# Patient Record
Sex: FEMALE | Race: WHITE | Employment: OTHER | URBAN - METROPOLITAN AREA
[De-identification: names, ages, dates, MRNs, and addresses within clinical notes are randomized per-mention and may not be internally consistent; named-entity substitution may affect disease eponyms.]

---

## 2017-02-15 RX ORDER — LEVOTHYROXINE SODIUM 0.05 MG/1
TABLET ORAL
Qty: 90 TABLET | Refills: 3 | Status: SHIPPED | OUTPATIENT
Start: 2017-02-15 | End: 2017-12-29 | Stop reason: SDUPTHER

## 2017-03-09 ENCOUNTER — OFFICE VISIT (OUTPATIENT)
Dept: INTERNAL MEDICINE CLINIC | Age: 72
End: 2017-03-09

## 2017-03-09 VITALS
OXYGEN SATURATION: 98 % | WEIGHT: 179 LBS | HEIGHT: 68 IN | BODY MASS INDEX: 27.13 KG/M2 | DIASTOLIC BLOOD PRESSURE: 74 MMHG | SYSTOLIC BLOOD PRESSURE: 118 MMHG | HEART RATE: 67 BPM

## 2017-03-09 DIAGNOSIS — F41.9 ANXIETY: ICD-10-CM

## 2017-03-09 DIAGNOSIS — E78.5 ELEVATED LIPIDS: Primary | ICD-10-CM

## 2017-03-09 DIAGNOSIS — F32.A DEPRESSION, UNSPECIFIED DEPRESSION TYPE: ICD-10-CM

## 2017-03-09 DIAGNOSIS — E03.9 HYPOTHYROIDISM, UNSPECIFIED TYPE: ICD-10-CM

## 2017-03-09 PROCEDURE — 99213 OFFICE O/P EST LOW 20 MIN: CPT | Performed by: INTERNAL MEDICINE

## 2017-03-24 RX ORDER — SIMVASTATIN 40 MG
TABLET ORAL
Qty: 90 TABLET | Refills: 3 | Status: SHIPPED | OUTPATIENT
Start: 2017-03-24 | End: 2018-02-06 | Stop reason: SDUPTHER

## 2017-09-14 ENCOUNTER — OFFICE VISIT (OUTPATIENT)
Dept: INTERNAL MEDICINE CLINIC | Age: 72
End: 2017-09-14

## 2017-09-14 VITALS
OXYGEN SATURATION: 98 % | HEIGHT: 68 IN | DIASTOLIC BLOOD PRESSURE: 70 MMHG | HEART RATE: 71 BPM | WEIGHT: 175 LBS | BODY MASS INDEX: 26.52 KG/M2 | SYSTOLIC BLOOD PRESSURE: 130 MMHG

## 2017-09-14 DIAGNOSIS — F32.A DEPRESSION, UNSPECIFIED DEPRESSION TYPE: ICD-10-CM

## 2017-09-14 DIAGNOSIS — E03.9 HYPOTHYROIDISM, UNSPECIFIED TYPE: Primary | ICD-10-CM

## 2017-09-14 DIAGNOSIS — F41.9 ANXIETY: ICD-10-CM

## 2017-09-14 DIAGNOSIS — E78.5 ELEVATED LIPIDS: ICD-10-CM

## 2017-09-14 PROCEDURE — 99213 OFFICE O/P EST LOW 20 MIN: CPT | Performed by: INTERNAL MEDICINE

## 2017-09-14 RX ORDER — SERTRALINE HYDROCHLORIDE 100 MG/1
TABLET, FILM COATED ORAL
Qty: 90 TABLET | Refills: 3 | Status: SHIPPED | OUTPATIENT
Start: 2017-09-14 | End: 2018-06-11

## 2017-09-14 ASSESSMENT — PATIENT HEALTH QUESTIONNAIRE - PHQ9
1. LITTLE INTEREST OR PLEASURE IN DOING THINGS: 0
SUM OF ALL RESPONSES TO PHQ QUESTIONS 1-9: 0
SUM OF ALL RESPONSES TO PHQ9 QUESTIONS 1 & 2: 0
2. FEELING DOWN, DEPRESSED OR HOPELESS: 0

## 2017-10-03 ENCOUNTER — TELEPHONE (OUTPATIENT)
Dept: INTERNAL MEDICINE CLINIC | Age: 72
End: 2017-10-03

## 2017-10-03 DIAGNOSIS — Z12.11 SPECIAL SCREENING FOR MALIGNANT NEOPLASMS, COLON: Primary | ICD-10-CM

## 2017-10-03 NOTE — TELEPHONE ENCOUNTER
Patient calling for order and Prior authorization for Colonoscopy. Scheduled for November 7 , 2017 , Dr. Smiley Trinidad.

## 2017-11-07 ENCOUNTER — HOSPITAL ENCOUNTER (OUTPATIENT)
Dept: SURGERY | Age: 72
Discharge: HOME | End: 2017-11-07
Attending: INTERNAL MEDICINE | Admitting: INTERNAL MEDICINE

## 2017-11-07 VITALS
HEIGHT: 68 IN | WEIGHT: 170 LBS | DIASTOLIC BLOOD PRESSURE: 79 MMHG | OXYGEN SATURATION: 97 % | BODY MASS INDEX: 25.76 KG/M2 | TEMPERATURE: 97.9 F | SYSTOLIC BLOOD PRESSURE: 106 MMHG | HEART RATE: 75 BPM | RESPIRATION RATE: 18 BRPM

## 2017-11-07 DIAGNOSIS — Z12.11 ENCOUNTER FOR SCREENING FOR MALIGNANT NEOPLASM OF COLON: ICD-10-CM

## 2017-11-07 RX ORDER — SODIUM CHLORIDE, SODIUM LACTATE, POTASSIUM CHLORIDE, CALCIUM CHLORIDE 600; 310; 30; 20 MG/100ML; MG/100ML; MG/100ML; MG/100ML
INJECTION, SOLUTION INTRAVENOUS ONCE
Status: COMPLETED | OUTPATIENT
Start: 2017-11-07 | End: 2017-11-07

## 2017-11-07 RX ORDER — LIDOCAINE HYDROCHLORIDE 10 MG/ML
0.1 INJECTION, SOLUTION EPIDURAL; INFILTRATION; INTRACAUDAL; PERINEURAL
Status: ACTIVE | OUTPATIENT
Start: 2017-11-07 | End: 2017-11-07

## 2017-11-07 RX ORDER — COVID-19 ANTIGEN TEST
KIT MISCELLANEOUS
COMMUNITY
End: 2018-08-09

## 2017-11-07 RX ADMIN — SODIUM CHLORIDE, SODIUM LACTATE, POTASSIUM CHLORIDE, CALCIUM CHLORIDE: 600; 310; 30; 20 INJECTION, SOLUTION INTRAVENOUS at 08:25

## 2017-11-07 ASSESSMENT — PAIN SCALES - GENERAL
PAINLEVEL_OUTOF10: 0

## 2017-11-07 ASSESSMENT — PAIN - FUNCTIONAL ASSESSMENT: PAIN_FUNCTIONAL_ASSESSMENT: 0-10

## 2017-11-07 NOTE — PROGRESS NOTES
Discharge instructions reviewed with patient/responsible adult and understanding verbalized. Discharge instructions signed and copies given.  Awaiting to talk to Doctor

## 2017-11-07 NOTE — BRIEF OP NOTE
Post-Sedation Assessment  Swetha Guzman   11/7/2017  A pre-sedation re-evaluation was performed immediately prior to beginning the procedure.   Procedure: CLS  Preprocedure Dx: h/o polyps  Postprocedure Dx: polyps  Medications: Procedural sedation with Fentanyl, Versed  Complications: None  Estimated Blood Loss: <10cc  Specimens: were obtained    Jose Elizabeth

## 2017-11-07 NOTE — PROCEDURES
[facility]                              COLONOSCOPY REPORT    PATIENT NAME: Aimee Potter                     :         1945  MED REC NO:   [id number]                          ROOM:  ACCOUNT NO:   [de-identified]                          ADMIT DATE:  2017  PROVIDER:     Luz Orlando MD    DATE OF PROCEDURE:  2017    REFERRING PROVIDER:  Neville Granados MD    PATIENT HISTORY:  The patient is a 77-year-old female who presents for  surveillance colonoscopy. In , three adenomatous polyps were  removed. There is no family history of colon polyps, colon cancer. MONITORING:  Oxygen saturation, blood pressure, and heart rate were  monitored continuously. MEDICATIONS OF PROCEDURE:  Include fentanyl 100 mcg and Versed 6 mg. DESCRIPTION OF PROCEDURE:  Informed consent was obtained. The  patient's past medical history, medication list and allergies were  reviewed. Risks, benefits, alternatives have been discussed. Specific risks discussed include those of bleeding, perforation,  splenic injury, aspiration pneumonia, side effects to sedative  medication, possibility of missed lesion. The patient was then placed in the left lateral decubitus position. IV sedation was started in sequential fashion until the appropriate  level of consciousness was achieved. Digital rectal examination was  unremarkable. Colonoscope was then advanced from the anus to the  cecum. The appendiceal orifice and ileocecal valve were visualized. Cecum was photographed. Colonoscope was then slowly withdrawn. Each  colonic segment was evaluated carefully. Care was taken to inspect  the proximal aspects of the IC valve, haustral folds and flexures. At  the level of hepatic flexure, diminutive polyp was removed with cold  biopsy forceps.   In the distal sigmoid, a diminutive polyp was removed  with the cold biopsy forceps. Retroflex view of the rectum was  unremarkable. Photographs obtained. Colonoscope was then withdrawn  from the patient. The procedure was terminated, well tolerated, no  immediate complications. Prep was adequate. Colonoscope withdrawal  time exceeded 6 minutes. This was demonstrated through photographs. POSTPROCEDURE DIAGNOSIS:  Two polyps. If either polyp is an adenoma, I recommend once again a 3-year  followup. If both are hyperplastic, 5 years should suffice.         Lisa Hinojosa MD    D: 11/07/2017 9:32:05       T: 11/07/2017 9:34:49     [email or web address]  Job#: [id number]     Doc#: [id number]    CC:  Cassia Kwong MD

## 2017-12-29 RX ORDER — LEVOTHYROXINE SODIUM 0.05 MG/1
TABLET ORAL
Qty: 90 TABLET | Refills: 3 | Status: SHIPPED | OUTPATIENT
Start: 2017-12-29 | End: 2018-12-03 | Stop reason: SDUPTHER

## 2017-12-29 NOTE — TELEPHONE ENCOUNTER
Refill request for levothyroxine medication.      Name of [address] [place] [address]      Last visit - 9/14/17     Pending visit - 3/16/18    Last refill -10/29/17      Medication Contract signed -   Last Oarrs ran-         Additional Comments

## 2018-02-06 RX ORDER — SIMVASTATIN 40 MG
TABLET ORAL
Qty: 90 TABLET | Refills: 3 | Status: SHIPPED | OUTPATIENT
Start: 2018-02-06 | End: 2019-03-13 | Stop reason: SDUPTHER

## 2018-03-28 ENCOUNTER — OFFICE VISIT (OUTPATIENT)
Dept: INTERNAL MEDICINE CLINIC | Age: 73
End: 2018-03-28

## 2018-03-28 VITALS
DIASTOLIC BLOOD PRESSURE: 70 MMHG | SYSTOLIC BLOOD PRESSURE: 120 MMHG | WEIGHT: 170 LBS | HEART RATE: 70 BPM | OXYGEN SATURATION: 99 % | BODY MASS INDEX: 26.23 KG/M2

## 2018-03-28 DIAGNOSIS — M79.672 PAIN OF LEFT HEEL: ICD-10-CM

## 2018-03-28 DIAGNOSIS — E03.9 HYPOTHYROIDISM, UNSPECIFIED TYPE: ICD-10-CM

## 2018-03-28 DIAGNOSIS — E78.5 ELEVATED LIPIDS: Primary | ICD-10-CM

## 2018-03-28 DIAGNOSIS — F41.9 ANXIETY: ICD-10-CM

## 2018-03-28 DIAGNOSIS — F32.A DEPRESSION, UNSPECIFIED DEPRESSION TYPE: ICD-10-CM

## 2018-03-28 PROCEDURE — G8400 PT W/DXA NO RESULTS DOC: HCPCS | Performed by: INTERNAL MEDICINE

## 2018-03-28 PROCEDURE — 1123F ACP DISCUSS/DSCN MKR DOCD: CPT | Performed by: INTERNAL MEDICINE

## 2018-03-28 PROCEDURE — 99213 OFFICE O/P EST LOW 20 MIN: CPT | Performed by: INTERNAL MEDICINE

## 2018-03-28 PROCEDURE — G8419 CALC BMI OUT NRM PARAM NOF/U: HCPCS | Performed by: INTERNAL MEDICINE

## 2018-03-28 PROCEDURE — 3017F COLORECTAL CA SCREEN DOC REV: CPT | Performed by: INTERNAL MEDICINE

## 2018-03-28 PROCEDURE — 3014F SCREEN MAMMO DOC REV: CPT | Performed by: INTERNAL MEDICINE

## 2018-03-28 PROCEDURE — G8427 DOCREV CUR MEDS BY ELIG CLIN: HCPCS | Performed by: INTERNAL MEDICINE

## 2018-03-28 PROCEDURE — 1090F PRES/ABSN URINE INCON ASSESS: CPT | Performed by: INTERNAL MEDICINE

## 2018-03-28 PROCEDURE — 1036F TOBACCO NON-USER: CPT | Performed by: INTERNAL MEDICINE

## 2018-03-28 PROCEDURE — 4040F PNEUMOC VAC/ADMIN/RCVD: CPT | Performed by: INTERNAL MEDICINE

## 2018-03-28 PROCEDURE — G8484 FLU IMMUNIZE NO ADMIN: HCPCS | Performed by: INTERNAL MEDICINE

## 2018-03-28 NOTE — PROGRESS NOTES
Subjective:      Patient ID: General Franklin is a 81 y.o. female. Cc: Lipids  HPI: Patient with hyperlipidemia, hypothyroid, anxiety, depression. Patient last seen in the office 9/14/2017 and states that a week later her mother passed away who was in hospice and living at her house. Patient states she stopped Zoloft as she frequently skips it and cannot tell a difference if she really needed it or not. At this time states she is doing okay concerning anxiety/depression. 11/7/2017: Colonoscopy: DX: 2 hyperplastic polyps. Dr. Za Saini. Redo 5 years. Patient complains of left heel pain. Frequently goes barefoot. Denies definite trauma history. Medicines and allergies reviewed  Health maintenance reviewed  Social history reviewed  Family history reviewed  Reviewed laboratory data 3/20/2018: Chemistry panel: Unremarkable. Lipid panel: Total cholesterol: 181. LDL cholesterol: 82.    Review of Systems    Review of Systems   Constitutional: negative   HENT: negative   EYES: negative   Respiratory: negative   Gastrointestinal: negative   Endocrine: negative   Musculoskeletal:  Left heel pain. Skin: negative   Allergic/Immunological: negative   Hematological: negative   Psychiatric/Behavorial: negative   CV: negative   CNS: negative   :Negative   S/E:Negative  Renal: Negative      Objective:   Physical Exam : Lungs: Clear to auscultation. CV: S1-S2 normal.  SANDOVAL. Carotid: No bruit. Carotid upstroke. Head/neck: Neck: No lymphadenopathy. Thyroid: Nonpalpable and nontender to touch. Spine/extremities: Ankle edema bilaterally. Skin: No rash. Blood pressure 120/70, pulse 70, weight 170 lb (77.1 kg), SpO2 99 %. Assessment:       1. Lipids: Stable     2. Left heel pain  3. Hypothyroid  4. Anxiety: Stable off Zoloft  5. Depression: Stable off Zoloft      Plan:      1. Referral to podiatry for left heel pain  2. Continue present medicines  3.   Gave slip to obtain fasting laboratory studies before next office visit  Return follow-up  Dr. Rober London

## 2018-04-30 ENCOUNTER — OFFICE VISIT (OUTPATIENT)
Dept: INTERNAL MEDICINE CLINIC | Age: 73
End: 2018-04-30

## 2018-04-30 VITALS
WEIGHT: 169 LBS | BODY MASS INDEX: 26.08 KG/M2 | DIASTOLIC BLOOD PRESSURE: 78 MMHG | OXYGEN SATURATION: 98 % | HEART RATE: 81 BPM | SYSTOLIC BLOOD PRESSURE: 138 MMHG

## 2018-04-30 DIAGNOSIS — F41.9 ANXIETY: ICD-10-CM

## 2018-04-30 DIAGNOSIS — E78.5 ELEVATED LIPIDS: ICD-10-CM

## 2018-04-30 DIAGNOSIS — F32.A DEPRESSION, UNSPECIFIED DEPRESSION TYPE: ICD-10-CM

## 2018-04-30 DIAGNOSIS — E03.9 HYPOTHYROIDISM, UNSPECIFIED TYPE: ICD-10-CM

## 2018-04-30 DIAGNOSIS — G51.0 BELL'S PALSY: Primary | ICD-10-CM

## 2018-04-30 PROCEDURE — 1036F TOBACCO NON-USER: CPT | Performed by: INTERNAL MEDICINE

## 2018-04-30 PROCEDURE — 99214 OFFICE O/P EST MOD 30 MIN: CPT | Performed by: INTERNAL MEDICINE

## 2018-04-30 PROCEDURE — 1090F PRES/ABSN URINE INCON ASSESS: CPT | Performed by: INTERNAL MEDICINE

## 2018-04-30 PROCEDURE — G8419 CALC BMI OUT NRM PARAM NOF/U: HCPCS | Performed by: INTERNAL MEDICINE

## 2018-04-30 PROCEDURE — G8400 PT W/DXA NO RESULTS DOC: HCPCS | Performed by: INTERNAL MEDICINE

## 2018-04-30 PROCEDURE — 3017F COLORECTAL CA SCREEN DOC REV: CPT | Performed by: INTERNAL MEDICINE

## 2018-04-30 PROCEDURE — 4040F PNEUMOC VAC/ADMIN/RCVD: CPT | Performed by: INTERNAL MEDICINE

## 2018-04-30 PROCEDURE — 1123F ACP DISCUSS/DSCN MKR DOCD: CPT | Performed by: INTERNAL MEDICINE

## 2018-04-30 PROCEDURE — G8427 DOCREV CUR MEDS BY ELIG CLIN: HCPCS | Performed by: INTERNAL MEDICINE

## 2018-04-30 RX ORDER — ACYCLOVIR 400 MG/1
400 TABLET ORAL
Qty: 50 TABLET | Refills: 0 | Status: SHIPPED | OUTPATIENT
Start: 2018-04-30 | End: 2018-05-10

## 2018-04-30 RX ORDER — PREDNISONE 20 MG/1
TABLET ORAL
Qty: 28 TABLET | Refills: 0 | Status: SHIPPED | OUTPATIENT
Start: 2018-04-30 | End: 2018-06-11 | Stop reason: ALTCHOICE

## 2018-05-08 ENCOUNTER — TELEPHONE (OUTPATIENT)
Dept: INTERNAL MEDICINE CLINIC | Age: 73
End: 2018-05-08

## 2018-05-14 ENCOUNTER — TELEPHONE (OUTPATIENT)
Dept: INTERNAL MEDICINE CLINIC | Age: 73
End: 2018-05-14

## 2018-06-11 ENCOUNTER — OFFICE VISIT (OUTPATIENT)
Dept: INTERNAL MEDICINE CLINIC | Age: 73
End: 2018-06-11

## 2018-06-11 VITALS
BODY MASS INDEX: 25.62 KG/M2 | SYSTOLIC BLOOD PRESSURE: 110 MMHG | WEIGHT: 166 LBS | HEART RATE: 69 BPM | OXYGEN SATURATION: 98 % | DIASTOLIC BLOOD PRESSURE: 66 MMHG

## 2018-06-11 DIAGNOSIS — G51.0 BELL'S PALSY: ICD-10-CM

## 2018-06-11 PROCEDURE — G8400 PT W/DXA NO RESULTS DOC: HCPCS | Performed by: INTERNAL MEDICINE

## 2018-06-11 PROCEDURE — 99214 OFFICE O/P EST MOD 30 MIN: CPT | Performed by: INTERNAL MEDICINE

## 2018-06-11 PROCEDURE — 1090F PRES/ABSN URINE INCON ASSESS: CPT | Performed by: INTERNAL MEDICINE

## 2018-06-11 PROCEDURE — 1036F TOBACCO NON-USER: CPT | Performed by: INTERNAL MEDICINE

## 2018-06-11 PROCEDURE — 1123F ACP DISCUSS/DSCN MKR DOCD: CPT | Performed by: INTERNAL MEDICINE

## 2018-06-11 PROCEDURE — 3017F COLORECTAL CA SCREEN DOC REV: CPT | Performed by: INTERNAL MEDICINE

## 2018-06-11 PROCEDURE — G8419 CALC BMI OUT NRM PARAM NOF/U: HCPCS | Performed by: INTERNAL MEDICINE

## 2018-06-11 PROCEDURE — G8427 DOCREV CUR MEDS BY ELIG CLIN: HCPCS | Performed by: INTERNAL MEDICINE

## 2018-06-11 PROCEDURE — 4040F PNEUMOC VAC/ADMIN/RCVD: CPT | Performed by: INTERNAL MEDICINE

## 2018-07-26 ENCOUNTER — OFFICE VISIT (OUTPATIENT)
Dept: INTERNAL MEDICINE CLINIC | Age: 73
End: 2018-07-26

## 2018-07-26 VITALS
HEART RATE: 68 BPM | WEIGHT: 161 LBS | BODY MASS INDEX: 24.84 KG/M2 | SYSTOLIC BLOOD PRESSURE: 124 MMHG | OXYGEN SATURATION: 97 % | DIASTOLIC BLOOD PRESSURE: 68 MMHG

## 2018-07-26 DIAGNOSIS — G51.0 BELL'S PALSY: Primary | ICD-10-CM

## 2018-07-26 DIAGNOSIS — E78.5 ELEVATED LIPIDS: ICD-10-CM

## 2018-07-26 DIAGNOSIS — K58.9 IRRITABLE BOWEL SYNDROME, UNSPECIFIED TYPE: ICD-10-CM

## 2018-07-26 PROCEDURE — G8427 DOCREV CUR MEDS BY ELIG CLIN: HCPCS | Performed by: INTERNAL MEDICINE

## 2018-07-26 PROCEDURE — 99214 OFFICE O/P EST MOD 30 MIN: CPT | Performed by: INTERNAL MEDICINE

## 2018-07-26 PROCEDURE — G8400 PT W/DXA NO RESULTS DOC: HCPCS | Performed by: INTERNAL MEDICINE

## 2018-07-26 PROCEDURE — G8420 CALC BMI NORM PARAMETERS: HCPCS | Performed by: INTERNAL MEDICINE

## 2018-07-26 PROCEDURE — 1036F TOBACCO NON-USER: CPT | Performed by: INTERNAL MEDICINE

## 2018-07-26 PROCEDURE — 4040F PNEUMOC VAC/ADMIN/RCVD: CPT | Performed by: INTERNAL MEDICINE

## 2018-07-26 PROCEDURE — 3017F COLORECTAL CA SCREEN DOC REV: CPT | Performed by: INTERNAL MEDICINE

## 2018-07-26 PROCEDURE — 1123F ACP DISCUSS/DSCN MKR DOCD: CPT | Performed by: INTERNAL MEDICINE

## 2018-07-26 PROCEDURE — 1090F PRES/ABSN URINE INCON ASSESS: CPT | Performed by: INTERNAL MEDICINE

## 2018-07-26 PROCEDURE — 1101F PT FALLS ASSESS-DOCD LE1/YR: CPT | Performed by: INTERNAL MEDICINE

## 2018-07-26 NOTE — PROGRESS NOTES
orientation. Good historian. Blood pressure 124/68, pulse 68, weight 161 lb (73 kg), SpO2 97 %. Assessment:      1. Bell's palsy improvement has plateaued at about 95%. 2.  Right eye irritation and occasional blurriness  3. Hypothyroid  4. Anxiety: Stable  5. Depression: Stable         Plan:      1. Continue to use Lacri-Lube ointment at nighttime but needs to increase eye wetting solution during the day  2. Referral to ophthalmology  3. Referral to neurology  4. Keep upcoming appointment in October  5.   Obtain fasting laboratory studies before next office visit  Return follow-up  Dr. Melissa Maria

## 2018-08-09 ENCOUNTER — INITIAL CONSULT (OUTPATIENT)
Dept: NEUROLOGY | Age: 73
End: 2018-08-09

## 2018-08-09 ENCOUNTER — HOSPITAL ENCOUNTER (OUTPATIENT)
Age: 73
Discharge: HOME | End: 2018-08-09
Payer: MEDICARE

## 2018-08-09 VITALS
SYSTOLIC BLOOD PRESSURE: 113 MMHG | BODY MASS INDEX: 25.58 KG/M2 | HEIGHT: 67 IN | OXYGEN SATURATION: 97 % | DIASTOLIC BLOOD PRESSURE: 63 MMHG | HEART RATE: 66 BPM | WEIGHT: 163 LBS

## 2018-08-09 DIAGNOSIS — E78.5 DYSLIPIDEMIA: ICD-10-CM

## 2018-08-09 DIAGNOSIS — R29.810 FACIAL WEAKNESS: ICD-10-CM

## 2018-08-09 DIAGNOSIS — G51.0 BELL'S PALSY: Primary | ICD-10-CM

## 2018-08-09 DIAGNOSIS — E03.9 ACQUIRED HYPOTHYROIDISM: ICD-10-CM

## 2018-08-09 LAB
FOLATE: 14.97 NG/ML (ref 4.78–24.2)
VITAMIN B-12: 443 PG/ML (ref 211–911)

## 2018-08-09 PROCEDURE — 82607 VITAMIN B-12: CPT

## 2018-08-09 PROCEDURE — 3017F COLORECTAL CA SCREEN DOC REV: CPT | Performed by: PSYCHIATRY & NEUROLOGY

## 2018-08-09 PROCEDURE — 1101F PT FALLS ASSESS-DOCD LE1/YR: CPT | Performed by: PSYCHIATRY & NEUROLOGY

## 2018-08-09 PROCEDURE — 99203 OFFICE O/P NEW LOW 30 MIN: CPT | Performed by: PSYCHIATRY & NEUROLOGY

## 2018-08-09 PROCEDURE — G8427 DOCREV CUR MEDS BY ELIG CLIN: HCPCS | Performed by: PSYCHIATRY & NEUROLOGY

## 2018-08-09 PROCEDURE — G8400 PT W/DXA NO RESULTS DOC: HCPCS | Performed by: PSYCHIATRY & NEUROLOGY

## 2018-08-09 PROCEDURE — 1123F ACP DISCUSS/DSCN MKR DOCD: CPT | Performed by: PSYCHIATRY & NEUROLOGY

## 2018-08-09 PROCEDURE — G8419 CALC BMI OUT NRM PARAM NOF/U: HCPCS | Performed by: PSYCHIATRY & NEUROLOGY

## 2018-08-09 PROCEDURE — 1090F PRES/ABSN URINE INCON ASSESS: CPT | Performed by: PSYCHIATRY & NEUROLOGY

## 2018-08-09 PROCEDURE — 4040F PNEUMOC VAC/ADMIN/RCVD: CPT | Performed by: PSYCHIATRY & NEUROLOGY

## 2018-08-09 PROCEDURE — 82746 ASSAY OF FOLIC ACID SERUM: CPT

## 2018-08-09 PROCEDURE — 36415 COLL VENOUS BLD VENIPUNCTURE: CPT

## 2018-08-09 PROCEDURE — 1036F TOBACCO NON-USER: CPT | Performed by: PSYCHIATRY & NEUROLOGY

## 2018-08-09 NOTE — PROGRESS NOTES
The patient is a 81y.o. years old female who  was referred by Supriya Andrade MD  for consultation regarding bells palsy     HPI:  The patient was diagnosed with right Bell's palsy at the end of April. She woke up that day with acute right facial weakness. She was treated with acyclovir and medrol dose pack. Sx improved gradually. She came today for consultation regarding worsening of her vision. She is describing intermittent blurred vision of her right eye for the last for 5 weeks. Symptoms are waxing and waning but daily. Degree is moderate. No double vision, headache, numbness or tingling or weakness. She has occasional asymmetry in her facial smile with difficulty drinking and drooling. She is using eye patch and artificial tears daily. She thinks she has improved at least 75-80%. The patient was concerned about possible central cause for her Bell's palsy. She denies recent rash, tick bite or travel. She denies any weakness in arms or legs or chest pain. No other triggers or other associated symptoms. History of hypothyroid on Synthroid. She is on Zocor for hyperlipidemia. She denies smoking or history of diabetes. No other new symptoms today. Other review of system was unremarkable. Past Medical History:   Diagnosis Date    Anemia     Anxiety     Asthma '96    PFT's    Back strain     Bell's palsy 04/2018    Rt Side    Chest pain 08/05/2016    GXT: Normal.    Chronic back pain     Cystitis     Depression     Dermatitis     Fibroids     GERD (gastroesophageal reflux disease)     Hematuria '00    Cystoscopy & IVP  (---)    History of recurrent UTIs     Hyperlipidemia     Hypothyroidism 06/05    Insomnia     Irritable bowel syndrome     Palpitations     Pneumonia 11/93     Prior to Visit Medications    Medication Sig Taking?  Authorizing Provider   simvastatin (ZOCOR) 40 MG tablet TAKE ONE TABLET EVERY EVENING FOR HIGH CHOLESTEROL Yes Margaret Hutchinson, CASI - CNP levothyroxine (SYNTHROID) 50 MCG tablet TAKE 1 TABLET EVERY DAY (THYROID MEDICINE) Yes Nolvia Linda MD     Allergies   Allergen Reactions    Macrobid [Nitrofurantoin Monohyd Macro] Rash    Sulfa Antibiotics Nausea Only and Other (See Comments)     Stomach nausea     Social History   Substance Use Topics    Smoking status: Former Smoker     Quit date: 1/1/1972    Smokeless tobacco: Never Used    Alcohol use Yes      Comment: 2 a month     Family History   Problem Relation Age of Onset    Heart Disease Mother     High Blood Pressure Mother     High Cholesterol Mother     Substance Abuse Mother         Tob. use    Cancer Mother         basal cell    Emphysema Mother     Heart Failure Mother     Hypertension Mother     Diabetes Father     Alcohol Abuse Father     Arrhythmia Father     Hypertension Father     Cancer Father         bladder    Asthma Sister     Cancer Maternal Aunt     Heart Failure Maternal Grandmother     Emphysema Maternal Grandfather      Past Surgical History:   Procedure Laterality Date    CHOLECYSTECTOMY      COLONOSCOPY  01/04    Neg.  COLONOSCOPY  10/1/14    Tubular Adenoma    COLONOSCOPY  11/07/2017     Hyperplastic Polyps ×2: Redo 5 years    CYSTOSCOPY      HYSTERECTOMY, TOTAL ABDOMINAL      TONSILLECTOMY AND ADENOIDECTOMY      TUBAL LIGATION         ROS : A 10-12 system review of constitutional, cardiovascular, respiratory, musculoskeletal, endocrine, skin, SHEENT, genitourinary, psychiatric and neurologic systems was obtained and updated today and is unremarkable except as mentioned in my HPI        Exam:   Constitutional:   Vitals:    08/09/18 1049   BP: 113/63   Pulse: 66   SpO2: 97%   Weight: 163 lb (73.9 kg)   Height: 5' 7\" (1.702 m)       General appearance: well-nourished. Eye: No icterus. No blurring of optic disc. Neck: supple  Cardiovascular: No carotid bruit. No lower leg edema with good pulsation.    Mental Status: Oriented to person, place, problem, and time. Fluent speech. Good fund of knowledge. Normal attention span and concentration. Cranial Nerves:   II: Visual fields: Full to confrontation  III: Pupils: equal, round, reactive to light  III,IV,VI: Extra Ocular Movements are intact. No nystagmus  V: Facial sensation is intact to pin prick and light touch  VII: Facial strength and movements: Asymmetric smile with right facial asymmetry, and frequent eye blinking on the right side with compensation her left eye, difficulties with buccinator and eye closure on the right compared to the left. VIII: Hearing: Intact to finger rub bilaterally  IX: Palate elevation is symmetric  XI: Shoulder shrug is intact  XII: Tongue movements are normal  Musculoskeletal: 5/5 in all 4 extremities. Normal tone. Reflexes: Bilateral biceps 2/4, triceps 2/4, brachial radialis 2/4, knee 2/4 and ankle 2/4. Planters: flexor bilaterally. Coordination: no pronator drift, no dysmetria. Finger nose finger testing within normal limits. Sensation: normal to all modalities. Gait/Posture: steady      Medical decision making:  I personally reviewed and updated social history, past medical history, medications, allergy, surgical history, and family history as documented in the patient's electronic health records. Labs and/or neuroimaging and other test results reviewed and discussed with the patient. Reviewed notes from other physicians. Provided patient education regarding risk, benefits and treatment options as well as adherence to medication regimen and side effect from these medications. Diagnosis Orders   1. Bell's palsy  MRI Brain WO Contrast    Vitamin B12 & Folate   2. Facial weakness  MRI Brain WO Contrast   3. Dyslipidemia     4. Acquired hypothyroidism         Assessment:  Right Bell's palsy. Long discussion with the patient regading outcome from Bell's palsy, differential diagnosis and possible treatment options.   So far her exam is consistent with

## 2018-08-09 NOTE — LETTER
Drake Krause MD    [facility] Neurology  [address] [facility], [address] [place] [address]. [address] [place] [address]    [phone] (Phone)  [phone] (Fax)    Dear Dr Cash Kenny MD    I had the pleasure of seeing your patient Ceci Fitzgerald  1945 today. I have attached a detailed summary below:    The patient is a 81y.o. years old female who  was referred by Cash Kenny MD  for consultation regarding bells palsy     HPI:  The patient was diagnosed with right Bell's palsy at the end of April. She woke up that day with acute right facial weakness. She was treated with acyclovir and medrol dose pack. Sx improved gradually. She came today for consultation regarding worsening of her vision. She is describing intermittent blurred vision of her right eye for the last for 5 weeks. Symptoms are waxing and waning but daily. Degree is moderate. No double vision, headache, numbness or tingling or weakness. She has occasional asymmetry in her facial smile with difficulty drinking and drooling. She is using eye patch and artificial tears daily. She thinks she has improved at least 75-80%. The patient was concerned about possible central cause for her Bell's palsy. She denies recent rash, tick bite or travel. She denies any weakness in arms or legs or chest pain. No other triggers or other associated symptoms. History of hypothyroid on Synthroid. She is on Zocor for hyperlipidemia. She denies smoking or history of diabetes. No other new symptoms today. Other review of system was unremarkable.       Past Medical History:   Diagnosis Date    Anemia     Anxiety     Asthma '    PFT's    Back strain     Bell's palsy 2018    Rt Side    Chest pain 2016    GXT: Normal.    Chronic back pain     Cystitis     Depression     Dermatitis     Fibroids     GERD (gastroesophageal reflux disease)     Hematuria '00    Cystoscopy & IVP  (---)  History of recurrent UTIs     Hyperlipidemia     Hypothyroidism 06/05    Insomnia     Irritable bowel syndrome     Palpitations     Pneumonia 11/93     Prior to Visit Medications    Medication Sig Taking? Authorizing Provider   simvastatin (ZOCOR) 40 MG tablet TAKE ONE TABLET EVERY EVENING FOR HIGH CHOLESTEROL Yes CASI Perdue CNP   levothyroxine (SYNTHROID) 50 MCG tablet TAKE 1 TABLET EVERY DAY (THYROID MEDICINE) Yes George Linda MD     Allergies   Allergen Reactions    Macrobid [Nitrofurantoin Monohyd Macro] Rash    Sulfa Antibiotics Nausea Only and Other (See Comments)     Stomach nausea     Social History   Substance Use Topics    Smoking status: Former Smoker     Quit date: 1/1/1972    Smokeless tobacco: Never Used    Alcohol use Yes      Comment: 2 a month     Family History   Problem Relation Age of Onset    Heart Disease Mother     High Blood Pressure Mother     High Cholesterol Mother     Substance Abuse Mother         Tob. use    Cancer Mother         basal cell    Emphysema Mother     Heart Failure Mother     Hypertension Mother     Diabetes Father     Alcohol Abuse Father     Arrhythmia Father     Hypertension Father     Cancer Father         bladder    Asthma Sister     Cancer Maternal Aunt     Heart Failure Maternal Grandmother     Emphysema Maternal Grandfather      Past Surgical History:   Procedure Laterality Date    CHOLECYSTECTOMY      COLONOSCOPY  01/04    Neg.     COLONOSCOPY  10/1/14    Tubular Adenoma    COLONOSCOPY  11/07/2017     Hyperplastic Polyps ×2: Redo 5 years    CYSTOSCOPY      HYSTERECTOMY, TOTAL ABDOMINAL      TONSILLECTOMY AND ADENOIDECTOMY      TUBAL LIGATION         ROS : A 10-12 system review of constitutional, cardiovascular, respiratory, musculoskeletal, endocrine, skin, SHEENT, genitourinary, psychiatric and neurologic systems was obtained and updated today and is unremarkable except as mentioned in my HPI        Exam:

## 2018-08-13 ENCOUNTER — HOSPITAL ENCOUNTER (OUTPATIENT)
Age: 73
Discharge: HOME | End: 2018-08-13
Payer: MEDICARE

## 2018-08-13 ENCOUNTER — OFFICE VISIT (OUTPATIENT)
Dept: INTERNAL MEDICINE CLINIC | Age: 73
End: 2018-08-13

## 2018-08-13 ENCOUNTER — HOSPITAL ENCOUNTER (OUTPATIENT)
Dept: GENERAL RADIOLOGY | Age: 73
Discharge: HOME | End: 2018-08-13
Payer: MEDICARE

## 2018-08-13 VITALS
HEART RATE: 67 BPM | DIASTOLIC BLOOD PRESSURE: 68 MMHG | WEIGHT: 161 LBS | SYSTOLIC BLOOD PRESSURE: 124 MMHG | OXYGEN SATURATION: 98 % | BODY MASS INDEX: 25.22 KG/M2

## 2018-08-13 DIAGNOSIS — Z87.01 HISTORY OF PNEUMONIA: ICD-10-CM

## 2018-08-13 DIAGNOSIS — R06.2 WHEEZING: ICD-10-CM

## 2018-08-13 DIAGNOSIS — R05.9 COUGH: ICD-10-CM

## 2018-08-13 DIAGNOSIS — J20.9 ACUTE BRONCHITIS, UNSPECIFIED ORGANISM: Primary | ICD-10-CM

## 2018-08-13 PROCEDURE — 94640 AIRWAY INHALATION TREATMENT: CPT | Performed by: NURSE PRACTITIONER

## 2018-08-13 PROCEDURE — 1123F ACP DISCUSS/DSCN MKR DOCD: CPT | Performed by: NURSE PRACTITIONER

## 2018-08-13 PROCEDURE — 1101F PT FALLS ASSESS-DOCD LE1/YR: CPT | Performed by: NURSE PRACTITIONER

## 2018-08-13 PROCEDURE — 3017F COLORECTAL CA SCREEN DOC REV: CPT | Performed by: NURSE PRACTITIONER

## 2018-08-13 PROCEDURE — G8427 DOCREV CUR MEDS BY ELIG CLIN: HCPCS | Performed by: NURSE PRACTITIONER

## 2018-08-13 PROCEDURE — 4040F PNEUMOC VAC/ADMIN/RCVD: CPT | Performed by: NURSE PRACTITIONER

## 2018-08-13 PROCEDURE — 1036F TOBACCO NON-USER: CPT | Performed by: NURSE PRACTITIONER

## 2018-08-13 PROCEDURE — 71046 X-RAY EXAM CHEST 2 VIEWS: CPT

## 2018-08-13 PROCEDURE — 99214 OFFICE O/P EST MOD 30 MIN: CPT | Performed by: NURSE PRACTITIONER

## 2018-08-13 PROCEDURE — G8400 PT W/DXA NO RESULTS DOC: HCPCS | Performed by: NURSE PRACTITIONER

## 2018-08-13 PROCEDURE — 1090F PRES/ABSN URINE INCON ASSESS: CPT | Performed by: NURSE PRACTITIONER

## 2018-08-13 PROCEDURE — G8419 CALC BMI OUT NRM PARAM NOF/U: HCPCS | Performed by: NURSE PRACTITIONER

## 2018-08-13 RX ORDER — GUAIFENESIN AND CODEINE PHOSPHATE 100; 10 MG/5ML; MG/5ML
5 SOLUTION ORAL EVERY 4 HOURS PRN
Qty: 118 ML | Refills: 0 | Status: SHIPPED | OUTPATIENT
Start: 2018-08-13 | End: 2018-08-20

## 2018-08-13 RX ORDER — PREDNISONE 20 MG/1
40 TABLET ORAL DAILY
Qty: 10 TABLET | Refills: 0 | Status: SHIPPED | OUTPATIENT
Start: 2018-08-13 | End: 2018-08-18

## 2018-08-13 RX ORDER — ALBUTEROL SULFATE 2.5 MG/3ML
2.5 SOLUTION RESPIRATORY (INHALATION) ONCE
Status: COMPLETED | OUTPATIENT
Start: 2018-08-13 | End: 2018-08-13

## 2018-08-13 RX ORDER — ALBUTEROL SULFATE 90 UG/1
2 AEROSOL, METERED RESPIRATORY (INHALATION) EVERY 6 HOURS PRN
Qty: 1 INHALER | Refills: 1 | Status: SHIPPED | OUTPATIENT
Start: 2018-08-13 | End: 2019-04-12 | Stop reason: ALTCHOICE

## 2018-08-13 RX ORDER — AZITHROMYCIN 250 MG/1
250 TABLET, FILM COATED ORAL DAILY
Qty: 1 PACKET | Refills: 0 | Status: SHIPPED | OUTPATIENT
Start: 2018-08-13 | End: 2018-08-18

## 2018-08-13 RX ADMIN — ALBUTEROL SULFATE 2.5 MG: 2.5 SOLUTION RESPIRATORY (INHALATION) at 10:44

## 2018-08-13 ASSESSMENT — ENCOUNTER SYMPTOMS
WHEEZING: 0
CHEST TIGHTNESS: 0
DIARRHEA: 0
COUGH: 1
EYES NEGATIVE: 1
VOMITING: 0
NAUSEA: 0
SINUS PAIN: 0
SHORTNESS OF BREATH: 0

## 2018-08-13 NOTE — PATIENT INSTRUCTIONS
Notify office if you do not improve or have worsening of condition. Take medication as prescribed. Take antibiotics medication until all gone. Drink plenty of fluids and rest.  Practice good hand hygiene. May sleep with humidifier as needed. Gargle with warm salt water 3-4 times a day to help with sore throat. You can use ice, warm chicken noodle soup, soft foods, popsicles and cough drops to help soothe your throat. May take Tylenol/Ibuprofen (alternate) as needed for fever/pain. Do not eat or drink after anyone. Do  not share utensils. After day 3 change out toothbrush to a new one. Cover your mouth and nose when you cough or sneeze. Patient Education        Bronchitis: Care Instructions  Your Care Instructions    Bronchitis is inflammation of the bronchial tubes, which carry air to the lungs. The tubes swell and produce mucus, or phlegm. The mucus and inflamed bronchial tubes make you cough. You may have trouble breathing. Most cases of bronchitis are caused by viruses like those that cause colds. Antibiotics usually do not help and they may be harmful. Bronchitis usually develops rapidly and lasts about 2 to 3 weeks in otherwise healthy people. Follow-up care is a key part of your treatment and safety. Be sure to make and go to all appointments, and call your doctor if you are having problems. It's also a good idea to know your test results and keep a list of the medicines you take. How can you care for yourself at home? · Take all medicines exactly as prescribed. Call your doctor if you think you are having a problem with your medicine. · Get some extra rest.  · Take an over-the-counter pain medicine, such as acetaminophen (Tylenol), ibuprofen (Advil, Motrin), or naproxen (Aleve) to reduce fever and relieve body aches. Read and follow all instructions on the label. · Do not take two or more pain medicines at the same time unless the doctor told you to.  Many pain medicines have acetaminophen, which is Tylenol. Too much acetaminophen (Tylenol) can be harmful. · Take an over-the-counter cough medicine that contains dextromethorphan to help quiet a dry, hacking cough so that you can sleep. Avoid cough medicines that have more than one active ingredient. Read and follow all instructions on the label. · Breathe moist air from a humidifier, hot shower, or sink filled with hot water. The heat and moisture will thin mucus so you can cough it out. · Do not smoke. Smoking can make bronchitis worse. If you need help quitting, talk to your doctor about stop-smoking programs and medicines. These can increase your chances of quitting for good. When should you call for help? Call 911 anytime you think you may need emergency care. For example, call if:    · You have severe trouble breathing.    Call your doctor now or seek immediate medical care if:    · You have new or worse trouble breathing.     · You cough up dark brown or bloody mucus (sputum).     · You have a new or higher fever.     · You have a new rash.    Watch closely for changes in your health, and be sure to contact your doctor if:    · You cough more deeply or more often, especially if you notice more mucus or a change in the color of your mucus.     · You are not getting better as expected. Where can you learn more? Go to [email or web address]. org and sign in to your [employer] account. Enter H333 in the [facility] box to learn more about \"Bronchitis: Care Instructions. \"     If you do not have an account, please click on the \"Sign Up Now\" link. Current as of: December 6, 2017  Content Version: 11.7  © 4288-9530 [employer]. Care instructions adapted under license by [facility] ([facility]). If you have questions about a medical condition or this instruction, always ask your healthcare professional. [address] any warranty or liability for your use of this information.

## 2018-08-13 NOTE — PROGRESS NOTES
equal, round, and reactive to light. Conjunctivae are normal. No scleral icterus. Neck: Normal range of motion. Neck supple. Cardiovascular: Normal rate, regular rhythm and normal heart sounds. Pulmonary/Chest: Effort normal. No accessory muscle usage. No respiratory distress. She has wheezes (throughout). She has rhonchi (bilateral bases). She has no rales. Positive for productive cough greenish in color. Abdominal: Soft. Normal appearance and bowel sounds are normal. There is no hepatosplenomegaly. There is no CVA tenderness. Musculoskeletal: Normal range of motion. Neurological: She is alert and oriented to person, place, and time. Skin: Skin is warm, dry and intact. Psychiatric: She has a normal mood and affect. Her speech is normal and behavior is normal. Judgment and thought content normal.   Vitals reviewed. ASSESSMENT/PLAN:    1. Acute bronchitis, unspecified organism  Notify office if you do not improve or have worsening of condition. Take medication as prescribed. Take antibiotics medication until all gone. Drink plenty of fluids and rest.  Practice good hand hygiene. May sleep with humidifier as needed. Gargle with warm salt water 3-4 times a day to help with sore throat. You can use ice, warm chicken noodle soup, soft foods, popsicles and cough drops to help soothe your throat. May take Tylenol/Ibuprofen (alternate) as needed for fever/pain. Do not eat or drink after anyone. Do  not share utensils. After day 3 change out toothbrush to a new one. Cover your mouth and nose when you cough or sneeze. - azithromycin (ZITHROMAX) 250 MG tablet; Take 1 tablet by mouth daily for 5 days Take 2 tablets on day one and one tablet daily x 4 days. Dispense: 1 packet; Refill: 0  - predniSONE (DELTASONE) 20 MG tablet; Take 2 tablets by mouth daily for 5 days  Dispense: 10 tablet; Refill: 0    2.  History of pneumonia  Notify office if you have no improvement or worsening of condition. If you become short of breath, have chest pain, increased fever, etc.. Report to emergency room. See above plan. - XR CHEST STANDARD (2 VW); Future-STAT    3. Wheezing  See above plan. Take inhaler as ordered. In office nebulizer administered, patient tolerated well. O2 sat 98%. Lungs demonstrated decreased wheezing throughout. - albuterol (PROVENTIL) nebulizer solution 2.5 mg; Take 3 mLs by nebulization once - in office-    - XR CHEST STANDARD (2 VW); Future  - albuterol sulfate HFA (VENTOLIN HFA) 108 (90 Base) MCG/ACT inhaler; Inhale 2 puffs into the lungs every 6 hours as needed for Wheezing  Dispense: 1 Inhaler; Refill: 1  - predniSONE (DELTASONE) 20 MG tablet; Take 2 tablets by mouth daily for 5 days  Dispense: 10 tablet; Refill: 0    4. Cough  Take medication as prescribed. May use cough drops. - guaiFENesin-codeine (CHERATUSSIN AC) 100-10 MG/5ML syrup; Take 5 mLs by mouth every 4 hours as needed for Cough for up to 7 days. .  Dispense: 118 mL; Refill: 0      Return if symptoms worsen or fail to improve.

## 2018-08-15 ENCOUNTER — HOSPITAL ENCOUNTER (OUTPATIENT)
Dept: MRI IMAGING | Age: 73
Discharge: HOME | End: 2018-08-15
Payer: MEDICARE

## 2018-08-15 DIAGNOSIS — G51.0 BELL'S PALSY: ICD-10-CM

## 2018-08-15 DIAGNOSIS — R29.810 FACIAL WEAKNESS: ICD-10-CM

## 2018-08-15 PROCEDURE — 70551 MRI BRAIN STEM W/O DYE: CPT

## 2018-10-04 ENCOUNTER — OFFICE VISIT (OUTPATIENT)
Dept: INTERNAL MEDICINE CLINIC | Age: 73
End: 2018-10-04
Payer: MEDICARE

## 2018-10-04 VITALS
HEART RATE: 73 BPM | OXYGEN SATURATION: 98 % | SYSTOLIC BLOOD PRESSURE: 124 MMHG | DIASTOLIC BLOOD PRESSURE: 70 MMHG | BODY MASS INDEX: 25.53 KG/M2 | WEIGHT: 163 LBS

## 2018-10-04 DIAGNOSIS — E78.5 ELEVATED LIPIDS: Primary | ICD-10-CM

## 2018-10-04 DIAGNOSIS — E03.9 ACQUIRED HYPOTHYROIDISM: ICD-10-CM

## 2018-10-04 DIAGNOSIS — J45.20 MILD INTERMITTENT ASTHMA WITHOUT COMPLICATION: ICD-10-CM

## 2018-10-04 DIAGNOSIS — G51.0 BELL'S PALSY: ICD-10-CM

## 2018-10-04 PROCEDURE — 1123F ACP DISCUSS/DSCN MKR DOCD: CPT | Performed by: INTERNAL MEDICINE

## 2018-10-04 PROCEDURE — 4040F PNEUMOC VAC/ADMIN/RCVD: CPT | Performed by: INTERNAL MEDICINE

## 2018-10-04 PROCEDURE — 1090F PRES/ABSN URINE INCON ASSESS: CPT | Performed by: INTERNAL MEDICINE

## 2018-10-04 PROCEDURE — 3017F COLORECTAL CA SCREEN DOC REV: CPT | Performed by: INTERNAL MEDICINE

## 2018-10-04 PROCEDURE — 1036F TOBACCO NON-USER: CPT | Performed by: INTERNAL MEDICINE

## 2018-10-04 PROCEDURE — G8419 CALC BMI OUT NRM PARAM NOF/U: HCPCS | Performed by: INTERNAL MEDICINE

## 2018-10-04 PROCEDURE — 99214 OFFICE O/P EST MOD 30 MIN: CPT | Performed by: INTERNAL MEDICINE

## 2018-10-04 PROCEDURE — G8427 DOCREV CUR MEDS BY ELIG CLIN: HCPCS | Performed by: INTERNAL MEDICINE

## 2018-10-04 PROCEDURE — G8484 FLU IMMUNIZE NO ADMIN: HCPCS | Performed by: INTERNAL MEDICINE

## 2018-10-04 PROCEDURE — G8400 PT W/DXA NO RESULTS DOC: HCPCS | Performed by: INTERNAL MEDICINE

## 2018-10-04 PROCEDURE — 1101F PT FALLS ASSESS-DOCD LE1/YR: CPT | Performed by: INTERNAL MEDICINE

## 2018-10-04 NOTE — PROGRESS NOTES
right facial weakness. I was unable to open the patient's eye against resistance. Patient's alert, cooperative, moves all 4 limbs, ambulates without difficulty, no slurred speech, no facial droop, good orientation. Good historian. Blood pressure 124/70, pulse 73, weight 163 lb (73.9 kg), SpO2 98 %, not currently breastfeeding. Assessment:      1. Bell's palsy improvement as noted above she feels now 85% improved. 2.  Hypothyroid: Stable  3. Lipids: Stable  4. Asthma: Stable despite difficulty when her  uses \"air freshener sprays\". 5.  Dry eyes        Plan:      #1. Continue present medicines  2. Follow-up with ophthalmology   3.   Gave slip to obtain fasting laboratory studies before next office visit  Return follow-up  Dr. Stephanie Isaacs MD

## 2018-12-04 RX ORDER — LEVOTHYROXINE SODIUM 0.05 MG/1
TABLET ORAL
Qty: 90 TABLET | Refills: 3 | Status: SHIPPED | OUTPATIENT
Start: 2018-12-04 | End: 2019-12-12 | Stop reason: SDUPTHER

## 2019-03-13 RX ORDER — SIMVASTATIN 40 MG
TABLET ORAL
Qty: 90 TABLET | Refills: 3 | Status: SHIPPED | OUTPATIENT
Start: 2019-03-13 | End: 2020-02-14

## 2019-04-12 ENCOUNTER — OFFICE VISIT (OUTPATIENT)
Dept: INTERNAL MEDICINE CLINIC | Age: 74
End: 2019-04-12
Payer: MEDICARE

## 2019-04-12 VITALS
WEIGHT: 163 LBS | SYSTOLIC BLOOD PRESSURE: 122 MMHG | BODY MASS INDEX: 25.53 KG/M2 | DIASTOLIC BLOOD PRESSURE: 68 MMHG | OXYGEN SATURATION: 98 % | HEART RATE: 71 BPM

## 2019-04-12 DIAGNOSIS — E78.5 ELEVATED LIPIDS: Primary | ICD-10-CM

## 2019-04-12 DIAGNOSIS — M26.623 BILATERAL TEMPOROMANDIBULAR JOINT PAIN: ICD-10-CM

## 2019-04-12 DIAGNOSIS — E03.9 HYPOTHYROIDISM, UNSPECIFIED TYPE: ICD-10-CM

## 2019-04-12 DIAGNOSIS — R09.81 SINUS CONGESTION: ICD-10-CM

## 2019-04-12 DIAGNOSIS — G51.0 BELL'S PALSY: ICD-10-CM

## 2019-04-12 DIAGNOSIS — F32.4 MAJOR DEPRESSIVE DISORDER IN PARTIAL REMISSION, UNSPECIFIED WHETHER RECURRENT (HCC): ICD-10-CM

## 2019-04-12 DIAGNOSIS — F32.5 MAJOR DEPRESSIVE DISORDER IN FULL REMISSION, UNSPECIFIED WHETHER RECURRENT (HCC): ICD-10-CM

## 2019-04-12 PROCEDURE — 3017F COLORECTAL CA SCREEN DOC REV: CPT | Performed by: INTERNAL MEDICINE

## 2019-04-12 PROCEDURE — G8419 CALC BMI OUT NRM PARAM NOF/U: HCPCS | Performed by: INTERNAL MEDICINE

## 2019-04-12 PROCEDURE — 1090F PRES/ABSN URINE INCON ASSESS: CPT | Performed by: INTERNAL MEDICINE

## 2019-04-12 PROCEDURE — G8400 PT W/DXA NO RESULTS DOC: HCPCS | Performed by: INTERNAL MEDICINE

## 2019-04-12 PROCEDURE — 4040F PNEUMOC VAC/ADMIN/RCVD: CPT | Performed by: INTERNAL MEDICINE

## 2019-04-12 PROCEDURE — 99214 OFFICE O/P EST MOD 30 MIN: CPT | Performed by: INTERNAL MEDICINE

## 2019-04-12 PROCEDURE — 1036F TOBACCO NON-USER: CPT | Performed by: INTERNAL MEDICINE

## 2019-04-12 PROCEDURE — 1123F ACP DISCUSS/DSCN MKR DOCD: CPT | Performed by: INTERNAL MEDICINE

## 2019-04-12 PROCEDURE — G8427 DOCREV CUR MEDS BY ELIG CLIN: HCPCS | Performed by: INTERNAL MEDICINE

## 2019-04-12 RX ORDER — SERTRALINE HYDROCHLORIDE 100 MG/1
50 TABLET, FILM COATED ORAL DAILY
Qty: 45 TABLET | Refills: 3 | Status: SHIPPED | OUTPATIENT
Start: 2019-04-12 | End: 2020-05-26 | Stop reason: SDUPTHER

## 2019-04-12 RX ORDER — SERTRALINE HYDROCHLORIDE 100 MG/1
100 TABLET, FILM COATED ORAL DAILY
COMMUNITY
End: 2019-04-12 | Stop reason: SDUPTHER

## 2019-04-12 NOTE — PROGRESS NOTES
Subjective:      Patient ID: Laura Urbina is a 72 y.o. female. CC: Hyperlipidemia  HPI: Patient with hypothyroid, hyperlipidemia, asthma, dry eyes, Bell's palsy. Patient relates Bell's palsy at least 80% improved. Review: 11/19/2018: [facility] mammogram: Unremarkable. Patient states over the last 2-3 months notes return of depression and restart Zoloft taking 100 mg 1/2 pill a day. This has been helpful. The last couple of weeks notes   sinus congestion with nasal congestion and drainage clear mostly snoring louder. History of allergies. Denies cough, fever chills or shakes, some mild ear discomfort but points to the TMJ area. Occasional nosebleed. Medicines and allergies reviewed. Health maintenance reviewed  Family history reviewed  Social history reviewed  Reviewed laboratory data 4/2/2019: Chemistry panel: Unremarkable. Lipid panel: Total cholesterol: 193. LDL cholesterol: 87.    Review of Systems    Review of Systems   Constitutional: negative   HENT:  Sinus congestion. Question TMJ  EYES: negative   Respiratory: negative   Gastrointestinal: negative   Endocrine: negative   Musculoskeletal: negative   Skin: negative   Allergic/Immunological: negative   Hematological: negative   Psychiatric/Behavorial:  Recurrence of depression on Zoloft. CV: negative   CNS: negative   :Negative   S/E:Negative  Renal: Negative      Objective:   Physical Exam Head/neck: Ears: Normal TM. No obstruction. Throat: No exudates, no erythema, no tonsillar enlargement. Neck: No lymphadenopathy. Eyes: EOMI, PERRLA with no nystagmus. Thyroid: Not palpable and nontender to touch.  mild tenderness about the TMJ area right greater than left. Lungs: Clear to auscultation. No wheezing. CV: S1-S2 normal.  SANDOVAL. Carotid: No bruit. Abdominal Examination: Bowel sounds present. Soft nontender. No mass no guarding or   rebound. Spine/extremities: No edema. No tenderness to palpation.      Skin: No rash  CNS: Patient is alert, cooperative, moves all 4 limbs, ambulates without difficulty, light touch normal.  Deep tendon reflexes normal.  Good orientation. Blood pressure 122/68, pulse 71, weight 163 lb (73.9 kg), SpO2 98 %, not currently breastfeeding. Assessment:       1. Elevated lipids      Stable. - Lipid Panel; Future  - Comprehensive Metabolic Panel; Future    2. Hypothyroidism, unspecified type        - T4, Free; Future    3. Bell's palsy       Has improved. 4.  Bilateral pain about temporomandibular joint        5. Sinus congestion        6. Major depressive disorder in partial remission, unspecified whether recurrent ([facility]      Has improved back on Zoloft. - sertraline (ZOLOFT) 100 MG tablet; Take 0.5 tablets by mouth daily 1/2 tab po qd  Dispense: 45 tablet; Refill: 3    Quality & Risk Score Accuracy    Visit Dx:  F32.5 - Major depressive disorder in full remission, unspecified whether recurrent ([facility]  Assessment and plan:  Stable based upon symptoms and exam. Continue current treatment plan and follow up at least yearly. Last edited 04/12/19 19:05 EDT by Dennie Popper, MD               Plan:       1. Discussed use of Advil or Aleve for TMJ.  2.  Flonase over-the-counter  3. Claritin over-the-counter  4. Refilled Zoloft  5.   Gave slip to obtain laboratory study before next office visit  Return in follow-up  Dr. Juan Carlos Miramontes MD

## 2019-08-06 ENCOUNTER — APPOINTMENT (OUTPATIENT)
Dept: GENERAL RADIOLOGY | Age: 74
End: 2019-08-06
Payer: MEDICARE

## 2019-08-06 ENCOUNTER — HOSPITAL ENCOUNTER (EMERGENCY)
Age: 74
Discharge: HOME | End: 2019-08-06
Payer: MEDICARE

## 2019-08-06 VITALS
RESPIRATION RATE: 16 BRPM | HEIGHT: 68 IN | BODY MASS INDEX: 24.25 KG/M2 | OXYGEN SATURATION: 98 % | TEMPERATURE: 97.7 F | HEART RATE: 60 BPM | DIASTOLIC BLOOD PRESSURE: 73 MMHG | SYSTOLIC BLOOD PRESSURE: 143 MMHG | WEIGHT: 160 LBS

## 2019-08-06 DIAGNOSIS — S92.354A CLOSED NONDISPLACED FRACTURE OF FIFTH METATARSAL BONE OF RIGHT FOOT, INITIAL ENCOUNTER: Primary | ICD-10-CM

## 2019-08-06 PROCEDURE — 73630 X-RAY EXAM OF FOOT: CPT

## 2019-08-06 PROCEDURE — 99283 EMERGENCY DEPT VISIT LOW MDM: CPT

## 2019-08-06 PROCEDURE — 73610 X-RAY EXAM OF ANKLE: CPT

## 2019-08-06 ASSESSMENT — PAIN DESCRIPTION - ORIENTATION: ORIENTATION: RIGHT

## 2019-08-06 ASSESSMENT — PAIN DESCRIPTION - PAIN TYPE: TYPE: ACUTE PAIN

## 2019-08-06 ASSESSMENT — PAIN DESCRIPTION - LOCATION: LOCATION: FOOT;ANKLE

## 2019-08-06 ASSESSMENT — PAIN SCALES - GENERAL: PAINLEVEL_OUTOF10: 2

## 2019-08-06 NOTE — ED PROVIDER NOTES
patient family. Patient will follow up orthopedics for further evaluation/treatment. The patient was given strict return precautions as we discussed symptoms that would necessitate return to the ED. Patient will return to ED for new/worsening symptoms. The patient verbalized their understanding and agreement with the above plan. Please refer to AVS for further details regarding discharge instructions. No results found for this visit on 08/06/19. I estimate there is LOW risk for COMPARTMENT SYNDROME, DEEP VENOUS THROMBOSIS, SEPTIC ARTHRITIS, TENDON OR NEUROVASCULAR INJURY, thus I consider the discharge disposition reasonable. Liliana Mcdonald and I have discussed the diagnosis and risks, and we agree with discharging home to follow-up with their primary doctor or the referral orthopedist. We also discussed returning to the Emergency Department immediately if new or worsening symptoms occur. We have discussed the symptoms which are most concerning (e.g., changing or worsening pain, numbness, weakness) that necessitate immediate return. Final Impression    1. Closed nondisplaced fracture of fifth metatarsal bone of right foot, initial encounter        Blood pressure (!) 143/73, pulse 60, temperature 97.7 °F (36.5 °C), temperature source Oral, resp. rate 16, height 5' 7.5\" (1.715 m), weight 160 lb (72.6 kg), SpO2 98 %, not currently breastfeeding.mdm    Patient was sent home with a prescription for below medication/s. I did [place] patient on appropriate use of these medication. Discharge Medication List as of 8/6/2019  1:19 PM          FOLLOW UP  Cristina Loza DO  [address] [place] [address] [address] [facility]  [phone]    Call   follow up    [facility]  ED  [address] [facility] [address] [facility]  Go to   As needed, If symptoms worsen      DISPOSITION  Patient was discharged to home in good condition.     Comment: Please note this report has been produced using

## 2019-08-13 ENCOUNTER — OFFICE VISIT (OUTPATIENT)
Dept: ORTHOPEDIC SURGERY | Age: 74
End: 2019-08-13
Payer: MEDICARE

## 2019-08-13 VITALS — BODY MASS INDEX: 25.12 KG/M2 | WEIGHT: 160.05 LBS | HEIGHT: 67 IN

## 2019-08-13 DIAGNOSIS — S92.351A CLOSED DISPLACED FRACTURE OF FIFTH METATARSAL BONE OF RIGHT FOOT, INITIAL ENCOUNTER: ICD-10-CM

## 2019-08-13 DIAGNOSIS — R52 PAIN: Primary | ICD-10-CM

## 2019-08-13 PROCEDURE — E0114 CRUTCH UNDERARM PAIR NO WOOD: HCPCS | Performed by: ORTHOPAEDIC SURGERY

## 2019-08-13 PROCEDURE — 4040F PNEUMOC VAC/ADMIN/RCVD: CPT | Performed by: ORTHOPAEDIC SURGERY

## 2019-08-13 PROCEDURE — G8420 CALC BMI NORM PARAMETERS: HCPCS | Performed by: ORTHOPAEDIC SURGERY

## 2019-08-13 PROCEDURE — G8400 PT W/DXA NO RESULTS DOC: HCPCS | Performed by: ORTHOPAEDIC SURGERY

## 2019-08-13 PROCEDURE — 99203 OFFICE O/P NEW LOW 30 MIN: CPT | Performed by: ORTHOPAEDIC SURGERY

## 2019-08-13 PROCEDURE — 1123F ACP DISCUSS/DSCN MKR DOCD: CPT | Performed by: ORTHOPAEDIC SURGERY

## 2019-08-13 PROCEDURE — 28470 CLTX METATARSAL FX WO MNP EA: CPT | Performed by: ORTHOPAEDIC SURGERY

## 2019-08-13 PROCEDURE — G8427 DOCREV CUR MEDS BY ELIG CLIN: HCPCS | Performed by: ORTHOPAEDIC SURGERY

## 2019-08-13 PROCEDURE — 3017F COLORECTAL CA SCREEN DOC REV: CPT | Performed by: ORTHOPAEDIC SURGERY

## 2019-08-13 PROCEDURE — 1090F PRES/ABSN URINE INCON ASSESS: CPT | Performed by: ORTHOPAEDIC SURGERY

## 2019-08-13 PROCEDURE — 1036F TOBACCO NON-USER: CPT | Performed by: ORTHOPAEDIC SURGERY

## 2019-08-13 NOTE — PROGRESS NOTES
Chief Complaint    Follow-up (Righ foot injured steppin down the step in the garage )      History of Present Illness:  Kaye Parikh is a 72 y.o. femalehere for evaluation chief complaint of right foot pain. She states that on 8/6/2019 she missed a step sustaining a plantarflexion inversion injury to the right foot. She also bruised her left knee but that is getting better denies any other injuries. Currently rates her pain at 1 out of 10. Being on the foot makes it worse getting off of it makes it better. She is retired and has been walking and just the low tide boot. She wonders why it still swollen. Medical History:  Patient's medications, allergies, past medical, surgical, social and family histories were reviewed and updated as appropriate. Review of Systems:  Pertinent items are noted in HPI  Review of systems reviewed from Patient History Form dated on 8/13/2019 and available in the patient's chart under the Media tab. Vital Signs:  Ht 5' 7.32\" (1.71 m)   Wt 160 lb 0.9 oz (72.6 kg)   BMI 24.83 kg/m²     General Exam:   Constitutional: Patient is adequately groomed with no evidence of malnutrition  DTRs: Deep tendon reflexes are intact  Mental Status: The patient is oriented to time, place and person. The patient's mood and affect are appropriate. Lymphatic: The lymphatic examination bilaterally reveals all areas to be without enlargement or induration. Foot Examination:    Inspection: Moderate swelling and ecchymosis over the lateral aspect of the right foot and through the right shin    Palpation: Tenderness over the fifth metatarsal as well as ATFL and CFL    Range of Motion: 10 degrees of dorsiflexion 40 degrees of plantarflexion    Strength: 4/5 in the dorsiflexion eversion with no increased pain    Special Tests: Anterior drawer and talar tilt show mild laxity    Skin: There are no rashes, ulcerations or lesions.     Gait: Antalgic    Reflex 2+ and symmetric    Additional

## 2019-08-27 ENCOUNTER — OFFICE VISIT (OUTPATIENT)
Dept: ORTHOPEDIC SURGERY | Age: 74
End: 2019-08-27

## 2019-08-27 VITALS — BODY MASS INDEX: 25.12 KG/M2 | WEIGHT: 160.05 LBS | HEIGHT: 67 IN

## 2019-08-27 DIAGNOSIS — S92.351A CLOSED DISPLACED FRACTURE OF FIFTH METATARSAL BONE OF RIGHT FOOT, INITIAL ENCOUNTER: Primary | ICD-10-CM

## 2019-08-27 PROCEDURE — 99024 POSTOP FOLLOW-UP VISIT: CPT | Performed by: ORTHOPAEDIC SURGERY

## 2019-08-27 RX ORDER — MELOXICAM 15 MG/1
15 TABLET ORAL DAILY
Qty: 30 TABLET | Refills: 2 | Status: CANCELLED | OUTPATIENT
Start: 2019-08-27

## 2019-08-27 NOTE — PROGRESS NOTES
Subjective: Patient is here for follow-up of her right foot fifth metatarsal [place] fracture treated nonoperatively. She states she has no pain never really has had pain or swelling is gone down quite a bit she still using crutches and low tide boot but is getting antsy date of injury was 8/10/2019  Objective: Physical exam shows mild swelling through the lateral aspect of the right foot she still has some tenderness over the fifth metatarsal base peroneal tendons are intact nontender to anterior drawer and talar tilt  Imaging: 3 views of the right foot show no obvious change in position of the metatarsal base appears to be healing in  Assessment and plan: She needs to be careful about the amount of time on her feet. She was at a wedding over the weekend was on her feet a lot.   I will see her back in 2-1/2 weeks repeat x-rays hopefully she can begin full weightbearing and progress from there as tolerated she still may require surgical intervention at some point

## 2019-09-10 ENCOUNTER — OFFICE VISIT (OUTPATIENT)
Dept: ORTHOPEDIC SURGERY | Age: 74
End: 2019-09-10
Payer: MEDICARE

## 2019-09-10 VITALS — BODY MASS INDEX: 25.12 KG/M2 | HEIGHT: 67 IN | WEIGHT: 160.05 LBS

## 2019-09-10 DIAGNOSIS — S92.351A CLOSED DISPLACED FRACTURE OF FIFTH METATARSAL BONE OF RIGHT FOOT, INITIAL ENCOUNTER: Primary | ICD-10-CM

## 2019-09-10 PROCEDURE — L1902 AFO ANKLE GAUNTLET PRE OTS: HCPCS | Performed by: ORTHOPAEDIC SURGERY

## 2019-09-10 PROCEDURE — 99024 POSTOP FOLLOW-UP VISIT: CPT | Performed by: ORTHOPAEDIC SURGERY

## 2019-10-08 ENCOUNTER — OFFICE VISIT (OUTPATIENT)
Dept: ORTHOPEDIC SURGERY | Age: 74
End: 2019-10-08

## 2019-10-08 VITALS — HEIGHT: 67 IN | BODY MASS INDEX: 25.12 KG/M2 | WEIGHT: 160.05 LBS

## 2019-10-08 DIAGNOSIS — S92.351A CLOSED DISPLACED FRACTURE OF FIFTH METATARSAL BONE OF RIGHT FOOT, INITIAL ENCOUNTER: Primary | ICD-10-CM

## 2019-10-08 PROCEDURE — 99024 POSTOP FOLLOW-UP VISIT: CPT | Performed by: ORTHOPAEDIC SURGERY

## 2019-10-15 ENCOUNTER — OFFICE VISIT (OUTPATIENT)
Dept: INTERNAL MEDICINE CLINIC | Age: 74
End: 2019-10-15
Payer: MEDICARE

## 2019-10-15 VITALS
WEIGHT: 167 LBS | HEART RATE: 67 BPM | SYSTOLIC BLOOD PRESSURE: 124 MMHG | OXYGEN SATURATION: 99 % | BODY MASS INDEX: 25.91 KG/M2 | DIASTOLIC BLOOD PRESSURE: 78 MMHG

## 2019-10-15 DIAGNOSIS — Z78.0 MENOPAUSE: ICD-10-CM

## 2019-10-15 DIAGNOSIS — Z11.59 SPECIAL SCREENING EXAMINATION FOR VIRAL DISEASE: ICD-10-CM

## 2019-10-15 DIAGNOSIS — F32.4 MAJOR DEPRESSIVE DISORDER IN PARTIAL REMISSION, UNSPECIFIED WHETHER RECURRENT (HCC): ICD-10-CM

## 2019-10-15 DIAGNOSIS — G51.0 BELL'S PALSY: ICD-10-CM

## 2019-10-15 DIAGNOSIS — E03.9 HYPOTHYROIDISM, UNSPECIFIED TYPE: ICD-10-CM

## 2019-10-15 DIAGNOSIS — Z13.820 SPECIAL SCREENING FOR OSTEOPOROSIS: ICD-10-CM

## 2019-10-15 DIAGNOSIS — E78.5 ELEVATED LIPIDS: Primary | ICD-10-CM

## 2019-10-15 DIAGNOSIS — H92.03 OTALGIA OF BOTH EARS: ICD-10-CM

## 2019-10-15 DIAGNOSIS — L72.9 BENIGN SKIN CYST: ICD-10-CM

## 2019-10-15 DIAGNOSIS — S82.891A CLOSED FRACTURE OF RIGHT ANKLE, INITIAL ENCOUNTER: ICD-10-CM

## 2019-10-15 PROCEDURE — 1123F ACP DISCUSS/DSCN MKR DOCD: CPT | Performed by: INTERNAL MEDICINE

## 2019-10-15 PROCEDURE — 90653 IIV ADJUVANT VACCINE IM: CPT | Performed by: INTERNAL MEDICINE

## 2019-10-15 PROCEDURE — G0008 ADMIN INFLUENZA VIRUS VAC: HCPCS | Performed by: INTERNAL MEDICINE

## 2019-10-15 PROCEDURE — G8482 FLU IMMUNIZE ORDER/ADMIN: HCPCS | Performed by: INTERNAL MEDICINE

## 2019-10-15 PROCEDURE — G8400 PT W/DXA NO RESULTS DOC: HCPCS | Performed by: INTERNAL MEDICINE

## 2019-10-15 PROCEDURE — 4040F PNEUMOC VAC/ADMIN/RCVD: CPT | Performed by: INTERNAL MEDICINE

## 2019-10-15 PROCEDURE — 1036F TOBACCO NON-USER: CPT | Performed by: INTERNAL MEDICINE

## 2019-10-15 PROCEDURE — G8427 DOCREV CUR MEDS BY ELIG CLIN: HCPCS | Performed by: INTERNAL MEDICINE

## 2019-10-15 PROCEDURE — 1090F PRES/ABSN URINE INCON ASSESS: CPT | Performed by: INTERNAL MEDICINE

## 2019-10-15 PROCEDURE — 3017F COLORECTAL CA SCREEN DOC REV: CPT | Performed by: INTERNAL MEDICINE

## 2019-10-15 PROCEDURE — G8419 CALC BMI OUT NRM PARAM NOF/U: HCPCS | Performed by: INTERNAL MEDICINE

## 2019-10-15 PROCEDURE — 99214 OFFICE O/P EST MOD 30 MIN: CPT | Performed by: INTERNAL MEDICINE

## 2019-10-15 ASSESSMENT — PATIENT HEALTH QUESTIONNAIRE - PHQ9
SUM OF ALL RESPONSES TO PHQ9 QUESTIONS 1 & 2: 0
SUM OF ALL RESPONSES TO PHQ QUESTIONS 1-9: 0
SUM OF ALL RESPONSES TO PHQ QUESTIONS 1-9: 0
1. LITTLE INTEREST OR PLEASURE IN DOING THINGS: 0
2. FEELING DOWN, DEPRESSED OR HOPELESS: 0

## 2019-10-18 ENCOUNTER — HOSPITAL ENCOUNTER (OUTPATIENT)
Dept: WOMENS IMAGING | Age: 74
Discharge: HOME | End: 2019-10-18
Payer: MEDICARE

## 2019-10-18 DIAGNOSIS — Z78.0 MENOPAUSE: ICD-10-CM

## 2019-10-18 PROCEDURE — 77080 DXA BONE DENSITY AXIAL: CPT

## 2019-10-21 ENCOUNTER — OFFICE VISIT (OUTPATIENT)
Dept: ENT CLINIC | Age: 74
End: 2019-10-21
Payer: MEDICARE

## 2019-10-21 VITALS
TEMPERATURE: 98 F | DIASTOLIC BLOOD PRESSURE: 68 MMHG | HEIGHT: 67 IN | WEIGHT: 168.4 LBS | SYSTOLIC BLOOD PRESSURE: 113 MMHG | BODY MASS INDEX: 26.43 KG/M2 | HEART RATE: 77 BPM

## 2019-10-21 DIAGNOSIS — H69.83 DYSFUNCTION OF BOTH EUSTACHIAN TUBES: ICD-10-CM

## 2019-10-21 DIAGNOSIS — H92.03 OTALGIA, BILATERAL: Primary | ICD-10-CM

## 2019-10-21 PROCEDURE — 99203 OFFICE O/P NEW LOW 30 MIN: CPT | Performed by: OTOLARYNGOLOGY

## 2019-10-21 ASSESSMENT — ENCOUNTER SYMPTOMS
COUGH: 0
FACIAL SWELLING: 0
VOICE CHANGE: 0
TROUBLE SWALLOWING: 0
SHORTNESS OF BREATH: 0
EYE ITCHING: 0
APNEA: 0
SORE THROAT: 0
SINUS PRESSURE: 0

## 2019-10-22 ENCOUNTER — TELEPHONE (OUTPATIENT)
Dept: INTERNAL MEDICINE CLINIC | Age: 74
End: 2019-10-22

## 2019-12-13 RX ORDER — LEVOTHYROXINE SODIUM 0.05 MG/1
TABLET ORAL
Qty: 90 TABLET | Refills: 0 | Status: SHIPPED | OUTPATIENT
Start: 2019-12-13 | End: 2020-02-14

## 2020-05-26 ENCOUNTER — VIRTUAL VISIT (OUTPATIENT)
Dept: INTERNAL MEDICINE CLINIC | Age: 75
End: 2020-05-26
Payer: MEDICARE

## 2020-05-26 ENCOUNTER — TELEPHONE (OUTPATIENT)
Dept: INTERNAL MEDICINE CLINIC | Age: 75
End: 2020-05-26

## 2020-05-26 VITALS — OXYGEN SATURATION: 98 % | HEART RATE: 80 BPM

## 2020-05-26 PROCEDURE — G8399 PT W/DXA RESULTS DOCUMENT: HCPCS | Performed by: INTERNAL MEDICINE

## 2020-05-26 PROCEDURE — 1090F PRES/ABSN URINE INCON ASSESS: CPT | Performed by: INTERNAL MEDICINE

## 2020-05-26 PROCEDURE — 1123F ACP DISCUSS/DSCN MKR DOCD: CPT | Performed by: INTERNAL MEDICINE

## 2020-05-26 PROCEDURE — G8427 DOCREV CUR MEDS BY ELIG CLIN: HCPCS | Performed by: INTERNAL MEDICINE

## 2020-05-26 PROCEDURE — 3017F COLORECTAL CA SCREEN DOC REV: CPT | Performed by: INTERNAL MEDICINE

## 2020-05-26 PROCEDURE — 99214 OFFICE O/P EST MOD 30 MIN: CPT | Performed by: INTERNAL MEDICINE

## 2020-05-26 PROCEDURE — 4040F PNEUMOC VAC/ADMIN/RCVD: CPT | Performed by: INTERNAL MEDICINE

## 2020-05-26 RX ORDER — TRAZODONE HYDROCHLORIDE 50 MG/1
TABLET ORAL
Qty: 30 TABLET | Refills: 2 | Status: SHIPPED | OUTPATIENT
Start: 2020-05-26 | End: 2020-06-16 | Stop reason: SDUPTHER

## 2020-05-26 RX ORDER — SERTRALINE HYDROCHLORIDE 100 MG/1
100 TABLET, FILM COATED ORAL DAILY
Qty: 45 TABLET | Refills: 3 | Status: SHIPPED | OUTPATIENT
Start: 2020-05-26 | End: 2020-06-16 | Stop reason: SDUPTHER

## 2020-05-26 RX ORDER — SERTRALINE HYDROCHLORIDE 100 MG/1
100 TABLET, FILM COATED ORAL DAILY
Qty: 45 TABLET | Refills: 3 | Status: CANCELLED | OUTPATIENT
Start: 2020-05-26

## 2020-05-26 NOTE — TELEPHONE ENCOUNTER
Needs for them to [facility] only the     Refill request for Sertraline 100 MG medication.      Name of Anna Diaz    Last visit - 10/15/2019       Pending visit -   Future Appointments   Date Time Provider Juliano Begum   11/30/2020  8:00 AM MD LOLY Vieira AND VERONICA SALCIDO         Last refill -5/26/20    Medication Contract signed -  Dio diaz-     Additional Comments

## 2020-05-26 NOTE — PROGRESS NOTES
2020    TELEHEALTH EVALUATION -- Audio/Visual (During OPJIW-75 public health emergency)  CC: Depression  HPI:    Segun Forrest (:  1945) has requested an audio/video evaluation for the following concern(s):    Patient with hypothyroid, depression, anxiety, insomnia, asthma, GERD, IBS, hyperlipidemia, history of Bell's palsy. Reviewed last office visit with me 10/15/2019. Discussed the recent death of her . She notes difficulty with insomnia as well as depression despite Zoloft 100 mg taking 1/2 pill a day. Reviewed laboratory data 2020: Chemistry panel: Glucose: 104. Lipid panel: Total cholesterol: 190. LDL cholesterol: 78.  Hepatitis C is negative. DEXA scan 10/18/2019: DX: Osteopenia. She has been on calcium and vitamin D3. Health maintenance: A [place].  TSH. Shingles. Td.  Office visit: 10/21/2019: ENT: Dr. Omega Amanda. DX: Bilateral eustachian tube dysfunction. Treated with Flonase. Review of Systems   Review of Systems   Constitutional: negative   HENT: negative   EYES: negative   Respiratory: negative   Gastrointestinal: negative   Endocrine: negative   Musculoskeletal: negative   Skin: negative   Allergic/Immunological: negative   Hematological: negative   Psychiatric/Behavorial: Depression and anxiety as noted above. Insomnia. CV: negative   CNS: negative   :Negative   S/E:Negative  Renal: Negative        Prior to Visit Medications    Medication Sig Taking? Authorizing Provider   sertraline (ZOLOFT) 100 MG tablet Take 1 tablet by mouth daily Depression medicine. Yes Jayla Linda MD   traZODone (DESYREL) 50 MG tablet 1/2 to 1 pill 1 hour before bedtime for sleep.  Yes Jayla Linda MD   simvastatin (ZOCOR) 40 MG tablet TAKE 1 TABLET EVERY EVENING FOR HIGH CHOLESTEROL Yes Jayla Linda MD   levothyroxine (SYNTHROID) 50 MCG tablet TAKE 1 TABLET EVERY DAY (THYROID MEDICINE) Yes Jayla Linda MD       Social History     Tobacco Use    Smoking status: Former Smoker Packs/day: 0.50     Years: 6.00     Pack years: 3.00     Types: Cigarettes     Start date: 1966     Last attempt to quit: 1972     Years since quittin.4    Smokeless tobacco: Never Used   Substance Use Topics    Alcohol use: Yes     Comment: 2 a month    Drug use: No        Allergies   Allergen Reactions    Macrobid [Nitrofurantoin Monohyd Macro] Rash    Sulfa Antibiotics Nausea Only and Other (See Comments)     Stomach nausea   ,   Past Medical History:   Diagnosis Date    Anemia     Anxiety     Asthma     PFT's    Back strain     Bell's palsy 2018    Rt Side    Chest pain 2016    GXT: Normal.    Chronic back pain     Cystitis     Depression     Dermatitis     Fibroids     GERD (gastroesophageal reflux disease)     Hematuria '    Cystoscopy & IVP  (---)    History of recurrent UTIs     Hyperlipidemia     Hypothyroidism     Insomnia     Irritable bowel syndrome     Palpitations     Pneumonia    ,   Past Surgical History:   Procedure Laterality Date    CHOLECYSTECTOMY      COLONOSCOPY      Neg.     COLONOSCOPY  10/1/14    Tubular Adenoma    COLONOSCOPY  2017     Hyperplastic Polyps ×2: Redo 5 years    CYSTOSCOPY      HYSTERECTOMY, TOTAL ABDOMINAL      TONSILLECTOMY AND ADENOIDECTOMY      TUBAL LIGATION     ,   Social History     Tobacco Use    Smoking status: Former Smoker     Packs/day: 0.50     Years: 6.00     Pack years: 3.00     Types: Cigarettes     Start date: 1966     Last attempt to quit: 1972     Years since quittin.4    Smokeless tobacco: Never Used   Substance Use Topics    Alcohol use: Yes     Comment: 2 a month    Drug use: No   ,   Family History   Problem Relation Age of Onset    Heart Disease Mother     High Blood Pressure Mother     High Cholesterol Mother     Substance Abuse Mother         Tob. use    Cancer Mother         basal cell    Emphysema Mother     Heart Failure Mother     Hypertension

## 2020-06-16 ENCOUNTER — TELEPHONE (OUTPATIENT)
Dept: INTERNAL MEDICINE CLINIC | Age: 75
End: 2020-06-16

## 2020-06-16 RX ORDER — TRAZODONE HYDROCHLORIDE 50 MG/1
TABLET ORAL
Qty: 135 TABLET | Refills: 1 | Status: SHIPPED | OUTPATIENT
Start: 2020-06-16 | End: 2020-11-25

## 2020-06-16 RX ORDER — SERTRALINE HYDROCHLORIDE 100 MG/1
150 TABLET, FILM COATED ORAL DAILY
Qty: 135 TABLET | Refills: 1 | Status: SHIPPED | OUTPATIENT
Start: 2020-06-16 | End: 2021-07-01

## 2020-06-16 NOTE — TELEPHONE ENCOUNTER
Pt is doing ok, sort of. She has had to take a whole tablet of trazadone and still it does not do much. On the increased sertraline, it does not seem to help much but she wants a refill sent through mail order for 1 tablet a day. She wants to leave it the same though.

## 2020-07-24 ENCOUNTER — OFFICE VISIT (OUTPATIENT)
Dept: PRIMARY CARE CLINIC | Age: 75
End: 2020-07-24
Payer: MEDICARE

## 2020-07-24 PROCEDURE — G8428 CUR MEDS NOT DOCUMENT: HCPCS | Performed by: NURSE PRACTITIONER

## 2020-07-24 PROCEDURE — G8417 CALC BMI ABV UP PARAM F/U: HCPCS | Performed by: NURSE PRACTITIONER

## 2020-07-24 PROCEDURE — 99211 OFF/OP EST MAY X REQ PHY/QHP: CPT | Performed by: NURSE PRACTITIONER

## 2020-07-24 NOTE — PROGRESS NOTES
Samantha Jalloh received a viral test for COVID-19. They were educated on isolation and quarantine as appropriate. For any symptoms, they were directed to seek care from their PCP, given contact information to establish with a doctor, directed to an urgent care or the emergency room.

## 2020-07-26 LAB
SARS-COV-2: NOT DETECTED
SOURCE: NORMAL

## 2020-09-09 ENCOUNTER — TELEPHONE (OUTPATIENT)
Dept: INTERNAL MEDICINE CLINIC | Age: 75
End: 2020-09-09

## 2020-09-10 ENCOUNTER — HOSPITAL ENCOUNTER (OUTPATIENT)
Age: 75
Discharge: HOME | End: 2020-09-10
Payer: MEDICARE

## 2020-09-10 LAB
BACTERIA: ABNORMAL /HPF
BILIRUBIN URINE: NEGATIVE
BLOOD, URINE: ABNORMAL
CLARITY: ABNORMAL
COLOR: YELLOW
EPITHELIAL CELLS, UA: ABNORMAL /HPF (ref 0–5)
GLUCOSE URINE: NEGATIVE MG/DL
KETONES, URINE: NEGATIVE MG/DL
LEUKOCYTE ESTERASE, URINE: ABNORMAL
MICROSCOPIC EXAMINATION: YES
NITRITE, URINE: NEGATIVE
PH UA: 6 (ref 5–8)
PROTEIN UA: ABNORMAL MG/DL
RBC UA: ABNORMAL /HPF (ref 0–4)
SPECIFIC GRAVITY UA: 1.01 (ref 1–1.03)
URINE REFLEX TO CULTURE: YES
URINE TYPE: ABNORMAL
UROBILINOGEN, URINE: 0.2 E.U./DL
WBC UA: ABNORMAL /HPF (ref 0–5)

## 2020-09-10 PROCEDURE — 87086 URINE CULTURE/COLONY COUNT: CPT

## 2020-09-10 PROCEDURE — 87186 SC STD MICRODIL/AGAR DIL: CPT

## 2020-09-10 PROCEDURE — 87077 CULTURE AEROBIC IDENTIFY: CPT

## 2020-09-10 PROCEDURE — 81001 URINALYSIS AUTO W/SCOPE: CPT

## 2020-09-10 RX ORDER — AMOXICILLIN AND CLAVULANATE POTASSIUM 875; 125 MG/1; MG/1
1 TABLET, FILM COATED ORAL 2 TIMES DAILY
Qty: 14 TABLET | Refills: 0 | Status: SHIPPED | OUTPATIENT
Start: 2020-09-10 | End: 2020-09-17

## 2020-09-12 LAB
ORGANISM: ABNORMAL
URINE CULTURE, ROUTINE: ABNORMAL

## 2020-09-21 ENCOUNTER — TELEPHONE (OUTPATIENT)
Dept: INTERNAL MEDICINE CLINIC | Age: 75
End: 2020-09-21

## 2020-09-21 RX ORDER — CIPROFLOXACIN 250 MG/1
250 TABLET, FILM COATED ORAL 2 TIMES DAILY
Qty: 14 TABLET | Refills: 0 | Status: SHIPPED | OUTPATIENT
Start: 2020-09-21 | End: 2020-09-28

## 2020-09-21 NOTE — TELEPHONE ENCOUNTER
----- Message from [facility] sent at 9/21/2020  9:01 AM EDT -----  Subject: Message to Provider    QUESTIONS  Information for Provider? Patient has completed her medication on Thursday   for UTI and believes she reoccurring symptoms are starting again. patient   ask for notification of any test that need to be scheduled and/or done. (Last test scheduled wasn't in MyChart per patient). ---------------------------------------------------------------------------  --------------  Stella CAVANAUGH  What is the best way for the office to contact you? OK to leave message on   voicemail  Preferred Call Back Phone Number? [phone]  ---------------------------------------------------------------------------  --------------  SCRIPT ANSWERS  Relationship to Patient?  Self

## 2020-10-18 ENCOUNTER — HOSPITAL ENCOUNTER (EMERGENCY)
Age: 75
Discharge: HOME | End: 2020-10-18
Payer: MEDICARE

## 2020-10-18 ENCOUNTER — APPOINTMENT (OUTPATIENT)
Dept: GENERAL RADIOLOGY | Age: 75
End: 2020-10-18
Payer: MEDICARE

## 2020-10-18 VITALS
BODY MASS INDEX: 25.01 KG/M2 | HEIGHT: 68 IN | HEART RATE: 91 BPM | SYSTOLIC BLOOD PRESSURE: 127 MMHG | OXYGEN SATURATION: 100 % | RESPIRATION RATE: 16 BRPM | WEIGHT: 165 LBS | TEMPERATURE: 97.7 F | DIASTOLIC BLOOD PRESSURE: 65 MMHG

## 2020-10-18 PROCEDURE — 73130 X-RAY EXAM OF HAND: CPT

## 2020-10-18 PROCEDURE — 90715 TDAP VACCINE 7 YRS/> IM: CPT | Performed by: NURSE PRACTITIONER

## 2020-10-18 PROCEDURE — 99283 EMERGENCY DEPT VISIT LOW MDM: CPT

## 2020-10-18 PROCEDURE — 6360000002 HC RX W HCPCS: Performed by: NURSE PRACTITIONER

## 2020-10-18 PROCEDURE — 6370000000 HC RX 637 (ALT 250 FOR IP): Performed by: NURSE PRACTITIONER

## 2020-10-18 PROCEDURE — 90471 IMMUNIZATION ADMIN: CPT | Performed by: NURSE PRACTITIONER

## 2020-10-18 RX ORDER — ACETAMINOPHEN 500 MG
1000 TABLET ORAL ONCE
Status: COMPLETED | OUTPATIENT
Start: 2020-10-18 | End: 2020-10-18

## 2020-10-18 RX ORDER — AMOXICILLIN AND CLAVULANATE POTASSIUM 875; 125 MG/1; MG/1
1 TABLET, FILM COATED ORAL ONCE
Status: COMPLETED | OUTPATIENT
Start: 2020-10-18 | End: 2020-10-18

## 2020-10-18 RX ORDER — IBUPROFEN 800 MG/1
800 TABLET ORAL ONCE
Status: COMPLETED | OUTPATIENT
Start: 2020-10-18 | End: 2020-10-18

## 2020-10-18 RX ORDER — ACETAMINOPHEN 500 MG
1000 TABLET ORAL 4 TIMES DAILY PRN
Qty: 60 TABLET | Refills: 0 | Status: SHIPPED | OUTPATIENT
Start: 2020-10-18 | End: 2020-11-25

## 2020-10-18 RX ORDER — AMOXICILLIN AND CLAVULANATE POTASSIUM 875; 125 MG/1; MG/1
1 TABLET, FILM COATED ORAL 2 TIMES DAILY
Qty: 20 TABLET | Refills: 0 | Status: SHIPPED | OUTPATIENT
Start: 2020-10-18 | End: 2020-10-28

## 2020-10-18 RX ADMIN — ACETAMINOPHEN 1000 MG: 500 TABLET ORAL at 19:16

## 2020-10-18 RX ADMIN — IBUPROFEN 800 MG: 800 TABLET, FILM COATED ORAL at 19:16

## 2020-10-18 RX ADMIN — TETANUS TOXOID, REDUCED DIPHTHERIA TOXOID AND ACELLULAR PERTUSSIS VACCINE, ADSORBED 0.5 ML: 5; 2.5; 8; 8; 2.5 SUSPENSION INTRAMUSCULAR at 19:16

## 2020-10-18 RX ADMIN — AMOXICILLIN AND CLAVULANATE POTASSIUM 1 TABLET: 875; 125 TABLET, FILM COATED ORAL at 19:16

## 2020-10-18 ASSESSMENT — PAIN SCALES - GENERAL: PAINLEVEL_OUTOF10: 3

## 2020-10-18 NOTE — ED PROVIDER NOTES
Dermatitis     Fibroids     GERD (gastroesophageal reflux disease)     Hematuria '00    Cystoscopy & IVP  (---)    History of recurrent UTIs     Hyperlipidemia     Hypothyroidism 06/05    Insomnia     Irritable bowel syndrome     Palpitations     Pneumonia 11/93     Past Surgical History:   Procedure Laterality Date    CHOLECYSTECTOMY      COLONOSCOPY  01/04    Neg.  COLONOSCOPY  10/1/14    Tubular Adenoma    COLONOSCOPY  11/07/2017     Hyperplastic Polyps ×2: Redo 5 years    CYSTOSCOPY      HYSTERECTOMY, TOTAL ABDOMINAL      TONSILLECTOMY AND ADENOIDECTOMY      TUBAL LIGATION          CURRENT MEDICATIONS (may include discharge medications prescribed in the ED)  Current Outpatient Rx   Medication Sig Dispense Refill    amoxicillin-clavulanate (AUGMENTIN) 875-125 MG per tablet Take 1 tablet by mouth 2 times daily for 10 days 20 tablet 0    acetaminophen (TYLENOL) 500 MG tablet Take 2 tablets by mouth 4 times daily as needed for Pain 60 tablet 0    mupirocin (BACTROBAN) 2 % ointment Apply 3 times daily. 1 Tube 0    sertraline (ZOLOFT) 100 MG tablet Take 1.5 tablets by mouth daily Depression medicine. 135 tablet 1    simvastatin (ZOCOR) 40 MG tablet TAKE 1 TABLET EVERY EVENING FOR HIGH CHOLESTEROL 90 tablet 2    levothyroxine (SYNTHROID) 50 MCG tablet TAKE 1 TABLET EVERY DAY (THYROID MEDICINE) 90 tablet 2    traZODone (DESYREL) 50 MG tablet 1 to 1 1/2  pill 1 hour before bedtime for sleep. 135 tablet 1       ALLERGIES   Allergies   Allergen Reactions    Macrobid [Nitrofurantoin Monohyd Macro] Rash    Sulfa Antibiotics Nausea Only and Other (See Comments)     Stomach nausea        SOCIAL & FAMILY HISTORY   Social History     Socioeconomic History    Marital status:       Spouse name: None    Number of children: None    Years of education: None    Highest education level: None   Occupational History    None   Social Needs    Financial resource strain: None    Food insecurity Worry: None     Inability: None    Transportation needs     Medical: None     Non-medical: None   Tobacco Use    Smoking status: Former Smoker     Packs/day: 0.50     Years: 6.00     Pack years: 3.00     Types: Cigarettes     Start date: 1966     Last attempt to quit: 1972     Years since quittin.9    Smokeless tobacco: Never Used   Substance and Sexual Activity    Alcohol use: Yes     Comment: 2 a month    Drug use: No    Sexual activity: None   Lifestyle    Physical activity     Days per week: None     Minutes per session: None    Stress: None   Relationships    Social connections     Talks on phone: None     Gets together: None     Attends Christianity service: None     Active member of club or organization: None     Attends meetings of clubs or organizations: None     Relationship status: None    Intimate partner violence     Fear of current or ex partner: None     Emotionally abused: None     Physically abused: None     Forced sexual activity: None   Other Topics Concern    None   Social History Narrative    None     Family History   Problem Relation Age of Onset    Heart Disease Mother     High Blood Pressure Mother     High Cholesterol Mother     Substance Abuse Mother         Tob. use    Cancer Mother         basal cell    Emphysema Mother     Heart Failure Mother     Hypertension Mother     Diabetes Father     Alcohol Abuse Father     Arrhythmia Father     Hypertension Father     Cancer Father         bladder    Asthma Sister     Cancer Maternal Aunt     Heart Failure Maternal Grandmother     Emphysema Maternal Grandfather         PHYSICAL EXAM   VITAL SIGNS: /65   Pulse 91   Temp 97.7 °F (36.5 °C) (Oral)   Resp 16   Ht 5' 7.5\" (1.715 m)   Wt 165 lb (74.8 kg)   SpO2 100%   BMI 25.46 kg/m²   Constitutional: Well developed, well nourished, no acute distress   HENT: Atraumatic, no trismus  NECK:  Supple, No neck swelling  Respiratory: No respiratory distress  Cardiovascular: No JVD   GI: Nondistended  Musculoskeletal: No acute deformities and no obvious joint effusions  Vascular: left radial pulse 2+, capillary refill less than 2 seconds  Integument:  The area of the bite is a laceration along the second metacarpal measuring approximately 2 cm in length. There is no crepitus and there is no palpable foreign body. There are no petechiae, pustules, purpura or bullae. Neurologic:  Awake alert, no slurred speech, sensory and motor intact    RADIOLOGY  XR HAND LEFT (MIN 3 VIEWS)   Final Result   No fracture or radiopaque foreign body visualized. ED COURSE & MEDICAL DECISION MAKING   See chart for details of medications ordered. We discussed the issue of likelihood of rabies in the offending animal and risk/benefits of the rabies vaccination. I explained to the patient that there have been no incidences of rabies from dog bites in [place], and that vaccination is currently not recommended by the Health Department. Differential diagnosis: Cellulitis, abscess, foreign body retention (tooth), tetanus, rabies, deep space infection, osteomyelitis, septic joint, other     Patient is afebrile and nontoxic in appearance. Plain films as above, no retained foreign body. I'll prescribe Bactroban ointmint and Augmentin for prophylaxis and recommend close outpatient follow-up. Verbalized understanding. She is to follow-up with Dr. Ascencion Cortes for wound recheck in the next 48 to 72 hours. She is to return immediately for any new or worsening symptoms. She verbalized understanding with no further questions or concerns. I estimate there is LOW risk for COMPARTMENT SYNDROME, NECROTIZING FASCIITIS, TENDON OR NEUROVASCULAR INJURY, or FOREIGN BODY, thus I consider the discharge disposition reasonable. Also, there is no evidence or peritonitis, sepsis, or toxicity.  Chente Dumont and I have discussed the diagnosis and risks, and we agree with discharging home to follow-up with their primary doctor. We also discussed returning to the Emergency Department immediately if new or worsening symptoms occur. We have discussed the symptoms which are most concerning (e.g., changing or worsening pain, fever, numbness, weakness, cool or painful digits) that necessitate immediate return. Discharge Vital Signs:  Blood pressure 127/65, pulse 91, temperature 97.7 °F (36.5 °C), temperature source Oral, resp. rate 16, height 5' 7.5\" (1.715 m), weight 165 lb (74.8 kg), SpO2 100 %, not currently breastfeeding. I instructed the patient to follow up as an outpatient in 2 days with primary care for wound recheck. I instructed the patient to return to the ED immediately for any new or worsening symptoms. The patient verbalizes understanding. FINAL IMPRESSION   1.  Dog bite of left hand with infection, initial encounter        PLAN  Discharge with outpatient follow-up, discharge instructions, antibiotics (see EMR)    (Please note that this note was completed with a voice recognition program.  Every attempt was made to edit the dictations, but inevitably there remain words that are mis-transcribed.)          Carson Doyle, CASI - CNP  10/18/20 1935

## 2020-10-18 NOTE — ED NOTES
Pt scripts x2 instructed to follow up with PCP for wound recheck. Assessed per Josafat Anna NP.      Ella Holman, TAMIKO  67/97/62 [phone]

## 2020-10-18 NOTE — ED NOTES
Pt left hand dog bite cleansed with Hibiclens/Normal Saline/applied with gauze/kerlex. Ice pack provided for comfort.      Krystyna Fischer LPN  81/70/09 [phone]

## 2020-10-19 ENCOUNTER — CARE COORDINATION (OUTPATIENT)
Dept: CARE COORDINATION | Age: 75
End: 2020-10-19

## 2020-10-19 NOTE — CARE COORDINATION
ER follow up call completed. Patient seen in ER for dog bit on 10/18/20. She has started on her antibiotics and has been cleaning he wound as needed and has a follow up on Thursday with NP. Reviewed s/s of covid and care at home. No additional flu like symptoms. She is agreeable to get well loop and additional resources for follow up sent via My chart. Additionally assess for care coordination needs. Patient declined any need. She is independent in her care at this time and requires no additional services. Patient contacted regarding recent discharge and COVID-19 risk. Discussed COVID-19 related testing which was not done at this time. Test results were not done. Patient informed of results, if available? No     Care Transition Nurse/ Ambulatory Care Manager contacted the patient by telephone to perform post discharge assessment. Verified name and  with patient as identifiers. Patient has following risk factors of: asthma. CTN/ACM reviewed discharge instructions, medical action plan and red flags related to discharge diagnosis. Reviewed and educated them on any new and changed medications related to discharge diagnosis. Advised obtaining a 90-day supply of all daily and as-needed medications. Education provided regarding infection prevention, and signs and symptoms of COVID-19 and when to seek medical attention with patient who verbalized understanding. Discussed exposure protocols and quarantine from [address] you at higher risk for severe illness  and given an opportunity for questions and concerns. The patient agrees to contact the COVID-19 hotline 978-584-1649 or PCP office for questions related to their healthcare. CTN/ACM provided contact information for future reference. From CDC: Are you at higher risk for severe illness?  Wash your hands often.  Avoid close contact (6 feet, which is about two arm lengths) with people who are sick.    Put distance between yourself and other people if COVID-19 is spreading in your community.  Clean and disinfect frequently touched surfaces.  Avoid all cruise travel and non-essential air travel.  Call your healthcare professional if you have concerns about COVID-19 and your underlying condition or if you are sick. For more information on steps you can take to protect yourself, see CDC's How to Protect Yourself    Pt will be further monitored by COVID Loop Team based on severity of symptoms and risk factors.

## 2020-10-22 ENCOUNTER — OFFICE VISIT (OUTPATIENT)
Dept: INTERNAL MEDICINE CLINIC | Age: 75
End: 2020-10-22
Payer: MEDICARE

## 2020-10-22 VITALS
SYSTOLIC BLOOD PRESSURE: 130 MMHG | WEIGHT: 171 LBS | BODY MASS INDEX: 26.39 KG/M2 | TEMPERATURE: 97.4 F | HEART RATE: 70 BPM | OXYGEN SATURATION: 98 % | DIASTOLIC BLOOD PRESSURE: 80 MMHG

## 2020-10-22 PROCEDURE — 99213 OFFICE O/P EST LOW 20 MIN: CPT | Performed by: NURSE PRACTITIONER

## 2020-10-22 PROCEDURE — 3017F COLORECTAL CA SCREEN DOC REV: CPT | Performed by: NURSE PRACTITIONER

## 2020-10-22 PROCEDURE — 1036F TOBACCO NON-USER: CPT | Performed by: NURSE PRACTITIONER

## 2020-10-22 PROCEDURE — G8399 PT W/DXA RESULTS DOCUMENT: HCPCS | Performed by: NURSE PRACTITIONER

## 2020-10-22 PROCEDURE — G8427 DOCREV CUR MEDS BY ELIG CLIN: HCPCS | Performed by: NURSE PRACTITIONER

## 2020-10-22 PROCEDURE — 4040F PNEUMOC VAC/ADMIN/RCVD: CPT | Performed by: NURSE PRACTITIONER

## 2020-10-22 PROCEDURE — G8484 FLU IMMUNIZE NO ADMIN: HCPCS | Performed by: NURSE PRACTITIONER

## 2020-10-22 PROCEDURE — 1123F ACP DISCUSS/DSCN MKR DOCD: CPT | Performed by: NURSE PRACTITIONER

## 2020-10-22 PROCEDURE — 1090F PRES/ABSN URINE INCON ASSESS: CPT | Performed by: NURSE PRACTITIONER

## 2020-10-22 PROCEDURE — G8417 CALC BMI ABV UP PARAM F/U: HCPCS | Performed by: NURSE PRACTITIONER

## 2020-10-22 ASSESSMENT — ENCOUNTER SYMPTOMS
CONSTIPATION: 0
SHORTNESS OF BREATH: 0
NAUSEA: 0
COUGH: 0
ABDOMINAL PAIN: 0
WHEEZING: 0
BLOOD IN STOOL: 0
DIARRHEA: 0
EYE DISCHARGE: 0
VOMITING: 0

## 2020-10-22 NOTE — PROGRESS NOTES
10/22/20    Kina Greco  69 y.o.  female    Chief Complaint   Patient presents with    Animal Bite         HPI:   Pt presents for FU of ER treatment for a dog bite. She was taking care of neighbors dog when she was bitten on Sat. On Sun she went to the ER. Tetanus shot is UTD, She is currently on augmentin twice a day and bactroban ointment. /80   Pulse 70   Temp 97.4 °F (36.3 °C)   Wt 171 lb (77.6 kg)   SpO2 98%   BMI 26.39 kg/m²        Current Outpatient Medications   Medication Sig Dispense Refill    amoxicillin-clavulanate (AUGMENTIN) 875-125 MG per tablet Take 1 tablet by mouth 2 times daily for 10 days 20 tablet 0    mupirocin (BACTROBAN) 2 % ointment Apply 3 times daily. 1 Tube 0    sertraline (ZOLOFT) 100 MG tablet Take 1.5 tablets by mouth daily Depression medicine. 135 tablet 1    simvastatin (ZOCOR) 40 MG tablet TAKE 1 TABLET EVERY EVENING FOR HIGH CHOLESTEROL 90 tablet 2    levothyroxine (SYNTHROID) 50 MCG tablet TAKE 1 TABLET EVERY DAY (THYROID MEDICINE) 90 tablet 2    acetaminophen (TYLENOL) 500 MG tablet Take 2 tablets by mouth 4 times daily as needed for Pain 60 tablet 0    traZODone (DESYREL) 50 MG tablet 1 to 1 1/2  pill 1 hour before bedtime for sleep. 135 tablet 1     No current facility-administered medications for this visit. Social History     Socioeconomic History    Marital status:       Spouse name: Not on file    Number of children: Not on file    Years of education: Not on file    Highest education level: Not on file   Occupational History    Not on file   Social Needs    Financial resource strain: Not on file    Food insecurity     Worry: Not on file     Inability: Not on file    Transportation needs     Medical: Not on file     Non-medical: Not on file   Tobacco Use    Smoking status: Former Smoker     Packs/day: 0.50     Years: 6.00     Pack years: 3.00     Types: Cigarettes     Start date: 1/1/1966     Last attempt to quit: 1/1/1972 Years since quittin.9    Smokeless tobacco: Never Used   Substance and Sexual Activity    Alcohol use: Yes     Comment: 2 a month    Drug use: No    Sexual activity: Not on file   Lifestyle    Physical activity     Days per week: Not on file     Minutes per session: Not on file    Stress: Not on file   Relationships    Social connections     Talks on phone: Not on file     Gets together: Not on file     Attends Sabianism service: Not on file     Active member of club or organization: Not on file     Attends meetings of clubs or organizations: Not on file     Relationship status: Not on file    Intimate partner violence     Fear of current or ex partner: Not on file     Emotionally abused: Not on file     Physically abused: Not on file     Forced sexual activity: Not on file   Other Topics Concern    Not on file   Social History Narrative    Not on file       Family History   Problem Relation Age of Onset    Heart Disease Mother     High Blood Pressure Mother     High Cholesterol Mother     Substance Abuse Mother         Tob. use    Cancer Mother         basal cell    Emphysema Mother     Heart Failure Mother     Hypertension Mother     Diabetes Father     Alcohol Abuse Father     Arrhythmia Father     Hypertension Father     Cancer Father         bladder    Asthma Sister     Cancer Maternal Aunt     Heart Failure Maternal Grandmother     Emphysema Maternal Grandfather        Past Medical History:   Diagnosis Date    Anemia     Anxiety     Asthma     PFT's    Back strain     Bell's palsy 2018    Rt Side    Chest pain 2016    GXT: Normal.    Chronic back pain     Cystitis     Depression     Dermatitis     Fibroids     GERD (gastroesophageal reflux disease)     Hematuria '00    Cystoscopy & IVP  (---)    History of recurrent UTIs     Hyperlipidemia     Hypothyroidism     Insomnia     Irritable bowel syndrome     Palpitations     Pneumonia  Review of Systems   Constitutional: Negative for fever. HENT: Negative for congestion. Eyes: Negative for discharge. Respiratory: Negative for cough, shortness of breath and wheezing. Cardiovascular: Negative for chest pain, palpitations and leg swelling. Gastrointestinal: Negative for abdominal pain, blood in stool, constipation, diarrhea, nausea and vomiting. Genitourinary: Negative for dysuria and hematuria. Musculoskeletal: Negative for myalgias. Skin: Negative for rash. Neurological: Negative for dizziness. Psychiatric/Behavioral: The patient is not nervous/anxious. Physical Exam  Constitutional:       General: She is not in acute distress. Appearance: She is not diaphoretic. HENT:      Right Ear: External ear normal.      Left Ear: External ear normal.      Mouth/Throat:      Pharynx: No oropharyngeal exudate. Eyes:      General: No scleral icterus. Right eye: No discharge. Left eye: No discharge. Neck:      Musculoskeletal: Normal range of motion. Thyroid: No thyromegaly. Cardiovascular:      Rate and Rhythm: Normal rate and regular rhythm. Heart sounds: Normal heart sounds. No murmur. No friction rub. No gallop. Pulmonary:      Effort: Pulmonary effort is normal.      Breath sounds: Normal breath sounds. No wheezing. Abdominal:      General: Bowel sounds are normal. There is no distension. Palpations: Abdomen is soft. Tenderness: There is no abdominal tenderness. Musculoskeletal: Normal range of motion. General: No tenderness. Lymphadenopathy:      Cervical: No cervical adenopathy. Skin:     General: Skin is warm and dry. Findings: No erythema or rash. Neurological:      Mental Status: She is alert and oriented to person, place, and time. Psychiatric:         Judgment: Judgment normal.       1. Hospital discharge follow-up  Reviewed treatment course at hospital    2.  Dog bite, subsequent

## 2020-10-22 NOTE — PATIENT INSTRUCTIONS
Call office if there is red streaking that develops or any purulent drainage that occurs  Continue augmentin twice a day as prescribed  Continue to wash wound with soap and water and apply bactroban

## 2020-11-20 ENCOUNTER — HOSPITAL ENCOUNTER (OUTPATIENT)
Age: 75
Discharge: HOME | End: 2020-11-20
Payer: MEDICARE

## 2020-11-20 LAB
A/G RATIO: 1.4 (ref 1.1–2.2)
ALBUMIN SERPL-MCNC: 4.2 G/DL (ref 3.4–5)
ALP BLD-CCNC: 395 U/L (ref 40–129)
ALT SERPL-CCNC: 141 U/L (ref 10–40)
ANION GAP SERPL CALCULATED.3IONS-SCNC: 10 MMOL/L (ref 3–16)
AST SERPL-CCNC: 107 U/L (ref 15–37)
BILIRUB SERPL-MCNC: 0.6 MG/DL (ref 0–1)
BUN BLDV-MCNC: 14 MG/DL (ref 7–20)
CALCIUM SERPL-MCNC: 9.6 MG/DL (ref 8.3–10.6)
CHLORIDE BLD-SCNC: 100 MMOL/L (ref 99–110)
CHOLESTEROL, TOTAL: 234 MG/DL (ref 0–199)
CO2: 27 MMOL/L (ref 21–32)
CREAT SERPL-MCNC: 0.7 MG/DL (ref 0.6–1.2)
GFR AFRICAN AMERICAN: >60
GFR NON-AFRICAN AMERICAN: >60
GLOBULIN: 3.1 G/DL
GLUCOSE BLD-MCNC: 82 MG/DL (ref 70–99)
HDLC SERPL-MCNC: 140 MG/DL (ref 40–60)
LDL CHOLESTEROL CALCULATED: 86 MG/DL
POTASSIUM SERPL-SCNC: 4.9 MMOL/L (ref 3.5–5.1)
SODIUM BLD-SCNC: 137 MMOL/L (ref 136–145)
T4 FREE: 1.1 NG/DL (ref 0.9–1.8)
T4 TOTAL: 6.1 UG/DL (ref 4.5–10.9)
TOTAL PROTEIN: 7.3 G/DL (ref 6.4–8.2)
TRIGL SERPL-MCNC: 39 MG/DL (ref 0–150)
TSH REFLEX: 4.8 UIU/ML (ref 0.27–4.2)
VLDLC SERPL CALC-MCNC: 8 MG/DL

## 2020-11-20 PROCEDURE — 80053 COMPREHEN METABOLIC PANEL: CPT

## 2020-11-20 PROCEDURE — 36415 COLL VENOUS BLD VENIPUNCTURE: CPT

## 2020-11-20 PROCEDURE — 84443 ASSAY THYROID STIM HORMONE: CPT

## 2020-11-20 PROCEDURE — 84439 ASSAY OF FREE THYROXINE: CPT

## 2020-11-20 PROCEDURE — 80061 LIPID PANEL: CPT

## 2020-11-30 ENCOUNTER — OFFICE VISIT (OUTPATIENT)
Dept: INTERNAL MEDICINE CLINIC | Age: 75
End: 2020-11-30
Payer: MEDICARE

## 2020-11-30 VITALS
SYSTOLIC BLOOD PRESSURE: 118 MMHG | DIASTOLIC BLOOD PRESSURE: 70 MMHG | HEIGHT: 68 IN | WEIGHT: 172.8 LBS | BODY MASS INDEX: 26.19 KG/M2 | TEMPERATURE: 97.3 F | OXYGEN SATURATION: 98 % | HEART RATE: 75 BPM

## 2020-11-30 PROCEDURE — G8484 FLU IMMUNIZE NO ADMIN: HCPCS | Performed by: INTERNAL MEDICINE

## 2020-11-30 PROCEDURE — 1090F PRES/ABSN URINE INCON ASSESS: CPT | Performed by: INTERNAL MEDICINE

## 2020-11-30 PROCEDURE — 1036F TOBACCO NON-USER: CPT | Performed by: INTERNAL MEDICINE

## 2020-11-30 PROCEDURE — 99214 OFFICE O/P EST MOD 30 MIN: CPT | Performed by: INTERNAL MEDICINE

## 2020-11-30 PROCEDURE — 1123F ACP DISCUSS/DSCN MKR DOCD: CPT | Performed by: INTERNAL MEDICINE

## 2020-11-30 PROCEDURE — 3017F COLORECTAL CA SCREEN DOC REV: CPT | Performed by: INTERNAL MEDICINE

## 2020-11-30 PROCEDURE — G8427 DOCREV CUR MEDS BY ELIG CLIN: HCPCS | Performed by: INTERNAL MEDICINE

## 2020-11-30 PROCEDURE — 4040F PNEUMOC VAC/ADMIN/RCVD: CPT | Performed by: INTERNAL MEDICINE

## 2020-11-30 PROCEDURE — G8399 PT W/DXA RESULTS DOCUMENT: HCPCS | Performed by: INTERNAL MEDICINE

## 2020-11-30 PROCEDURE — G8417 CALC BMI ABV UP PARAM F/U: HCPCS | Performed by: INTERNAL MEDICINE

## 2020-11-30 RX ORDER — CALCIUM CARBONATE 500(1250)
500 TABLET ORAL DAILY
COMMUNITY

## 2020-11-30 ASSESSMENT — PATIENT HEALTH QUESTIONNAIRE - PHQ9
SUM OF ALL RESPONSES TO PHQ QUESTIONS 1-9: 0
1. LITTLE INTEREST OR PLEASURE IN DOING THINGS: 0
2. FEELING DOWN, DEPRESSED OR HOPELESS: 0
SUM OF ALL RESPONSES TO PHQ9 QUESTIONS 1 & 2: 0

## 2020-12-02 NOTE — PROGRESS NOTES
insomnia still a problem. Intolerance to trazodone. Does not feel Zoloft is working. CV: negative   CNS: negative   :Negative   S/E:Negative  Renal: Negative      Prior to Visit Medications    Medication Sig Taking? Authorizing Provider   calcium carbonate (OSCAL) 500 MG TABS tablet Take 500 mg by mouth daily Yes Historical Provider, MD   levothyroxine (SYNTHROID) 50 MCG tablet TAKE 1 TABLET EVERY DAY (THYROID MEDICINE) Yes Tramaine Linda MD   sertraline (ZOLOFT) 100 MG tablet Take 1.5 tablets by mouth daily Depression medicine. Yes Tramaine Linda MD        Social History     Tobacco Use    Smoking status: Former Smoker     Packs/day: 0.50     Years: 6.00     Pack years: 3.00     Types: Cigarettes     Start date: 1966     Last attempt to quit: 1972     Years since quittin.9    Smokeless tobacco: Never Used   Substance Use Topics    Alcohol use: Yes     Comment: 2 a month        Vitals:    20 0754   BP: 118/70   Site: Right Upper Arm   Position: Sitting   Pulse: 75   Temp: 97.3 °F (36.3 °C)   TempSrc: Infrared   SpO2: 98%  Comment: RA at rest   Weight: 172 lb 12.8 oz (78.4 kg)   Height: 5' 7.5\" (1.715 m)     Estimated body mass index is 26.66 kg/m² as calculated from the following:    Height as of this encounter: 5' 7.5\" (1.715 m). Weight as of this encounter: 172 lb 12.8 oz (78.4 kg). Physical Exam  Head/neck: Ears: Normal TM. No obstruction. Throat: Mask. Not examined    Neck: No lymphadenopathy. Thyroid not palpable eyes: EOMI, PERRLA with no nystagmus  Lungs: Clear to auscultation. CV: S1-S2 normal.  SANDOVAL murmur. Carotid: No bruit. Abdominal Examination: Bowel sounds present. Soft nontender. No mass no guarding or   rebound. Spine/extremities: No edema. No tenderness to palpation. Skin: No rash  CNS: Patient is alert, cooperative, moves all 4 limbs, ambulates without difficulty, light touch normal.  Deep tendon reflexes normal.  Good orientation.   Blood pressure 118/70, pulse 75, temperature 97.3 °F (36.3 °C), temperature source Infrared, height 5' 7.5\" (1.715 m), weight 172 lb 12.8 oz (78.4 kg), SpO2 98 %, not currently breastfeeding. ASSESSMENT/PLAN:  1. Major depressive disorder in partial remission, unspecified whether recurrent (HCC)  We will taper off and discontinue Zoloft at patient's request.    2. Hypothyroidism, unspecified type  Continue to monitor    3. Elevated lipids  Because of elevated LFTs will stop Zocor and repeat chemistry panel call me for results    4. Mild intermittent asthma without complication  Stable at this time. 5. Irritable bowel syndrome, unspecified type  Likely related to constipation problems. Stop use of artificial sweeteners and dairy products    6. Insomnia, unspecified type  Has been off trazodone. I suggested nortriptyline but she declined. Can try over-the-counter preparations    7. Elevated LFTs  Stop Zocor and repeat labs before office visit  - Comprehensive Metabolic Panel; Future    8. Constipation, unspecified constipation type  Discussed high-fiber diet, high-fiber supplement and use of MiraLAX Monday Wednesday Friday if needed  Stop use of artificial sweeteners and dairy products    9. Abdominal bloating  Stop use of artificial sweeteners and dairy products. Follow-up with GYN physician. [facility]    Return follow-up  Dr. Bogdan Escoto    Return 40 min, for LIPIDS  LFT IBS. An electronic signature was used to authenticate this note.     --Milbert Bernheim, MD on 12/2/2020 at 2:33 PM

## 2021-01-29 ENCOUNTER — IMMUNIZATION (OUTPATIENT)
Dept: PRIMARY CARE CLINIC | Age: 76
End: 2021-01-29
Payer: MEDICARE

## 2021-01-29 PROCEDURE — 0011A COVID-19, MODERNA VACCINE 100MCG/0.5ML DOSE: CPT | Performed by: FAMILY MEDICINE

## 2021-01-29 PROCEDURE — 91301 COVID-19, MODERNA VACCINE 100MCG/0.5ML DOSE: CPT | Performed by: FAMILY MEDICINE

## 2021-02-08 ENCOUNTER — NURSE TRIAGE (OUTPATIENT)
Dept: OTHER | Facility: CLINIC | Age: 76
End: 2021-02-08

## 2021-02-08 ENCOUNTER — OFFICE VISIT (OUTPATIENT)
Dept: INTERNAL MEDICINE CLINIC | Age: 76
End: 2021-02-08
Payer: MEDICARE

## 2021-02-08 VITALS
HEIGHT: 68 IN | DIASTOLIC BLOOD PRESSURE: 82 MMHG | TEMPERATURE: 98.1 F | HEART RATE: 72 BPM | OXYGEN SATURATION: 98 % | BODY MASS INDEX: 26.83 KG/M2 | SYSTOLIC BLOOD PRESSURE: 140 MMHG | WEIGHT: 177 LBS

## 2021-02-08 DIAGNOSIS — T50.Z95A ADVERSE EFFECT OF VACCINE, INITIAL ENCOUNTER: Primary | ICD-10-CM

## 2021-02-08 PROCEDURE — 1123F ACP DISCUSS/DSCN MKR DOCD: CPT | Performed by: NURSE PRACTITIONER

## 2021-02-08 PROCEDURE — 1036F TOBACCO NON-USER: CPT | Performed by: NURSE PRACTITIONER

## 2021-02-08 PROCEDURE — G8417 CALC BMI ABV UP PARAM F/U: HCPCS | Performed by: NURSE PRACTITIONER

## 2021-02-08 PROCEDURE — G8427 DOCREV CUR MEDS BY ELIG CLIN: HCPCS | Performed by: NURSE PRACTITIONER

## 2021-02-08 PROCEDURE — 99213 OFFICE O/P EST LOW 20 MIN: CPT | Performed by: NURSE PRACTITIONER

## 2021-02-08 PROCEDURE — G8399 PT W/DXA RESULTS DOCUMENT: HCPCS | Performed by: NURSE PRACTITIONER

## 2021-02-08 PROCEDURE — G8484 FLU IMMUNIZE NO ADMIN: HCPCS | Performed by: NURSE PRACTITIONER

## 2021-02-08 PROCEDURE — 1090F PRES/ABSN URINE INCON ASSESS: CPT | Performed by: NURSE PRACTITIONER

## 2021-02-08 PROCEDURE — 4040F PNEUMOC VAC/ADMIN/RCVD: CPT | Performed by: NURSE PRACTITIONER

## 2021-02-08 PROCEDURE — 3017F COLORECTAL CA SCREEN DOC REV: CPT | Performed by: NURSE PRACTITIONER

## 2021-02-08 ASSESSMENT — ENCOUNTER SYMPTOMS
CHEST TIGHTNESS: 0
SHORTNESS OF BREATH: 0
VOMITING: 0
COLOR CHANGE: 1
ABDOMINAL DISTENTION: 0
DIARRHEA: 0
NAUSEA: 0
CONSTIPATION: 0

## 2021-02-08 NOTE — PROGRESS NOTES
[facility]  [address], [address] [place] [address], [place] [address]  Tel:[phone]    Suha Regalado is a 70 y.o. female who presents today for her medical conditions/complaints as noted below. Suha Regalado is c/o of Allergic Reaction (Covid vaccine on right arm. Red and itchy.  )      Chief Complaint   Patient presents with    Allergic Reaction     Covid vaccine on right arm. Red and itchy. HPI:     Rash: Suha Regalado is a 70 y.o. female who presents with a 2 day history of a localized rash which started roughly a week after receiving her COVID vaccine in the same arm. Rash first presented on the right arm and has not spread. Rash is red and is pruritic, raised and red. Associated symptoms include slight soreness. Patient has tried systemic antihistamine - zyrtec. New exposures: none. Patient has not had contacts with similar symptoms. Prior history of similar symptoms: no.        Past Medical History:   Diagnosis Date    Anemia     Anxiety     Asthma '96    PFT's    Back strain     Bell's palsy 04/2018    Rt Side    Chest pain 08/05/2016    GXT: Normal.    Chronic back pain     Cystitis     Depression     Dermatitis     Fibroids     GERD (gastroesophageal reflux disease)     Hematuria '00    Cystoscopy & IVP  (---)    History of recurrent UTIs     Hyperlipidemia     Hypothyroidism 06/05    Insomnia     Irritable bowel syndrome     Palpitations     Pneumonia 11/93        Past Surgical History:   Procedure Laterality Date    CHOLECYSTECTOMY      COLONOSCOPY  01/04    Neg.     COLONOSCOPY  10/1/14    Tubular Adenoma    COLONOSCOPY  11/07/2017     Hyperplastic Polyps ×2: Redo 5 years    CYSTOSCOPY      HYSTERECTOMY, TOTAL ABDOMINAL      TONSILLECTOMY AND ADENOIDECTOMY      TUBAL LIGATION         Family History   Problem Relation Age of Onset    Heart Disease Mother     High Blood Pressure Mother     High Cholesterol Mother     Substance Abuse Mother         Tob. use    Cancer Mother         basal cell    Emphysema Mother     Heart Failure Mother     Hypertension Mother     Diabetes Father     Alcohol Abuse Father     Arrhythmia Father     Hypertension Father     Cancer Father         bladder    Asthma Sister     Cancer Maternal Aunt     Heart Failure Maternal Grandmother     Emphysema Maternal Grandfather        Social History     Tobacco Use    Smoking status: Former Smoker     Packs/day: 0.50     Years: 6.00     Pack years: 3.00     Types: Cigarettes     Start date: 1966     Quit date: 1972     Years since quittin.1    Smokeless tobacco: Never Used   Substance Use Topics    Alcohol use: Yes     Comment: 2 a month        Current Outpatient Medications   Medication Sig Dispense Refill    calcium carbonate (OSCAL) 500 MG TABS tablet Take 500 mg by mouth daily      levothyroxine (SYNTHROID) 50 MCG tablet TAKE 1 TABLET EVERY DAY (THYROID MEDICINE) 90 tablet 1    sertraline (ZOLOFT) 100 MG tablet Take 1.5 tablets by mouth daily Depression medicine. (Patient not taking: Reported on 2021) 135 tablet 1     No current facility-administered medications for this visit. Allergies   Allergen Reactions    Macrobid [Nitrofurantoin Monohyd Macro] Rash    Sulfa Antibiotics Nausea Only and Other (See Comments)     Stomach nausea       Subjective:      Review of Systems   Constitutional: Negative for activity change, fatigue and unexpected weight change. Respiratory: Negative for chest tightness and shortness of breath. Cardiovascular: Negative for chest pain, palpitations and leg swelling. Gastrointestinal: Negative for abdominal distention, constipation, diarrhea, nausea and vomiting. Genitourinary: Negative for difficulty urinating and urgency. Skin: Positive for color change and rash. Reaction to COVID vaccination: round, slightly raised, itchy rash over injection site. Neurological: Negative for dizziness, weakness and light-headedness. Objective:     Vitals:    02/08/21 1342   BP: (!) 140/82   Site: Left Upper Arm   Position: Sitting   Cuff Size: Medium Adult   Pulse: 72   Temp: 98.1 °F (36.7 °C)   TempSrc: Infrared   SpO2: 98%   Weight: 177 lb (80.3 kg)   Height: 5' 7.5\" (1.715 m)       Physical Exam  Musculoskeletal:        Arms:          Assessment & Plan: The following diagnoses and conditions are stable with no further orders unless indicated:    1. Adverse effect of vaccine, initial encounter        Janina Gregory was seen today for allergic reaction. Diagnoses and all orders for this visit:    Adverse effect of vaccine, initial encounter    Rash consistent to reaction with COVID vaccine. Recommend benadryl BID PRN for itch/swelling. Can use ice packs on/off to relieve swelling as well. VAERS report completed. Return if symptoms worsen or fail to improve. Total time spent reviewing patient chart, vitals, assessment, documentation: 20 min    Patientshould call the office immediately with new or ongoing signs or symptoms or worsening, or proceed to the emergency room. If you are on medications which could impair your senses, you are at risk of weakness, falls,dizziness, or drowsiness. You should be careful during activities which could place you at risk of harm, such as climbing, using stairs, operating machinery, or driving vehicles. If you feel you cannot safely do theseactivities, you should request others to help you, or avoid the activities altogether. If you are drowsy for any other reason, you should use the same precautions as listed above. Call if pattern of symptoms change or persists for an extended time.       Audelia Ambrocio

## 2021-02-08 NOTE — TELEPHONE ENCOUNTER
Reason for Disposition   [1] Redness or red streak around the injection site AND [2] started > 48 hours after getting vaccine AND [3] no fever  (Exception: red area < 1 inch or 2.5 cm wide)    Answer Assessment - Initial Assessment Questions  1. MAIN CONCERN OR SYMPTOM:  \"What is your main concern right now? \" \"What question do you have? \" \"What's the main symptom you're worried about? \" (e.g., fever, pain, redness, swelling)      Itching and redness at vaccination site. 2. VACCINE: \"What vaccination did you receive? \" \"Is this your first or second shot? \" (e.g., none; Scherry Sers, other)      [address] first shot 1/29th     3. SYMPTOM ONSET: \"When did the itching begin? \" (e.g., not relevant; hours, days)       Saturday A week and a day after vaccine. Noticed at that time a silver dollar area that is red around the vaccination site. Warm to touch. 4. SYMPTOM SEVERITY: \"How bad is it? \"       Itching a little, redness is almost gone    5. FEVER: \"Is there a fever? \" If so, ask: \"What is it, how was it measured, and when did it start? \"       No fever    6. PAST REACTIONS: \"Have you reacted to immunizations before? \" If so, ask: \"What happened? \"      Might have had a little itching after flu shot one year, but not certain    7. OTHER SYMPTOMS: \"Do you have any other symptoms? \"      No    Protocols used: CORONAVIRUS (COVID-19) VACCINE QUESTIONS AND REACTIONS-ADULT-AH    Call received on [address]. Brief description of triage: Received Moderna vaccine on 1/29. Started having itching and redness at site 1 1/2 weeks later. Triage indicates for patient to f/u with PCP w/i 24 hours. Care advice provided. Recommended using local department of health website for testing locations and up to date information. Caller verbalizes understanding, denies any other questions or concerns; instructed to call back for any new or worsening symptoms.

## 2021-02-26 ENCOUNTER — IMMUNIZATION (OUTPATIENT)
Dept: PRIMARY CARE CLINIC | Age: 76
End: 2021-02-26
Payer: MEDICARE

## 2021-02-26 PROCEDURE — 91301 COVID-19, MODERNA VACCINE 100MCG/0.5ML DOSE: CPT | Performed by: FAMILY MEDICINE

## 2021-02-26 PROCEDURE — 0012A PR IMM ADMN SARSCOV2 100 MCG/0.5 ML 2ND DOSE: CPT | Performed by: FAMILY MEDICINE

## 2021-03-04 ENCOUNTER — OFFICE VISIT (OUTPATIENT)
Dept: INTERNAL MEDICINE CLINIC | Age: 76
End: 2021-03-04
Payer: MEDICARE

## 2021-03-04 VITALS
OXYGEN SATURATION: 97 % | TEMPERATURE: 97.1 F | HEART RATE: 73 BPM | SYSTOLIC BLOOD PRESSURE: 124 MMHG | WEIGHT: 177 LBS | HEIGHT: 67 IN | DIASTOLIC BLOOD PRESSURE: 76 MMHG | BODY MASS INDEX: 27.78 KG/M2

## 2021-03-04 DIAGNOSIS — R79.89 ELEVATED LFTS: ICD-10-CM

## 2021-03-04 DIAGNOSIS — K59.00 CONSTIPATION, UNSPECIFIED CONSTIPATION TYPE: ICD-10-CM

## 2021-03-04 DIAGNOSIS — E78.5 ELEVATED LIPIDS: ICD-10-CM

## 2021-03-04 DIAGNOSIS — K58.9 IRRITABLE BOWEL SYNDROME, UNSPECIFIED TYPE: ICD-10-CM

## 2021-03-04 DIAGNOSIS — E03.9 HYPOTHYROIDISM, UNSPECIFIED TYPE: ICD-10-CM

## 2021-03-04 DIAGNOSIS — F32.4 MAJOR DEPRESSIVE DISORDER IN PARTIAL REMISSION, UNSPECIFIED WHETHER RECURRENT (HCC): Primary | ICD-10-CM

## 2021-03-04 PROCEDURE — 4040F PNEUMOC VAC/ADMIN/RCVD: CPT | Performed by: INTERNAL MEDICINE

## 2021-03-04 PROCEDURE — 99214 OFFICE O/P EST MOD 30 MIN: CPT | Performed by: INTERNAL MEDICINE

## 2021-03-04 PROCEDURE — 1123F ACP DISCUSS/DSCN MKR DOCD: CPT | Performed by: INTERNAL MEDICINE

## 2021-03-04 PROCEDURE — G8417 CALC BMI ABV UP PARAM F/U: HCPCS | Performed by: INTERNAL MEDICINE

## 2021-03-04 PROCEDURE — G8484 FLU IMMUNIZE NO ADMIN: HCPCS | Performed by: INTERNAL MEDICINE

## 2021-03-04 PROCEDURE — 1036F TOBACCO NON-USER: CPT | Performed by: INTERNAL MEDICINE

## 2021-03-04 PROCEDURE — G8399 PT W/DXA RESULTS DOCUMENT: HCPCS | Performed by: INTERNAL MEDICINE

## 2021-03-04 PROCEDURE — 1090F PRES/ABSN URINE INCON ASSESS: CPT | Performed by: INTERNAL MEDICINE

## 2021-03-04 PROCEDURE — G8427 DOCREV CUR MEDS BY ELIG CLIN: HCPCS | Performed by: INTERNAL MEDICINE

## 2021-03-04 PROCEDURE — 3017F COLORECTAL CA SCREEN DOC REV: CPT | Performed by: INTERNAL MEDICINE

## 2021-03-04 RX ORDER — ATORVASTATIN CALCIUM 10 MG/1
10 TABLET, FILM COATED ORAL DAILY
Qty: 90 TABLET | Refills: 1 | Status: SHIPPED | OUTPATIENT
Start: 2021-03-04 | End: 2021-07-19

## 2021-03-04 SDOH — ECONOMIC STABILITY: INCOME INSECURITY: HOW HARD IS IT FOR YOU TO PAY FOR THE VERY BASICS LIKE FOOD, HOUSING, MEDICAL CARE, AND HEATING?: NOT HARD AT ALL

## 2021-03-04 SDOH — ECONOMIC STABILITY: FOOD INSECURITY: WITHIN THE PAST 12 MONTHS, THE FOOD YOU BOUGHT JUST DIDN'T LAST AND YOU DIDN'T HAVE MONEY TO GET MORE.: NEVER TRUE

## 2021-03-04 SDOH — ECONOMIC STABILITY: TRANSPORTATION INSECURITY
IN THE PAST 12 MONTHS, HAS THE LACK OF TRANSPORTATION KEPT YOU FROM MEDICAL APPOINTMENTS OR FROM GETTING MEDICATIONS?: NOT ASKED

## 2021-03-05 NOTE — PROGRESS NOTES
Daren Raza (:  1945) is a 70 y.o. female,Established patient, here for evaluation of the following chief complaint(s): Other ([facility] 75. Gaps), Hypothyroidism, and Depression      ASSESSMENT/PLAN:  1. Major depressive disorder in partial remission, unspecified whether recurrent ([facility] 75.)             Patient stable off Zoloft. Does not wish any further treatment. 2. Hypothyroidism, unspecified type  -   Need to obtain thyroid function test.  TSH with Reflex; Future    3. Constipation, unspecified constipation type                Discussed continuing with high-fiber supplement but also adding MiraLAX . Colonoscopy was reviewed    4. Irritable bowel syndrome, unspecified type              Treating constipation as above. 5. Elevated LFTs              Vast improvement off Zocor. 6. Elevated lipids  -   Improvement in LFT elevation off Zocor. Will now try Lipitor  Atorvastatin (LIPITOR) 10 MG tablet; Take 1 tablet by mouth daily For high cholesterol, Disp-90 tablet, R-1Normal  -     Lipid Panel; Future  -     Comprehensive Metabolic Panel; Future      Return in about 6 months (around 2021) for LIPIDS   IBS. SUBJECTIVE/OBJECTIVE:  HPI    Patient with depression, hypothyroid, anxiety, insomnia, asthma, GERD, IBS, Bell's palsy, hyperlipidemia. Last office visit with me: 2020: Reviewed 2019 death of her . Difficulty with constipation. Discussed avoiding artificial sweeteners, yogurt,. Did review colonoscopy 2017.  2 polyps. Redo in 5 years. Elevated LFTs. Stop Zocor. Repeat laboratory studies: 2021: Chemistry panel: Much improved with only alkaline phosphatase slightly elevated 118 was 395 . ALT now 17 normal was 141. AST: 22 now normal was 107. Have not restarted statin. Mammogram: 2021: Normal.  Concerning anxiety and depression she is off Zoloft. Declines any new medicines. Feels like she is doing okay without it.   Concerning constipation uses 3 fiber caps a day but still has difficulty with constipation. Review of Systems    Review of Systems   Constitutional: negative   HENT: negative   EYES: negative   Respiratory: negative   Gastrointestinal: Constipation as above. .  Liver function test all vastly improved off Zocor  Endocrine: negative   Musculoskeletal: negative   Skin: negative   Allergic/Immunological: negative   Hematological: negative   Psychiatric/Behavorial: Doing fairly well concerning anxiety and depression off Zoloft. CV: negative   CNS: negative   :Negative   S/E:Negative  Renal: Negative      Physical Exam    Head/neck: Ears: Normal TM. No obstruction. Throat: Mask. Not examined. Neck: No lymphadenopathy. Eyes: EOMI, PERRLA with no nystagmus  Lungs: Clear to auscultation. CV: S1-S2 normal.  SANDOVAL murmur. Carotid: No bruit. Abdominal Examination: Bowel sounds present. Soft nontender. No mass no guarding or   rebound. Spine/extremities: Mild anterior tibial and ankle bilateral edema. No tenderness to palpation. Skin: No rash  CNS: Patient is alert, cooperative, moves all 4 limbs, ambulates without difficulty, light touch normal.    Good orientation. Blood pressure 124/76, pulse 73, temperature 97.1 °F (36.2 °C), temperature source Infrared, height 5' 7\" (1.702 m), weight 177 lb (80.3 kg), SpO2 97 %, not currently breastfeeding. An electronic signature was used to authenticate this note.     --Darin Flynn MD

## 2021-06-30 ENCOUNTER — TELEPHONE (OUTPATIENT)
Dept: INTERNAL MEDICINE CLINIC | Age: 76
End: 2021-06-30

## 2021-06-30 ENCOUNTER — HOSPITAL ENCOUNTER (OUTPATIENT)
Age: 76
Discharge: HOME | End: 2021-06-30
Payer: MEDICARE

## 2021-06-30 DIAGNOSIS — R73.01 IFG (IMPAIRED FASTING GLUCOSE): ICD-10-CM

## 2021-06-30 DIAGNOSIS — Z13.1 DIABETES MELLITUS SCREENING: ICD-10-CM

## 2021-06-30 DIAGNOSIS — E78.5 DYSLIPIDEMIA: Primary | ICD-10-CM

## 2021-06-30 LAB
A/G RATIO: 1.5 (ref 1.1–2.2)
ALBUMIN SERPL-MCNC: 4.5 G/DL (ref 3.4–5)
ALP BLD-CCNC: 101 U/L (ref 40–129)
ALT SERPL-CCNC: 17 U/L (ref 10–40)
ANION GAP SERPL CALCULATED.3IONS-SCNC: 12 MMOL/L (ref 3–16)
AST SERPL-CCNC: 26 U/L (ref 15–37)
BILIRUB SERPL-MCNC: <0.2 MG/DL (ref 0–1)
BUN BLDV-MCNC: 14 MG/DL (ref 7–20)
CALCIUM SERPL-MCNC: 9.7 MG/DL (ref 8.3–10.6)
CHLORIDE BLD-SCNC: 100 MMOL/L (ref 99–110)
CO2: 25 MMOL/L (ref 21–32)
CREAT SERPL-MCNC: 0.8 MG/DL (ref 0.6–1.2)
GFR AFRICAN AMERICAN: >60
GFR NON-AFRICAN AMERICAN: >60
GLOBULIN: 3.1 G/DL
GLUCOSE BLD-MCNC: 94 MG/DL (ref 70–99)
POTASSIUM SERPL-SCNC: 4.6 MMOL/L (ref 3.5–5.1)
SODIUM BLD-SCNC: 137 MMOL/L (ref 136–145)
TOTAL PROTEIN: 7.6 G/DL (ref 6.4–8.2)

## 2021-06-30 PROCEDURE — 36415 COLL VENOUS BLD VENIPUNCTURE: CPT

## 2021-06-30 PROCEDURE — 80053 COMPREHEN METABOLIC PANEL: CPT

## 2021-06-30 PROCEDURE — 83036 HEMOGLOBIN GLYCOSYLATED A1C: CPT

## 2021-06-30 NOTE — TELEPHONE ENCOUNTER
----- Message from Bob Gonzalez sent at 6/30/2021  2:45 PM EDT -----  Subject: Referral Request    QUESTIONS   Reason for referral request? patient called in stating she is waking up   every hour and dying of thirst. She keeps water by the bed and its gone by   morning. She would like to be tested for Diabetes. Has the physician seen you for this condition before? No   Preferred Specialist (if applicable)? Do you already have an appointment scheduled? No  Additional Information for Provider?   ---------------------------------------------------------------------------  --------------  CALL BACK INFO  What is the best way for the office to contact you? OK to leave message on   voicemail  Preferred Call Back Phone Number?  [phone]

## 2021-07-01 ENCOUNTER — OFFICE VISIT (OUTPATIENT)
Dept: INTERNAL MEDICINE CLINIC | Age: 76
End: 2021-07-01
Payer: MEDICARE

## 2021-07-01 VITALS
SYSTOLIC BLOOD PRESSURE: 120 MMHG | WEIGHT: 170 LBS | BODY MASS INDEX: 26.63 KG/M2 | HEART RATE: 63 BPM | TEMPERATURE: 97.2 F | OXYGEN SATURATION: 98 % | DIASTOLIC BLOOD PRESSURE: 70 MMHG

## 2021-07-01 DIAGNOSIS — R10.13 EPIGASTRIC PAIN: ICD-10-CM

## 2021-07-01 DIAGNOSIS — K59.00 CONSTIPATION, UNSPECIFIED CONSTIPATION TYPE: ICD-10-CM

## 2021-07-01 DIAGNOSIS — R06.09 DYSPNEA ON EXERTION: Primary | ICD-10-CM

## 2021-07-01 DIAGNOSIS — R06.83 SNORING: ICD-10-CM

## 2021-07-01 DIAGNOSIS — J45.20 MILD INTERMITTENT ASTHMA WITHOUT COMPLICATION: ICD-10-CM

## 2021-07-01 DIAGNOSIS — E03.9 ACQUIRED HYPOTHYROIDISM: ICD-10-CM

## 2021-07-01 DIAGNOSIS — R68.2 DRY MOUTH: ICD-10-CM

## 2021-07-01 DIAGNOSIS — G47.00 INSOMNIA, UNSPECIFIED TYPE: ICD-10-CM

## 2021-07-01 LAB
ESTIMATED AVERAGE GLUCOSE: 125.5 MG/DL
HBA1C MFR BLD: 6 %

## 2021-07-01 PROCEDURE — 1123F ACP DISCUSS/DSCN MKR DOCD: CPT | Performed by: INTERNAL MEDICINE

## 2021-07-01 PROCEDURE — G8417 CALC BMI ABV UP PARAM F/U: HCPCS | Performed by: INTERNAL MEDICINE

## 2021-07-01 PROCEDURE — G8399 PT W/DXA RESULTS DOCUMENT: HCPCS | Performed by: INTERNAL MEDICINE

## 2021-07-01 PROCEDURE — 99215 OFFICE O/P EST HI 40 MIN: CPT | Performed by: INTERNAL MEDICINE

## 2021-07-01 PROCEDURE — 4040F PNEUMOC VAC/ADMIN/RCVD: CPT | Performed by: INTERNAL MEDICINE

## 2021-07-01 PROCEDURE — 1090F PRES/ABSN URINE INCON ASSESS: CPT | Performed by: INTERNAL MEDICINE

## 2021-07-01 PROCEDURE — G8427 DOCREV CUR MEDS BY ELIG CLIN: HCPCS | Performed by: INTERNAL MEDICINE

## 2021-07-01 PROCEDURE — 3017F COLORECTAL CA SCREEN DOC REV: CPT | Performed by: INTERNAL MEDICINE

## 2021-07-01 PROCEDURE — 1036F TOBACCO NON-USER: CPT | Performed by: INTERNAL MEDICINE

## 2021-07-02 NOTE — PROGRESS NOTES
Mirna Khan 70 y.o. female presents today for an Established patient for   Chief Complaint   Patient presents with    Hyperlipidemia            ASSESSMENT/PLAN    1. Dyspnea on exertion          Complaint of dyspnea with steps carrying laundry. No chest pain. 8/2/2016 previous GXT normal but hypertensive response. Will evaluate further with:  - Stress test, myoview; Future. Call me next day for results    2. Epigastric pain            Status post cholecystectomy. May well GERD/gastritis in nature commode large caffeine consumption. Suggest trial of Prilosec in the morning before breakfast.  Taper or discontinue caffeine consumption. 3. Acquired hypothyroidism           Continue to monitor. 4. Dry mouth              Complaint of dry mouth may be secondary to chronic sinus congestion and snoring. We discussed use of over-the-counter saline nasal spray followed by steroid nasal spray. 5. Constipation, unspecified constipation type           Chronic problem. Certainly could be adding to her abdominal discomfort. Suggested Metamucil on a daily basis supplemented by [facility] Monday Wednesday Friday. Reviewed colonoscopy 11/2017 hyperplastic polyps redo in 5 years. 6. Snoring             Discussed use of saline nasal spray followed by steroid nasal spray and reevaluation. 7. Mild intermittent asthma without complication              Stable at this time. Continue present treatment    8. Insomnia, unspecified type              Should improve with treating dry mouth and snoring. Spent greater than 45 minutes with this patient face-to-face evaluating her serious medical problems including dyspnea, abdominal pain, constipation. Requiring cardiac testing, reviewing previous colonoscopy and laboratory data. Dr. Luis Mishra    Return in about 10 weeks (around 9/9/2021), or 40  min, for SOB   ABD Pain.      HPI: Patient with hypothyroid, depression, anxiety, insomnia, asthma, chronic rhinitis, GERD, IBS, snoring, hyperlipidemia. 1.  Patient states for the last few weeks a weeks every hour while sleeping because she is \"so thirsty\" and \"mouth so dry\" but she drinks water. She denies this to be a daytime problem. She wants to get checked out for diabetes. Upon review has been noted to be a snorer by her  who is passed away. Patient complains of chronic sinus congestion takes Zyrtec on a nightly basis. 2.  Patient with constipation has a bowel movement occurring approximately once every 4 to 7 days. Chronic problem. Reviewed colonoscopy 11/7/2017 hyperplastic polyps redo 5 years. 3.  Reviewed laboratory data 6/30/2021. Chemistry panel: Glucose 94 otherwise unremarkable. Hemoglobin A1c 6.0  4. She notes epigastric discomfort feels better after she belches. May last minutes. On and off problem over the last 3 to 4 weeks. Does not appear to be related to food intake. No treatment yet to date. Constipation and intestinal gas with occasional nausea noted. No vomiting. Upon review social history: Coffee to 12 cups every morning. Soda Coke zero half a can a day. Alcohol none on a regular basis. Tobacco none. Patient points to an area about the mid to upper back on the right which is a discomfort that seems to be positional in nature. Upon review she is status post cholecystectomy. Ibuprofen seem to help this discomfort. 5.  Dyspnea: Noted with steps and clearing her wondering. Denies chest pain. Upon review 8/2/2016 GXT was normal but hypertensive pressure response.       Results for orders placed or performed during the hospital encounter of 06/30/21   Comprehensive Metabolic Panel   Result Value Ref Range    Sodium 137 136 - 145 mmol/L    Potassium 4.6 3.5 - 5.1 mmol/L    Chloride 100 99 - 110 mmol/L    CO2 25 21 - 32 mmol/L    Anion Gap 12 3 - 16    Glucose 94 70 - 99 mg/dL    BUN 14 7 - 20 mg/dL    CREATININE 0.8 0.6 - 1.2 mg/dL    GFR Non-African American >60 >60    GFR  >60 >60    Calcium 9.7 8.3 - 10.6 mg/dL    Total Protein 7.6 6.4 - 8.2 g/dL    Albumin 4.5 3.4 - 5.0 g/dL    Albumin/Globulin Ratio 1.5 1.1 - 2.2    Total Bilirubin <0.2 0.0 - 1.0 mg/dL    Alkaline Phosphatase 101 40 - 129 U/L    ALT 17 10 - 40 U/L    AST 26 15 - 37 U/L    Globulin 3.1 g/dL   Hemoglobin A1C   Result Value Ref Range    Hemoglobin A1C 6.0 See comment %    eAG 125.5 mg/dL              ROS:  Review of Systems   Constitutional: negative   HENT: At night complains of dry mouth awakening to drink water. Snores. EYES: negative   Respiratory: negative   Gastrointestinal: Epigastric discomfort. Sometimes radiates through to the right mid back. Constipation, nausea, significant coffee/caffeine consumption  Endocrine: negative   Musculoskeletal: negative   Skin: negative   Allergic/Immunological: negative   Hematological: negative   Psychiatric/Behavorial: negative   CV: Dyspnea with exertion. Previous GXT 8/2016 normal but hypertensive response  CNS: negative   :Negative   S/E:Negative  Renal: Negative     Physical Exam:  Head/neck: Ears: Normal TM. No obstruction. Throat: Mask. Not examined. Neck: No lymphadenopathy. Eyes: EOMI, PERRLA with no nystagmus  Lungs: Clear to auscultation. CV: S1-S2 normal.  SANDOVAL murmur. Carotid: No bruit. Abdominal Examination: Mid upper epigastric area tender to palpation although no definite mass. Bowel sounds present. Soft  . no guarding or   rebound. Spine/extremities: Mid right paraspinal muscles tender to palpation seems to reproduce her discomfort. No definite mass no edema. No tenderness to palpation. Skin: No rash  CNS: Patient is alert, cooperative, moves all 4 limbs, ambulates without difficulty, light touch normal.   Good historian good orientation. Blood pressure 120/70, pulse 63, temperature 97.2 °F (36.2 °C), weight 170 lb (77.1 kg), SpO2 98 %, not currently breastfeeding.        Rommel Arizmendi MD

## 2021-07-06 ENCOUNTER — HOSPITAL ENCOUNTER (OUTPATIENT)
Dept: CARDIOLOGY | Age: 76
Discharge: HOME | End: 2021-07-06
Payer: MEDICARE

## 2021-07-06 DIAGNOSIS — R06.09 DYSPNEA ON EXERTION: ICD-10-CM

## 2021-07-06 LAB
LV EF: 60 %
LVEF MODALITY: NORMAL

## 2021-07-06 PROCEDURE — 93017 CV STRESS TEST TRACING ONLY: CPT

## 2021-07-06 PROCEDURE — 78452 HT MUSCLE IMAGE SPECT MULT: CPT

## 2021-07-06 PROCEDURE — 3430000000 HC RX DIAGNOSTIC RADIOPHARMACEUTICAL: Performed by: INTERNAL MEDICINE

## 2021-07-06 PROCEDURE — A9502 TC99M TETROFOSMIN: HCPCS | Performed by: INTERNAL MEDICINE

## 2021-07-06 RX ADMIN — TETROFOSMIN 33.4 MILLICURIE: 1.38 INJECTION, POWDER, LYOPHILIZED, FOR SOLUTION INTRAVENOUS at 09:10

## 2021-07-06 RX ADMIN — TETROFOSMIN 10.2 MILLICURIE: 1.38 INJECTION, POWDER, LYOPHILIZED, FOR SOLUTION INTRAVENOUS at 07:57

## 2021-07-09 ENCOUNTER — TELEPHONE (OUTPATIENT)
Dept: INTERNAL MEDICINE CLINIC | Age: 76
End: 2021-07-09

## 2021-07-09 NOTE — TELEPHONE ENCOUNTER
Please call patient.   From 7/6/2021: Nuclear medicine myocardial perfusion test normal.  Good news  Dr. Burgos Major

## 2021-07-18 DIAGNOSIS — E78.5 ELEVATED LIPIDS: ICD-10-CM

## 2021-07-19 RX ORDER — ATORVASTATIN CALCIUM 10 MG/1
TABLET, FILM COATED ORAL
Qty: 90 TABLET | Refills: 1 | Status: SHIPPED | OUTPATIENT
Start: 2021-07-19 | End: 2022-01-10

## 2021-07-19 RX ORDER — LEVOTHYROXINE SODIUM 0.05 MG/1
TABLET ORAL
Qty: 90 TABLET | Refills: 1 | Status: SHIPPED | OUTPATIENT
Start: 2021-07-19 | End: 2021-09-09 | Stop reason: SDUPTHER

## 2021-07-19 NOTE — TELEPHONE ENCOUNTER
Refill request for lipitor medication.      Name of Pharmacy- [facility]      Last visit - 7/1/21     Pending visit - 9/9/21    Last refill -3/4/21

## 2021-09-06 NOTE — PROGRESS NOTES
Pedro Helper 70 y.o. female presents today for an Established patient for   Chief Complaint   Patient presents with    Hyperlipidemia    Urinary Tract Infection     urinary frequency            ASSESSMENT/PLAN    1. Dyspnea on exertion             7/6/2021: Cardiac perfusion imaging normal.  She notes no further difficulty with PAREDES. 2. Epigastric pain            Of note GERD/gastritis with significant coffee consumption. Improved with Prilosec    3. Elevated lipids              Continue to monitor  - Lipid Panel; Future  - Comprehensive Metabolic Panel; Future    4. Constipation, unspecified constipation type               Increase MiraLAX every day. Continue with Metamucil every day. Avoid dairy products and artificial sweeteners    5. Mild intermittent asthma without complication                   Stable at this time. 6. Acquired hypothyroidism                                    Stable. Continue to monitor  - levothyroxine (SYNTHROID) 75 MCG tablet; TAKE 1 TABLET EVERY DAY (THYROID MEDICINE)  Dispense: 90 tablet; Refill: 1  - TSH with Reflex; Future    7. Depression, unspecified depression type                      Stable. Continue to monitor    8. Gastroesophageal reflux disease without esophagitis                   Decrease coffee consumption. Use of Prilosec    9. Dyslipidemia                 Continue to monitor    10. Dysuria                     Symptoms of frequency and urgency. Urinary dipstick positive for small amount of red blood cells. Empirically treated UTI with Cipro  - POCT Urinalysis no Micro  - ciprofloxacin (CIPRO) 500 MG tablet; Take one tablet now, then half a pill twice a day until finished. Dispense: 7 tablet; Refill: 0       Return in about 6 months (around 3/9/2022) for LIPIDS   Hypothy. HPI:  Patient here for follow-up of PAREDES and epigastric pain. Review last office visit with me: 7/1/2021.   Dyspnea on exertion work-up: 7/6/2021 cardiac perfusion imaging: Normal no myocardial ischemia or scar. Concerning epigastric pain of note status post cholecystectomy. Question of GERD/gastritis of note large coffee consumption. Taper coffee consumption also trial of Prilosec over-the-counter. Also of note difficulty with constipation may well be contributing to epigastric discomfort. We discussed Metamucil on a daily basis and adding MiraLAX Monday Wednesday and Friday if needed. Also reviewed colonoscopy: 11/2017 + for hyperplastic polyps redo in 5 years. History of asthma. Insomnia. Review laboratory data 6/30/2021: Chemistry panel was unremarkable. A1c 6.0. Health maintenance: Shingles, AWV  Patient complains of \"gas\" with flatus. History of constipation suggested using MiraLAX every day as well as Metamucil. Complaint of urinary urgency and frequency. History of nocturia. Results for orders placed or performed in visit on 09/09/21   POCT Urinalysis no Micro   Result Value Ref Range    Color, UA YELLOW     Clarity, UA CLEAR     Glucose, UA POC NEGATIVE     Bilirubin, UA NEGATIVE     Ketones, UA NEGATIVE     Spec Grav, UA 1.005     Blood, UA POC SMALL     pH, UA 7     Protein, UA POC NEGATIVE     Urobilinogen, UA NORMAL     Leukocytes, UA NEGATIVE     Nitrite, UA NEGATIVE               ROS:  Review of Systems   Constitutional: negative   HENT: negative   EYES: negative   Respiratory: negative   Gastrointestinal: Constipation. Flatus. GERD significant coughing consumption  Endocrine: negative   Musculoskeletal: negative   Skin: negative   Allergic/Immunological: negative   Hematological: negative   Psychiatric/Behavorial: negative   CV: Cardiology work-up negative. CNS: negative   : Urinary frequency and urgency  S/E:Negative  Renal: Negative     Physical Exam:  Head/neck: Ears: Normal TM. No obstruction. Throat: Mask. Not examined. Thyroid is nonpalpable. Neck: No lymphadenopathy. Eyes: EOMI, PERRLA with no nystagmus  Lungs: Clear to auscultation.   CV: S1-S2 normal.  SANDOVAL murmur. Carotid: No bruit. Abdominal Examination: Bowel sounds present. Soft nontender. No mass no guarding or   rebound. Spine/extremities: No edema. No tenderness to palpation. Skin: No rash  CNS: Patient is alert, cooperative, moves all 4 limbs, ambulates without difficulty, light touch normal.  Good historian. Good orientation. Testing: Urinalysis: Small amount of blood. Blood pressure 118/76, pulse 75, temperature 97 °F (36.1 °C), temperature source Temporal, weight 169 lb (76.7 kg), SpO2 98 %, not currently breastfeeding.          Aishwarya Washington MD

## 2021-09-09 ENCOUNTER — OFFICE VISIT (OUTPATIENT)
Dept: INTERNAL MEDICINE CLINIC | Age: 76
End: 2021-09-09
Payer: MEDICARE

## 2021-09-09 VITALS
WEIGHT: 169 LBS | HEART RATE: 75 BPM | SYSTOLIC BLOOD PRESSURE: 118 MMHG | BODY MASS INDEX: 26.47 KG/M2 | DIASTOLIC BLOOD PRESSURE: 76 MMHG | TEMPERATURE: 97 F | OXYGEN SATURATION: 98 %

## 2021-09-09 DIAGNOSIS — E03.9 ACQUIRED HYPOTHYROIDISM: ICD-10-CM

## 2021-09-09 DIAGNOSIS — F32.A DEPRESSION, UNSPECIFIED DEPRESSION TYPE: ICD-10-CM

## 2021-09-09 DIAGNOSIS — K59.00 CONSTIPATION, UNSPECIFIED CONSTIPATION TYPE: ICD-10-CM

## 2021-09-09 DIAGNOSIS — E78.5 DYSLIPIDEMIA: ICD-10-CM

## 2021-09-09 DIAGNOSIS — E78.5 ELEVATED LIPIDS: ICD-10-CM

## 2021-09-09 DIAGNOSIS — R30.0 DYSURIA: ICD-10-CM

## 2021-09-09 DIAGNOSIS — R06.09 DYSPNEA ON EXERTION: Primary | ICD-10-CM

## 2021-09-09 DIAGNOSIS — K21.9 GASTROESOPHAGEAL REFLUX DISEASE WITHOUT ESOPHAGITIS: ICD-10-CM

## 2021-09-09 DIAGNOSIS — R10.13 EPIGASTRIC PAIN: ICD-10-CM

## 2021-09-09 DIAGNOSIS — J45.20 MILD INTERMITTENT ASTHMA WITHOUT COMPLICATION: ICD-10-CM

## 2021-09-09 LAB
BILIRUBIN, POC: NEGATIVE
BLOOD URINE, POC: ABNORMAL
CLARITY, POC: CLEAR
COLOR, POC: YELLOW
GLUCOSE URINE, POC: NEGATIVE
KETONES, POC: NEGATIVE
LEUKOCYTE EST, POC: NEGATIVE
NITRITE, POC: NEGATIVE
PH, POC: 7
PROTEIN, POC: NEGATIVE
SPECIFIC GRAVITY, POC: 1
UROBILINOGEN, POC: NORMAL

## 2021-09-09 PROCEDURE — 1123F ACP DISCUSS/DSCN MKR DOCD: CPT | Performed by: INTERNAL MEDICINE

## 2021-09-09 PROCEDURE — G8417 CALC BMI ABV UP PARAM F/U: HCPCS | Performed by: INTERNAL MEDICINE

## 2021-09-09 PROCEDURE — 81002 URINALYSIS NONAUTO W/O SCOPE: CPT | Performed by: INTERNAL MEDICINE

## 2021-09-09 PROCEDURE — 3017F COLORECTAL CA SCREEN DOC REV: CPT | Performed by: INTERNAL MEDICINE

## 2021-09-09 PROCEDURE — 1036F TOBACCO NON-USER: CPT | Performed by: INTERNAL MEDICINE

## 2021-09-09 PROCEDURE — 1090F PRES/ABSN URINE INCON ASSESS: CPT | Performed by: INTERNAL MEDICINE

## 2021-09-09 PROCEDURE — 4040F PNEUMOC VAC/ADMIN/RCVD: CPT | Performed by: INTERNAL MEDICINE

## 2021-09-09 PROCEDURE — G8399 PT W/DXA RESULTS DOCUMENT: HCPCS | Performed by: INTERNAL MEDICINE

## 2021-09-09 PROCEDURE — 99214 OFFICE O/P EST MOD 30 MIN: CPT | Performed by: INTERNAL MEDICINE

## 2021-09-09 PROCEDURE — G8427 DOCREV CUR MEDS BY ELIG CLIN: HCPCS | Performed by: INTERNAL MEDICINE

## 2021-09-09 RX ORDER — LEVOTHYROXINE SODIUM 0.07 MG/1
TABLET ORAL
Qty: 90 TABLET | Refills: 1 | Status: SHIPPED | OUTPATIENT
Start: 2021-09-09 | End: 2022-01-10

## 2021-09-09 RX ORDER — CIPROFLOXACIN 500 MG/1
TABLET, FILM COATED ORAL
Qty: 7 TABLET | Refills: 0 | Status: ON HOLD | OUTPATIENT
Start: 2021-09-09 | End: 2022-02-18 | Stop reason: ALTCHOICE

## 2021-09-24 ENCOUNTER — NURSE ONLY (OUTPATIENT)
Dept: INTERNAL MEDICINE CLINIC | Age: 76
End: 2021-09-24
Payer: MEDICARE

## 2021-09-24 DIAGNOSIS — N39.0 URINARY TRACT INFECTION WITH HEMATURIA, SITE UNSPECIFIED: Primary | ICD-10-CM

## 2021-09-24 DIAGNOSIS — R31.9 URINARY TRACT INFECTION WITH HEMATURIA, SITE UNSPECIFIED: Primary | ICD-10-CM

## 2021-09-24 LAB
BILIRUBIN, POC: NEGATIVE
BLOOD URINE, POC: ABNORMAL
CLARITY, POC: CLEAR
COLOR, POC: YELLOW
GLUCOSE URINE, POC: NEGATIVE
KETONES, POC: ABNORMAL
LEUKOCYTE EST, POC: NEGATIVE
NITRITE, POC: NEGATIVE
PH, POC: 6
PROTEIN, POC: NEGATIVE
SPECIFIC GRAVITY, POC: 1.01
UROBILINOGEN, POC: NORMAL

## 2021-09-24 PROCEDURE — 99999 PR OFFICE/OUTPT VISIT,PROCEDURE ONLY: CPT | Performed by: INTERNAL MEDICINE

## 2021-09-24 PROCEDURE — 81002 URINALYSIS NONAUTO W/O SCOPE: CPT | Performed by: INTERNAL MEDICINE

## 2021-09-26 LAB — URINE CULTURE, ROUTINE: NORMAL

## 2021-10-12 ENCOUNTER — APPOINTMENT (OUTPATIENT)
Dept: GENERAL RADIOLOGY | Age: 76
End: 2021-10-12
Payer: MEDICARE

## 2021-10-12 ENCOUNTER — HOSPITAL ENCOUNTER (EMERGENCY)
Age: 76
Discharge: HOME | End: 2021-10-12
Payer: MEDICARE

## 2021-10-12 VITALS
OXYGEN SATURATION: 96 % | WEIGHT: 165 LBS | DIASTOLIC BLOOD PRESSURE: 76 MMHG | RESPIRATION RATE: 18 BRPM | HEART RATE: 96 BPM | BODY MASS INDEX: 25.9 KG/M2 | TEMPERATURE: 98.5 F | HEIGHT: 67 IN | SYSTOLIC BLOOD PRESSURE: 147 MMHG

## 2021-10-12 DIAGNOSIS — J18.9 PNEUMONIA OF RIGHT LOWER LOBE DUE TO INFECTIOUS ORGANISM: Primary | ICD-10-CM

## 2021-10-12 LAB — SARS-COV-2, NAAT: NOT DETECTED

## 2021-10-12 PROCEDURE — 99283 EMERGENCY DEPT VISIT LOW MDM: CPT

## 2021-10-12 PROCEDURE — 87635 SARS-COV-2 COVID-19 AMP PRB: CPT

## 2021-10-12 PROCEDURE — 71045 X-RAY EXAM CHEST 1 VIEW: CPT

## 2021-10-12 NOTE — ED NOTES
Oxygen documentation: Ambulated with pt about 200 feet on room air. Pt tolerated well with steady gait. HR started at 100, max . SpO2 maintained 95-96% on room air.       1. O2 saturation at REST on ROOM AIR = 96%      If saturation is 89% or above please proceed with step 2       2.  O2 saturation with AMBULATION of 200 feet on ROOM AIR = 95-96%     Electronically signed by Nagi Grider RN on 10/12/2021 at 5:06 PM         Nagi Grider RN  10/12/21 5473

## 2021-10-12 NOTE — ED NOTES
All discharge paperwork and follow-up instructions reviewed with pt. Pt verbalized understanding. Pt ambulatory upon discharge in stable condition to private vehicle with Daughter.        Oscar Arana RN  10/12/21 9528

## 2021-10-17 NOTE — ED PROVIDER NOTES
[facility]  ED  EMERGENCY DEPARTMENT ENCOUNTER      This patient was not seen and evaluated by the attending physician. Pt Name: Ana Hidalgo  MRN: [id number]  Ariadnagfnida 1945  Date of evaluation: 10/12/2021  Provider: CASI Geiger - CNP-C  PCP: Dougie Pennington MD      History provided by the patient. CHIEFCOMPLAINT:     Chief Complaint   Patient presents with    Cough     patient started with a cough last Thursday, COVID test negative on Friday. Lack of appetite and lack of sleep during the last couple of days. Cough continues.  Shortness of Breath       HISTORY OF PRESENT ILLNESS:      Ana Hidalgo is a 70 y.o. female who presents to [address] [place] [address]  ED with complaints of a cough. Patient states that her 02 levels had been low at home so her PCP sent her to the ER. She had a virtual visit and her pcp started her on some steroids and antibiotics. She is here for further evaluation. No chest pain or difficulty breathing. LOCATION:-  QUALITY:-  SEVERITY:-  DURATION:-  MODIFYING FACTORS:-    Nursing Notes were reviewed     REVIEW OF SYSTEMS:     Review of Systems  All systems, a total of 10, are reviewed and negative except for those that were just noted in history present illness. PAST MEDICAL HISTORY:     Past Medical History:   Diagnosis Date    Anemia     Anxiety     Asthma '96    PFT's    Back strain     Bell's palsy 04/2018    Rt Side    Chest pain 08/05/2016    GXT: Normal.    Chronic back pain     Cystitis     Depression     Dermatitis     Fibroids     GERD (gastroesophageal reflux disease)     Hematuria '00    Cystoscopy & IVP  (---)    History of recurrent UTIs     Hyperlipidemia     Hypothyroidism 06/05    Insomnia     Irritable bowel syndrome     Palpitations     Pneumonia 11/93         SURGICAL HISTORY:      Past Surgical History:   Procedure Laterality Date    CHOLECYSTECTOMY      COLONOSCOPY  01/04    Neg.          Spouse name: Not on file    Number of children: Not on file    Years of education: Not on file    Highest education level: Not on file   Occupational History    Not on file   Tobacco Use    Smoking status: Former Smoker     Packs/day: 0.50     Years: 6.00     Pack years: 3.00     Types: Cigarettes     Start date: 1966     Quit date: 1972     Years since quittin.8    Smokeless tobacco: Never Used   Vaping Use    Vaping Use: Never used   Substance and Sexual Activity    Alcohol use: Yes     Comment: 2 a month    Drug use: No    Sexual activity: Not on file   Other Topics Concern    Not on file   Social History Narrative    Not on file     Social Determinants of Health     Financial Resource Strain: Low Risk     Difficulty of Paying Living Expenses: Not hard at all   Food Insecurity: No Food Insecurity    Worried About [address] in the Last Year: Never true    [facility] [employer] in the Last Year: Never true   Transportation Needs:     Lack of Transportation (Medical):  Lack of Transportation (Non-Medical):    Physical Activity:     Days of Exercise per Week:     Minutes of Exercise per Session:    Stress:     Feeling of Stress :    Social Connections:     Frequency of Communication with Friends and Family:     Frequency of Social Gatherings with Friends and Family:     Attends Yarsani Services:     Active Member of Clubs or Organizations:     Attends Club or Organization Meetings:     Marital Status:    Intimate Partner Violence:     Fear of Current or Ex-Partner:     Emotionally Abused:     Physically Abused:     Sexually Abused:        SCREENINGS:             PHYSICAL EXAM:       ED Triage Vitals [10/12/21 1613]   BP Temp Temp Source Pulse Resp SpO2 Height Weight   (!) 152/76 98.5 °F (36.9 °C) Oral 103 16 95 % 5' 7\" (1.702 m) 165 lb (74.8 kg)       Physical Exam    CONSTITUTIONAL: Awake and alert. Cooperative. Well-developed. Well-nourished. Non-toxic. Vitals:    10/12/21 1613 10/12/21 1710 10/12/21 1711   BP: (!) 152/76 (!) 147/76    Pulse: 103 100 96   Resp: 16 18    Temp: 98.5 °F (36.9 °C)     TempSrc: Oral     SpO2: 95% 96%    Weight: 165 lb (74.8 kg)     Height: 5' 7\" (1.702 m)       HENT: Normocephalic. Atraumatic. External ears normal, without discharge. TMs clear bilaterally. Nonasal discharge. Oropharynx clear, no erythema. Mucous membranes moist.  EYES: Conjunctiva non-injected, nolid abnormalities noted. No scleral icterus. PERRL. EOM's grossly intact. Anterior chambers clear. NECK: Supple. Normal ROM. No meningismus. No thyroid tenderness or swelling noted. CARDIOVASCULAR: RRR. No Murmer. No carotid bruits. PULMONARY/CHEST WALL: Effort normal. No tachypnea. Lungs clear to ausculation. ABDOMEN: Normal BS. Soft. Nondistended. No tenderness to palpation. No guarding. No hernias noted. No splenomegaly. Back: Spine is midline. No ecchymosis. No crepituson palpation. No obvious subluxation of vertebral column. No saddle anesthesia or evidence of cauda equina. /ANORECTAL: Not assessed  MUSKULOSKELETAL: Normal ROM. No acute deformities. No edema. No tenderness to palpate. SKIN: Warm and dry. NEUROLOGICAL:  GCS 15. CN II-XII grossly intact. Strength is 5/5 in all extremities and sensation is intact. PSYCHIATRIC: Normal affect, normal insight and judgement. Alert and oriented x 3. DIAGNOSTIC RESULTS:     LABS:    Results for orders placed or performed during the hospital encounter of 10/12/21   SARS-CoV-2 NAAT (Rapid)    Specimen: Nasopharyngeal Swab   Result Value Ref Range    SARS-CoV-2, NAAT Not Detected Not Detected         RADIOLOGY:  All x-ray studies are viewed/reviewed by me. Formal interpretations per the radiologist are as follows:      XR CHEST PORTABLE   Final Result   Right basilar airspace disease. EKG:  See EKG interpretation by an attending physician.       PROCEDURES:   N/A    CRITICAL CARE TIME:   N/A    CONSULTS:  None      EMERGENCY DEPARTMENT COURSE andDIFFERENTIAL DIAGNOSIS/MDM:   Vitals:    Vitals:    10/12/21 1613 10/12/21 1710 10/12/21 1711   BP: (!) 152/76 (!) 147/76    Pulse: 103 100 96   Resp: 16 18    Temp: 98.5 °F (36.9 °C)     TempSrc: Oral     SpO2: 95% 96%    Weight: 165 lb (74.8 kg)     Height: 5' 7\" (1.702 m)         Patient wasgiven the following medications:  Medications - No data to display      Patient was evaluated independently by myself with the attending physician available for consultation. patient presented to the ER with complaints of cough and sob. Patient concerned about poss pneumonia. Patient chest xray showed right lower lobe airspace disease. No hypoxia. covid negative. Patient is already on appropriate treatment, discharged home in good condition. Patient laboratory studies, radiographic imaging, and assessment were all discussed with the patient and/orpatient family. There was shared decision-making between myself as well as the patient and/or their surrogate and we are all in agreement with discharge home. There was an opportunity for questions and all questions were answered tothe best of my ability and to the satisfaction of the patient and/or patient family. FINAL IMPRESSION:      1.  Pneumonia of right lower lobe due to infectious organism          DISPOSITION/PLAN:   DISPOSITION Decision To Discharge      PATIENT REFERRED TO:  Yamil Lynne MD  [address]    Call   For follow up      DISCHARGE MEDICATIONS:  Discharge Medication List as of 10/12/2021  5:09 PM                     (Please note thatportions of this note were completed with a voice recognition program.  Efforts were made to edit the dictations, but occasionally words are mis-transcribed.)    CASI London CNP-C (electronicallysigned)        CASI London CNP  10/16/21 9746

## 2021-10-21 ENCOUNTER — TELEPHONE (OUTPATIENT)
Dept: INTERNAL MEDICINE CLINIC | Age: 76
End: 2021-10-21

## 2021-10-21 NOTE — TELEPHONE ENCOUNTER
Please call patient: A letter has been dictated in epic. How this she wishes to receive it?   Dr. Sandra Lopez

## 2021-10-21 NOTE — TELEPHONE ENCOUNTER
Patient states the pharmacy is requiring her have note stating she is immuno compromised due to recent pneumonia. [facility]

## 2021-10-22 ENCOUNTER — TELEPHONE (OUTPATIENT)
Dept: INTERNAL MEDICINE CLINIC | Age: 76
End: 2021-10-22

## 2021-10-22 DIAGNOSIS — R05.9 COUGH: Primary | ICD-10-CM

## 2021-10-22 RX ORDER — GUAIFENESIN AND CODEINE PHOSPHATE 100; 10 MG/5ML; MG/5ML
5 SOLUTION ORAL 3 TIMES DAILY PRN
Qty: 120 ML | Refills: 0 | Status: SHIPPED | OUTPATIENT
Start: 2021-10-22 | End: 2021-11-05

## 2021-10-22 RX ORDER — LEVOFLOXACIN 750 MG/1
750 TABLET ORAL DAILY
Qty: 7 TABLET | Refills: 0 | Status: SHIPPED | OUTPATIENT
Start: 2021-10-22 | End: 2021-10-29

## 2021-10-22 RX ORDER — ALBUTEROL SULFATE 90 UG/1
2 AEROSOL, METERED RESPIRATORY (INHALATION) EVERY 6 HOURS PRN
Qty: 18 G | Refills: 1 | Status: SHIPPED | OUTPATIENT
Start: 2021-10-22

## 2021-10-25 RX ORDER — METHYLPREDNISOLONE 4 MG/1
TABLET ORAL
Qty: 1 KIT | Refills: 0 | Status: SHIPPED | OUTPATIENT
Start: 2021-10-25 | End: 2022-03-10 | Stop reason: ALTCHOICE

## 2021-11-16 DIAGNOSIS — J18.9 PNEUMONIA OF RIGHT LOWER LOBE DUE TO INFECTIOUS ORGANISM: Primary | ICD-10-CM

## 2021-11-16 RX ORDER — METHYLPREDNISOLONE 4 MG/1
TABLET ORAL
Qty: 1 KIT | Refills: 0 | Status: ON HOLD | OUTPATIENT
Start: 2021-11-16 | End: 2022-02-18 | Stop reason: ALTCHOICE

## 2021-11-16 RX ORDER — BENZONATATE 100 MG/1
100 CAPSULE ORAL 3 TIMES DAILY PRN
Qty: 15 CAPSULE | Refills: 0 | Status: SHIPPED | OUTPATIENT
Start: 2021-11-16

## 2021-11-17 ENCOUNTER — HOSPITAL ENCOUNTER (OUTPATIENT)
Age: 76
Discharge: HOME | End: 2021-11-17
Payer: MEDICARE

## 2021-11-17 ENCOUNTER — HOSPITAL ENCOUNTER (OUTPATIENT)
Dept: GENERAL RADIOLOGY | Age: 76
Discharge: HOME | End: 2021-11-17
Payer: MEDICARE

## 2021-11-17 ENCOUNTER — TELEPHONE (OUTPATIENT)
Dept: INTERNAL MEDICINE CLINIC | Age: 76
End: 2021-11-17

## 2021-11-17 DIAGNOSIS — J18.9 PNEUMONIA OF RIGHT LOWER LOBE DUE TO INFECTIOUS ORGANISM: Primary | ICD-10-CM

## 2021-11-17 DIAGNOSIS — J18.9 PNEUMONIA OF RIGHT LOWER LOBE DUE TO INFECTIOUS ORGANISM: ICD-10-CM

## 2021-11-17 PROCEDURE — 71046 X-RAY EXAM CHEST 2 VIEWS: CPT

## 2021-11-17 RX ORDER — DOXYCYCLINE HYCLATE 100 MG
100 TABLET ORAL 2 TIMES DAILY
Qty: 20 TABLET | Refills: 0 | Status: SHIPPED | OUTPATIENT
Start: 2021-11-17 | End: 2021-11-27

## 2021-11-17 NOTE — TELEPHONE ENCOUNTER
Phone call to radiologist Dr. Coreas Oar: Chest x-ray: Improvement in right lower lobe pneumonia but now patient with mid right lower lobe new area of concern for pneumonia. Suggested CT scan of the chest for further evaluation. Patient previously had been treated with Zithromax and Levaquin. Patient with 2 Covid test which have been negative. Plan: Order 1. CT scan of chest concerning recurrent pneumonia. Call me next day for results   2. Start doxycycline   3 . CBC and CMP  4. Patient to obtain another Covid test and call me with results   5.   Consider pulmonary consultation  Dr. Nikki Velasquez

## 2021-11-17 NOTE — TELEPHONE ENCOUNTER
Dr Charito Sanches the radiologist called and would like to speak with you regarding the Pt's chest xray she had this morning.  Please call him back ASA on [phone]

## 2021-11-19 ENCOUNTER — HOSPITAL ENCOUNTER (OUTPATIENT)
Dept: CT IMAGING | Age: 76
Discharge: HOME | End: 2021-11-19
Payer: MEDICARE

## 2021-11-19 ENCOUNTER — TELEPHONE (OUTPATIENT)
Dept: INTERNAL MEDICINE CLINIC | Age: 76
End: 2021-11-19

## 2021-11-19 DIAGNOSIS — J18.9 PNEUMONIA OF RIGHT LOWER LOBE DUE TO INFECTIOUS ORGANISM: ICD-10-CM

## 2021-11-19 DIAGNOSIS — J18.9 ATYPICAL PNEUMONIA: Primary | ICD-10-CM

## 2021-11-19 LAB
A/G RATIO: 1.2 (ref 1.1–2.2)
ALBUMIN SERPL-MCNC: 3.8 G/DL (ref 3.4–5)
ALP BLD-CCNC: 104 U/L (ref 40–129)
ALT SERPL-CCNC: 17 U/L (ref 10–40)
ANION GAP SERPL CALCULATED.3IONS-SCNC: 18 MMOL/L (ref 3–16)
AST SERPL-CCNC: 18 U/L (ref 15–37)
BILIRUB SERPL-MCNC: <0.2 MG/DL (ref 0–1)
BUN BLDV-MCNC: 13 MG/DL (ref 7–20)
CALCIUM SERPL-MCNC: 9.3 MG/DL (ref 8.3–10.6)
CHLORIDE BLD-SCNC: 101 MMOL/L (ref 99–110)
CO2: 23 MMOL/L (ref 21–32)
CREAT SERPL-MCNC: 0.7 MG/DL (ref 0.6–1.2)
GFR AFRICAN AMERICAN: >60
GFR NON-AFRICAN AMERICAN: >60
GLUCOSE BLD-MCNC: 89 MG/DL (ref 70–99)
HCT VFR BLD CALC: 31.4 % (ref 36–48)
HEMOGLOBIN: 9.8 G/DL (ref 12–16)
MCH RBC QN AUTO: 26.3 PG (ref 26–34)
MCHC RBC AUTO-ENTMCNC: 31.1 G/DL (ref 31–36)
MCV RBC AUTO: 84.4 FL (ref 80–100)
PDW BLD-RTO: 14.5 % (ref 12.4–15.4)
PLATELET # BLD: 702 K/UL (ref 135–450)
PMV BLD AUTO: 7.9 FL (ref 5–10.5)
POTASSIUM SERPL-SCNC: 3.8 MMOL/L (ref 3.5–5.1)
RBC # BLD: 3.72 M/UL (ref 4–5.2)
SODIUM BLD-SCNC: 142 MMOL/L (ref 136–145)
TOTAL PROTEIN: 7 G/DL (ref 6.4–8.2)
WBC # BLD: 15 K/UL (ref 4–11)

## 2021-11-19 PROCEDURE — 71250 CT THORAX DX C-: CPT

## 2021-11-19 NOTE — TELEPHONE ENCOUNTER
Phone call to patient. From 11/19/2021 reviewed CT scan of chest: Patchy multifocal bilateral consolidation greatest within the lower lobes compatible with pneumonia both atypical and typical etiologies. Also reviewed calcifications in the thoracic aorta as well as coronary artery calcification. Of significance: Patient with cardiac work-up in July/2021 nuclear medicine scan was normal.  Discussed with patient setting up pulmonary consultation urgently. At this time patient states she is feeling better with less coughing on the doxycycline and Medrol Dosepak.   Call for any acute change in condition  Dr. Kash Magana

## 2021-11-22 ENCOUNTER — OFFICE VISIT (OUTPATIENT)
Dept: PULMONOLOGY | Age: 76
End: 2021-11-22
Payer: MEDICARE

## 2021-11-22 VITALS
RESPIRATION RATE: 18 BRPM | SYSTOLIC BLOOD PRESSURE: 127 MMHG | TEMPERATURE: 98.1 F | DIASTOLIC BLOOD PRESSURE: 76 MMHG | HEIGHT: 67 IN | BODY MASS INDEX: 26.06 KG/M2 | HEART RATE: 78 BPM | WEIGHT: 166 LBS | OXYGEN SATURATION: 97 %

## 2021-11-22 DIAGNOSIS — R91.8 LUNG INFILTRATE ON CT: ICD-10-CM

## 2021-11-22 DIAGNOSIS — R05.9 COUGH: Primary | ICD-10-CM

## 2021-11-22 DIAGNOSIS — G47.33 OSA (OBSTRUCTIVE SLEEP APNEA): ICD-10-CM

## 2021-11-22 PROCEDURE — 1123F ACP DISCUSS/DSCN MKR DOCD: CPT | Performed by: INTERNAL MEDICINE

## 2021-11-22 PROCEDURE — G8417 CALC BMI ABV UP PARAM F/U: HCPCS | Performed by: INTERNAL MEDICINE

## 2021-11-22 PROCEDURE — 99204 OFFICE O/P NEW MOD 45 MIN: CPT | Performed by: INTERNAL MEDICINE

## 2021-11-22 PROCEDURE — G8427 DOCREV CUR MEDS BY ELIG CLIN: HCPCS | Performed by: INTERNAL MEDICINE

## 2021-11-22 PROCEDURE — 1036F TOBACCO NON-USER: CPT | Performed by: INTERNAL MEDICINE

## 2021-11-22 PROCEDURE — 3017F COLORECTAL CA SCREEN DOC REV: CPT | Performed by: INTERNAL MEDICINE

## 2021-11-22 PROCEDURE — 4040F PNEUMOC VAC/ADMIN/RCVD: CPT | Performed by: INTERNAL MEDICINE

## 2021-11-22 PROCEDURE — G8399 PT W/DXA RESULTS DOCUMENT: HCPCS | Performed by: INTERNAL MEDICINE

## 2021-11-22 PROCEDURE — G8484 FLU IMMUNIZE NO ADMIN: HCPCS | Performed by: INTERNAL MEDICINE

## 2021-11-22 PROCEDURE — 1090F PRES/ABSN URINE INCON ASSESS: CPT | Performed by: INTERNAL MEDICINE

## 2021-11-22 RX ORDER — OMEPRAZOLE 40 MG/1
40 CAPSULE, DELAYED RELEASE ORAL
Qty: 90 CAPSULE | Refills: 1 | Status: SHIPPED | OUTPATIENT
Start: 2021-11-22 | End: 2022-04-01

## 2021-11-22 RX ORDER — DOXYCYCLINE HYCLATE 50 MG/1
324 CAPSULE, GELATIN COATED ORAL
COMMUNITY
End: 2022-05-18

## 2021-11-22 RX ORDER — BENZONATATE 200 MG/1
200 CAPSULE ORAL 3 TIMES DAILY PRN
Qty: 90 CAPSULE | Refills: 3 | Status: SHIPPED | OUTPATIENT
Start: 2021-11-22 | End: 2021-12-22

## 2021-11-22 RX ORDER — OMEPRAZOLE 20 MG/1
20 CAPSULE, DELAYED RELEASE ORAL DAILY
Status: ON HOLD | COMMUNITY
End: 2022-02-18 | Stop reason: ALTCHOICE

## 2021-11-22 ASSESSMENT — ENCOUNTER SYMPTOMS
COUGH: 1
WHEEZING: 0
SHORTNESS OF BREATH: 0
SORE THROAT: 0
RHINORRHEA: 0
GASTROINTESTINAL NEGATIVE: 1
HEMOPTYSIS: 0
HEARTBURN: 0
EYES NEGATIVE: 1
ALLERGIC/IMMUNOLOGIC NEGATIVE: 1

## 2021-11-22 NOTE — PROGRESS NOTES
Madelaine Ulloa (:  1945) is a 78 y.o. female,New patient, here for evaluation of the following chief complaint(s):  Follow-up (NPT ongoing pneumonia  r/b Dr Day) and Cough               ASSESSMENT/PLAN:  1. Cough  -     Home Sleep Study; Future  -     Allergen, Respiratory, Region 5 Panel; Future  -     IgG 4; Future  -     IgE; Future  -     Full PFT Study With Bronchodilator; Future  -     IgG, IgA, IgM; Future  -     CBC Auto Differential; Future  2. Lung infiltrate on CT  -     Home Sleep Study; Future  -     Allergen, Respiratory, Region 5 Panel; Future  -     IgG 4; Future  -     IgE; Future  -     Full PFT Study With Bronchodilator; Future  -     IgG, IgA, IgM; Future  -     CBC Auto Differential; Future  -     XR CHEST STANDARD (2 VW); Future  3. KIANNA (obstructive sleep apnea)  -     Home Sleep Study; Future      CT scan done 2021  Impression   Patchy multifocal bilateral consolidation, greatest within the lower lobes,   compatible with pneumonia.  Both typical and atypical etiologies, including   viral pneumonia could be considered.  Follow-up to resolution is recommended.           Chest x-ray done 2021  Impression   Interval improvement in the right basilar infiltrate, when compared to the   previous study performed 10/12/2021.  Findings indicating a superior segment   right lower lobe pneumonia.  Noncontrast CT scan of the chest can be   performed for further evaluation.  COPD again identified.       .     Chest x-ray done 10/12/2021  Impression   Right basilar airspace disease.      Covid testing done on 10/7/2021, 10/12/2021, ll negative        Stress test on 2021   Conclusions        Summary    Excellent functional capacity at 10 METs    Normal LV function.   Lamona Rachell is nearly uniform isotope uptake at stress and rest. There is no clear    evidence of myocardial ischemia or scar.       Stress Protocols         Lung infiltrates  Will do cxr in 3 weeks  May need to do bronchoscopy      Cough   Will do the following    Will get   PFT and methacholine  Cbc with diff  IGG, IGA, IGM  IGE, RAST    Please do blood work in 2 weeks. Will stop  prilosec otc    Will start on   prilosec 40 mg      Will refill  Tessolon Perles 200 mg TID as needed          I  RECOMMENDATIONS:     she will be scheduled for polysomnography in order to evaluate for the presence and severity of obstructive sleep apnea. He was given a discussion of the pathophysiology, evaluation and treatment of apnea. Thyroid function tests are recommended if not done recently. Advised to avoid driving when too sleepy to function safely and given a discussion of the risks of untreated apnea such as accidents, cognitive impairment, mood impairment, high blood pressure, various cardiac diseases and stroke. ESS is 10/24         Will get Sleep study  I will call you with results    Will do home sleep study                  No follow-ups on file.          Subjective   SUBJECTIVE/OBJECTIVE:  Consult from Dr. Yoly Timmons  For recurrent pneumonia  Initially seen 11/22/2021      Started in 10/2021   Was feeling bad, and chest heavy and coughing  And was tested for covid and felt congested  And then seen by Dr. Yoly Timmons  And sounded like pna  Was on abx and cough med  And steroids    Oxygen level was low  And went to ER  And then done cxr  And not getting better  Was told it was pna  And continued what Dr. Yoly Timmons given      However over the past month  Feeling worse  And then called pcp  And started back on abx and steroids  And given albuterol prn        Seen by urologist  And was to have rales on both sides by the anesthesiologist  And then had cxr and then had ct scan   And then did blood work      And then last week      Asthma as girl  And was really bad and then out grew this    Only using albuterol         Cough- dry  Worse with   talking      Better with Perles        Sinus   No sneezing  And blowing nose  No congestion  Not thick  No real issues          Subjective:              78old year old,female, with PMH significant for cough,  that presents today for initial evaluation. Pt reports snoring for years  and that it is getting same. Pt reports does  snore moderate for years. Patient's partner does complain of pt snoring. Pt's partner does not notice that she stops breathing during sleep. Pt's  does wake   herself with dry mouth, sweating, cough, fatigue, nocturia x 2, leg cramps . Pt does report fatigue or tiredness frequently. Pt sleeps more than 7 hours, and at rare times overwhelming sleepiness attacks. Pt  Does  dozes unintentionally while watching TV . While driving  Does not feel drowsy / nod off / fall sleep at stop lights. Pt does not have h/o sleepiness associated wrecks/near wrecks. Pt does  nod off while  unattended. Pt does not report having restless legs 0 times a week. This is often accompanied by leg jerks during sleep, numbness in legs or feet, aches/burning/cramps in legs, feet. does report having nasal congestion. negative for use of nasal sprays, nose or sinus surgery. negative for broken nose, tonsillectomy, sleeping w/ chest raised. Does not sleep with oxygen. Pt does not have a dental appliance or braces on teeth. does teeth grinding. Does not report nightmares, sleep walking, dreaming during naps. When angry or laughing Kim Posada does not report cataplexy. she does not report hallucinations when dozing off or immediately upon awakening. Does not report sleep paralysis. Does not have parasomnia. Patient's [place] Sleepiness score  is required. Patient  Is CONSISTENT with moderate daytime sleepiness. Cough  This is a new problem. The current episode started more than 1 month ago. The problem has been waxing and waning. The cough is non-productive. Associated symptoms include ear pain.  Pertinent negatives include no chest pain, chills, ear congestion, fever, headaches, heartburn, hemoptysis, myalgias, nasal congestion, postnasal drip, rash, rhinorrhea, sore throat, shortness of breath, sweats, weight loss or wheezing. Review of Systems   Constitutional: Negative. Negative for chills, fever and weight loss. HENT: Positive for ear pain. Negative for postnasal drip, rhinorrhea and sore throat. Eyes: Negative. Respiratory: Positive for cough. Negative for hemoptysis, shortness of breath and wheezing. Cardiovascular: Negative. Negative for chest pain. Gastrointestinal: Negative. Negative for heartburn. Endocrine: Negative. Genitourinary: Negative. Musculoskeletal: Negative. Negative for myalgias. Skin: Negative. Negative for rash. Allergic/Immunologic: Negative. Neurological: Negative. Negative for headaches. Hematological: Negative. Psychiatric/Behavioral: Negative. Objective   Physical Exam  Vitals and nursing note reviewed. Constitutional:       General: She is not in acute distress. Appearance: Normal appearance. She is not ill-appearing. HENT:      Head: Normocephalic and atraumatic. Right Ear: External ear normal.      Left Ear: External ear normal.      Nose: Nose normal.      Mouth/Throat:      Mouth: Mucous membranes are moist.      Pharynx: Oropharynx is clear. Comments: Mallampati 2  Eyes:      General: No scleral icterus. Extraocular Movements: Extraocular movements intact. Conjunctiva/sclera: Conjunctivae normal.      Pupils: Pupils are equal, round, and reactive to light. Cardiovascular:      Rate and Rhythm: Normal rate and regular rhythm. Pulses: Normal pulses. Heart sounds: Normal heart sounds. No murmur heard. No friction rub. Pulmonary:      Effort: No respiratory distress. Breath sounds: No stridor. No wheezing, rhonchi or rales. Chest:      Chest wall: No tenderness. Abdominal:      General: Abdomen is flat.  Bowel sounds are normal. There is no

## 2021-11-22 NOTE — PROGRESS NOTES
MA Communication:   The following orders are received by verbal communication from Sisi Pretty MD    Orders include:  HST (sleep center to contact pt)       PFT/MCT and CXR scheduled 12/16/21       BW to be done in 2 weeks       3 mo fu (per Dr. Jaycob Capone) scheduled 2/9/22

## 2021-11-22 NOTE — PATIENT INSTRUCTIONS
ASSESSMENT/PLAN:  1. Cough  -     Home Sleep Study; Future  -     Allergen, Respiratory, Region 5 Panel; Future  -     IgG 4; Future  -     IgE; Future  -     Full PFT Study With Bronchodilator; Future  -     IgG, IgA, IgM; Future  -     CBC Auto Differential; Future  2. Lung infiltrate on CT  -     Home Sleep Study; Future  -     Allergen, Respiratory, Region 5 Panel; Future  -     IgG 4; Future  -     IgE; Future  -     Full PFT Study With Bronchodilator; Future  -     IgG, IgA, IgM; Future  -     CBC Auto Differential; Future  -     XR CHEST STANDARD (2 VW); Future  3. KIANNA (obstructive sleep apnea)  -     Home Sleep Study; Future      CT scan done 11/19/2021  Impression   Patchy multifocal bilateral consolidation, greatest within the lower lobes,   compatible with pneumonia.  Both typical and atypical etiologies, including   viral pneumonia could be considered.  Follow-up to resolution is recommended.           Chest x-ray done 11/17/2021  Impression   Interval improvement in the right basilar infiltrate, when compared to the   previous study performed 10/12/2021.  Findings indicating a superior segment   right lower lobe pneumonia.  Noncontrast CT scan of the chest can be   performed for further evaluation.  COPD again identified.       .     Chest x-ray done 10/12/2021  Impression   Right basilar airspace disease. Covid testing done on 10/7/2021, 10/12/2021, 11/18/2021all negative        Stress test on 7/6/2021   Conclusions        Summary    Excellent functional capacity at 10 METs    Normal LV function.    There is nearly uniform isotope uptake at stress and rest. There is no clear    evidence of myocardial ischemia or scar.       Stress Protocols         Lung infiltrates  Will do cxr in 3 weeks  May need to do bronchoscopy      Cough   Will do the following    Will get   PFT and methacholine  Cbc with diff  IGG, IGA, IGM  IGE, RAST    Please do blood work in 2 weeks.     Will stop  prilosec otc    Will start on   prilosec 40 mg      Will refill  Tessolon Perles 200 mg TID as needed          I  RECOMMENDATIONS:     she will be scheduled for polysomnography in order to evaluate for the presence and severity of obstructive sleep apnea. He was given a discussion of the pathophysiology, evaluation and treatment of apnea. Thyroid function tests are recommended if not done recently. Advised to avoid driving when too sleepy to function safely and given a discussion of the risks of untreated apnea such as accidents, cognitive impairment, mood impairment, high blood pressure, various cardiac diseases and stroke. ESS is 10/24         Will get Sleep study  I will call you with results    Will do home sleep study    Remember to bring a list of pulmonary medications and any CPAP or BiPAP machines to your next appointment with the office. Please keep all of your future appointments scheduled by [address], [facility] office. Out of respect for other patients and providers, you may be asked to reschedule your appointment if you arrive later than your scheduled appointment time. Appointments cancelled less than 24hrs in advance will be considered a no show. Patients with three missed appointments within 1 year or four missed appointments within 2 years can be dismissed from the practice. Please be aware that our physicians are required to work in the Intensive Care Unit at [facility].  Your appointment may need to be rescheduled if they are designated to work during your appointment time. You may receive a survey regarding the care you received during your visit. Your input is valuable to us. We encourage you to complete and return your survey. We hope you will choose us in the future for your healthcare needs. Pt instructed of all future appointment dates & times, including radiology, labs, procedures & referrals.  If procedures were scheduled preparation instructions provided. Instructions on future appointments with [facility] were given.

## 2021-11-22 NOTE — LETTER
11/22/21        Shanda Mckeon      I have seen this patient in the office today and wanted to communicate my findings and recommendations. Patient Instructions        ASSESSMENT/PLAN:  1. Cough  -     Home Sleep Study; Future  -     Allergen, Respiratory, Region 5 Panel; Future  -     IgG 4; Future  -     IgE; Future  -     Full PFT Study With Bronchodilator; Future  -     IgG, IgA, IgM; Future  -     CBC Auto Differential; Future  2. Lung infiltrate on CT  -     Home Sleep Study; Future  -     Allergen, Respiratory, Region 5 Panel; Future  -     IgG 4; Future  -     IgE; Future  -     Full PFT Study With Bronchodilator; Future  -     IgG, IgA, IgM; Future  -     CBC Auto Differential; Future  -     XR CHEST STANDARD (2 VW); Future  3. KIANNA (obstructive sleep apnea)  -     Home Sleep Study; Future      CT scan done 11/19/2021  Impression   Patchy multifocal bilateral consolidation, greatest within the lower lobes,   compatible with pneumonia.  Both typical and atypical etiologies, including   viral pneumonia could be considered.  Follow-up to resolution is recommended.           Chest x-ray done 11/17/2021  Impression   Interval improvement in the right basilar infiltrate, when compared to the   previous study performed 10/12/2021.  Findings indicating a superior segment   right lower lobe pneumonia.  Noncontrast CT scan of the chest can be   performed for further evaluation.  COPD again identified.       .     Chest x-ray done 10/12/2021  Impression   Right basilar airspace disease.      Covid testing done on 10/7/2021, 10/12/2021, 11/18/2021all negative        Stress test on 7/6/2021   Conclusions        Summary    Excellent functional capacity at 10 METs    Normal LV function.   Omelia Sovereign is nearly uniform isotope uptake at stress and rest. There is no clear    evidence of myocardial ischemia or scar.       Stress Protocols         Lung infiltrates  Will do cxr in 3 weeks  May need to do bronchoscopy      Cough Will do the following    Will get   PFT and methacholine  Cbc with diff  IGG, IGA, IGM  IGE, RAST    Please do blood work in 2 weeks. Will stop  prilosec otc    Will start on   prilosec 40 mg      Will refill  Tessolon Perles 200 mg TID as needed          I  RECOMMENDATIONS:     she will be scheduled for polysomnography in order to evaluate for the presence and severity of obstructive sleep apnea. He was given a discussion of the pathophysiology, evaluation and treatment of apnea. Thyroid function tests are recommended if not done recently. Advised to avoid driving when too sleepy to function safely and given a discussion of the risks of untreated apnea such as accidents, cognitive impairment, mood impairment, high blood pressure, various cardiac diseases and stroke.        ESS is 10/24         Will get Sleep study  I will call you with results    Will do home sleep study                         Thank you for allowing me to assist in the care of the Cece Smith MD

## 2021-12-06 ENCOUNTER — HOSPITAL ENCOUNTER (OUTPATIENT)
Age: 76
Discharge: HOME | End: 2021-12-06
Payer: MEDICARE

## 2021-12-06 ENCOUNTER — HOSPITAL ENCOUNTER (OUTPATIENT)
Dept: GENERAL RADIOLOGY | Age: 76
Discharge: HOME | End: 2021-12-06
Payer: MEDICARE

## 2021-12-06 DIAGNOSIS — R91.8 LUNG INFILTRATE ON CT: ICD-10-CM

## 2021-12-06 DIAGNOSIS — R05.9 COUGH: ICD-10-CM

## 2021-12-06 LAB
BASOPHILS ABSOLUTE: 0.1 K/UL (ref 0–0.2)
BASOPHILS RELATIVE PERCENT: 1.4 %
EOSINOPHILS ABSOLUTE: 0.2 K/UL (ref 0–0.6)
EOSINOPHILS RELATIVE PERCENT: 2.6 %
HCT VFR BLD CALC: 32.4 % (ref 36–48)
HEMOGLOBIN: 10.2 G/DL (ref 12–16)
IGA: 259 MG/DL (ref 70–400)
IGG: 858 MG/DL (ref 700–1600)
IGM: 99 MG/DL (ref 40–230)
LYMPHOCYTES ABSOLUTE: 1.5 K/UL (ref 1–5.1)
LYMPHOCYTES RELATIVE PERCENT: 21.9 %
MCH RBC QN AUTO: 26 PG (ref 26–34)
MCHC RBC AUTO-ENTMCNC: 31.4 G/DL (ref 31–36)
MCV RBC AUTO: 82.8 FL (ref 80–100)
MONOCYTES ABSOLUTE: 0.4 K/UL (ref 0–1.3)
MONOCYTES RELATIVE PERCENT: 6.2 %
NEUTROPHILS ABSOLUTE: 4.5 K/UL (ref 1.7–7.7)
NEUTROPHILS RELATIVE PERCENT: 67.9 %
PDW BLD-RTO: 16.5 % (ref 12.4–15.4)
PLATELET # BLD: 283 K/UL (ref 135–450)
PMV BLD AUTO: 8.5 FL (ref 5–10.5)
RBC # BLD: 3.91 M/UL (ref 4–5.2)
WBC # BLD: 6.7 K/UL (ref 4–11)

## 2021-12-06 PROCEDURE — 85025 COMPLETE CBC W/AUTO DIFF WBC: CPT

## 2021-12-06 PROCEDURE — 36415 COLL VENOUS BLD VENIPUNCTURE: CPT

## 2021-12-06 PROCEDURE — 82784 ASSAY IGA/IGD/IGG/IGM EACH: CPT

## 2021-12-06 PROCEDURE — 82785 ASSAY OF IGE: CPT

## 2021-12-06 PROCEDURE — 71046 X-RAY EXAM CHEST 2 VIEWS: CPT

## 2021-12-06 PROCEDURE — 82787 IGG 1 2 3 OR 4 EACH: CPT

## 2021-12-06 PROCEDURE — 86003 ALLG SPEC IGE CRUDE XTRC EA: CPT

## 2021-12-07 LAB — IGG 4: 33 MG/DL (ref 1–123)

## 2021-12-09 LAB — IGE: 50 KU/L

## 2021-12-10 LAB
2000687N OAK TREE IGE: <0.1 KU/L (ref 0–0.34)
ALLERGEN BERMUDA GRASS IGE: <0.1 KU/L (ref 0–0.34)
ALLERGEN BIRCH IGE: <0.1 KU/L (ref 0–0.34)
ALLERGEN DOG DANDER IGE: <0.1 KU/L (ref 0–0.34)
ALLERGEN GERMAN COCKROACH IGE: <0.1 KU/L (ref 0–0.34)
ALLERGEN HORMODENDRUM IGE: <0.1 KUL/L (ref 0–0.34)
ALLERGEN MOUSE EPITHELIA IGE: <0.1 KU/L (ref 0–0.34)
ALLERGEN PECAN TREE IGE: <0.1 KU/L (ref 0–0.34)
ALLERGEN PIGWEED ROUGH IGE: <0.1 KU/L (ref 0–0.34)
ALLERGEN SHEEP SORREL (W18) IGE: <0.1 KU/L (ref 0–0.34)
ALLERGEN TREE SYCAMORE: <0.1 KU/L (ref 0–0.34)
ALLERGEN WALNUT TREE IGE: <0.1 KU/L (ref 0–0.34)
ALLERGEN WHITE MULBERRY TREE, IGE: <0.1 KU/L (ref 0–0.34)
ALLERGEN, TREE, WHITE ASH IGE: <0.1 KU/L (ref 0–0.34)
ALTERNARIA ALTERNATA: 8.88 KU/L (ref 0–0.34)
ASPERGILLUS FUMIGATUS: <0.1 KU/L (ref 0–0.34)
CAT DANDER ANTIBODY: <0.1 KU/L (ref 0–0.34)
COTTONWOOD TREE: <0.1 KU/L (ref 0–0.34)
D. FARINAE: <0.1 KU/L (ref 0–0.34)
D. PTERONYSSINUS: <0.1 KU/L (ref 0–0.34)
ELM TREE: <0.1 KU/L (ref 0–0.34)
IGE: 82 IU/ML
MAPLE/BOXELDER TREE: <0.1 KU/L (ref 0–0.34)
MOUNTAIN CEDAR TREE: <0.1 KU/L (ref 0–0.34)
MUCOR RACEMOSUS: <0.1 KU/L (ref 0–0.34)
P. NOTATUM: <0.1 KU/L (ref 0–0.34)
RUSSIAN THISTLE: <0.1 KU/L (ref 0–0.34)
SHORT RAGWD(A ARTEMIS.) IGE: <0.1 KU/L (ref 0–0.34)
TIMOTHY GRASS: <0.1 KU/L (ref 0–0.34)

## 2021-12-14 ENCOUNTER — HOSPITAL ENCOUNTER (OUTPATIENT)
Dept: SLEEP CENTER | Age: 76
Discharge: HOME | End: 2021-12-16
Payer: MEDICARE

## 2021-12-14 DIAGNOSIS — G47.33 OSA (OBSTRUCTIVE SLEEP APNEA): ICD-10-CM

## 2021-12-14 DIAGNOSIS — R05.9 COUGH: ICD-10-CM

## 2021-12-14 DIAGNOSIS — R91.8 LUNG INFILTRATE ON CT: ICD-10-CM

## 2021-12-14 PROCEDURE — 95806 SLEEP STUDY UNATT&RESP EFFT: CPT

## 2021-12-16 ENCOUNTER — HOSPITAL ENCOUNTER (OUTPATIENT)
Dept: PULMONOLOGY | Age: 76
Discharge: HOME | End: 2021-12-16
Payer: MEDICARE

## 2021-12-16 ENCOUNTER — TELEPHONE (OUTPATIENT)
Dept: PULMONOLOGY | Age: 76
End: 2021-12-16

## 2021-12-16 DIAGNOSIS — R91.8 LUNG INFILTRATE ON CT: ICD-10-CM

## 2021-12-16 DIAGNOSIS — R05.9 COUGH: ICD-10-CM

## 2021-12-16 PROCEDURE — 94726 PLETHYSMOGRAPHY LUNG VOLUMES: CPT

## 2021-12-16 PROCEDURE — 6360000002 HC RX W HCPCS: Performed by: INTERNAL MEDICINE

## 2021-12-16 PROCEDURE — 94010 BREATHING CAPACITY TEST: CPT

## 2021-12-16 PROCEDURE — 95806 SLEEP STUDY UNATT&RESP EFFT: CPT | Performed by: INTERNAL MEDICINE

## 2021-12-16 PROCEDURE — 94760 N-INVAS EAR/PLS OXIMETRY 1: CPT

## 2021-12-16 PROCEDURE — 94729 DIFFUSING CAPACITY: CPT

## 2021-12-16 PROCEDURE — 94070 EVALUATION OF WHEEZING: CPT

## 2021-12-16 RX ORDER — BUDESONIDE AND FORMOTEROL FUMARATE DIHYDRATE 160; 4.5 UG/1; UG/1
2 AEROSOL RESPIRATORY (INHALATION) 2 TIMES DAILY
Qty: 1 EACH | Refills: 2 | Status: SHIPPED | OUTPATIENT
Start: 2021-12-16 | End: 2022-03-10 | Stop reason: ALTCHOICE

## 2021-12-16 RX ORDER — ALBUTEROL SULFATE 2.5 MG/3ML
2.5 SOLUTION RESPIRATORY (INHALATION) ONCE
Status: COMPLETED | OUTPATIENT
Start: 2021-12-16 | End: 2021-12-16

## 2021-12-16 RX ADMIN — METHACHOLINE CHLORIDE 25 MG: 100 POWDER, FOR SOLUTION RESPIRATORY (INHALATION) at 13:08

## 2021-12-16 RX ADMIN — METHACHOLINE CHLORIDE 2.5 MG: 100 POWDER, FOR SOLUTION RESPIRATORY (INHALATION) at 12:56

## 2021-12-16 RX ADMIN — METHACHOLINE CHLORIDE 0.25 MG: 100 POWDER, FOR SOLUTION RESPIRATORY (INHALATION) at 12:52

## 2021-12-16 RX ADMIN — METHACHOLINE CHLORIDE 10 MG: 100 POWDER, FOR SOLUTION RESPIRATORY (INHALATION) at 13:01

## 2021-12-16 RX ADMIN — ALBUTEROL SULFATE 2.5 MG: 2.5 SOLUTION RESPIRATORY (INHALATION) at 13:15

## 2021-12-16 NOTE — TELEPHONE ENCOUNTER
Did not sleep well at night of hst    cxr done 12/6/21    Impression   Persistent bilateral lower lobe airspace disease, similar or perhaps slightly   decreased.  Multifocal pneumonia is considered most likely.             RAST testing was done showing positive for Alternaria alter Xi-8.88  IgE level of 82            IgG, IgM and IgA are all normal    CBC showed a slight anemia of an H&H of 10.2 and 32.4 with an increased RDW, and eosinophil level of 200            I talked with the patient and went over the results of the sleep study she reports that she was not sleeping well on the night of the sleep study and it may be actually more severe than what we are seeing  Went over the results of the chest x-ray and the initial blood work  She is going to have a PFT and a methacholine today  I already feel that there is probably going to be an element of both asthma and sleep apnea  We will go ahead and send in the Symbicort to her pharmacy to see if this will start later today                   Katarina [address]  [address] [facility] [address]  Dept: [phone]  Loc: [phone]     Diagnosis:  [x] KIANNA (ICD-10: G47.33)  o CSA (ICD-10: G47.31)  [] Complex Sleep Apnea (ICD-10: G47.37)  []  (ICD-10: G47.37)  [] Hypoxemia (ICD-10: R09.02)  [] COPD (J44.90)  [] Chronic Respiratory Failure with hypoxemia (J96.11)  [] Chronicr Respiratory Failure with hypercapnia (J96.12)  [] Restrictive Lung Disease (J98.4)      [x] New Rx (Device Preference: ____________autoresmed_____________)     [] Change Order       [] Replace ___________  [] Clinical assessments and may include IN-Check device, spirometry and ETCO2 PRN    [] Discontinue Order -  [] CPAP    [] BPAP    [] PAP    [] Oxygen   /   AMA?  [] Yes   [] No              Therapy    AHI: ________ INGRID: ________  LOW SpO2: ________%      DME:[facility]    DEVICE / SETTINGS RAMP / COMFORT INTERFACE   []  CPAP () Pressure    Ramp: _________ min's  [] Ramp to patient preference General PAP Supply Kit  Medicaid does not cover heated tubing  ([employer])  PAP Tubing:  [x] Heated ()- 1/3 mos                         [x] Regular () - 1/3 mos  [x] Disposable Water Chamber () - 1/6 mos  [x] Disposable Filter () - 2/mo  [x] Non-Disposable Filter () - 1/6 mos   []  BiLevel PAP ()           IPAP        []  BiLevel PAP with   ()       Backup Rate ()      EPAP Rate  [] Adjust FLEX to patient comfort       SUPPLEMENTAL OXYGEN  [] OXYGEN:      Liter/min: _________  [] Continuous        [] Nocturnal  [] Bleed into PAP Device      [x]  [employer] 5                  Max Press 15  Mask Interface Kit    [x] Mask interface () - 1/3 mos  [x] Nasal Cushion () - 2/mo  [x] Nasal Pillows ()  -2/mo  [x] Headgear () - 1/6 mos   []  AutoBiLevel () Pressure  ()      Support           []  ResMed® IVAPS EPAP  [] Overnight Oximetry on Room Air  [] Overnight Oximetry on PAP Therapy    Target Pt Rate  Min PS      Target Va  Patient Ht  Ti Max                Ti Min        Rise time  Max PS  Trigger  Cycle  Epap  Epap max  Epap min  The patient has a history of:  [] Excessive Daytime Sleepiness  [] Insomnia  [] Impaired Cognition  [] Ischemic Heart Disease  [] Hypertension  [] Mood Disorders          [] History of Stroke  Additional Orders:______________________________________________________________________________________________________________________________________________________________________________    [] Titrate to comfort Full Face Mask Kit    [] Full face mask () - 1/3 mos  [] Full Face Cushion () - 1/mo  [] Headgear () - 1/6 mos                                           [] Respironics® ASV Advanced ()     EPAP Min  PS Min      EPAP Max  PS Max   Additional Supplies    [] Chin Strap ()  [] Heated Humidifier improve sleep quality, or reduce AHI to acceptable levels.          [] There is significant improvement of the respiratory events on the RAD                                                                                                                                                                                                                                  Ion Li MD               NPI- [id number]     [facility]- .[id number]                    12/16/21       ____________________________                        _______________________           Physician Signature                                                         Date

## 2021-12-18 LAB — PATHOLOGY/CYTOLOGY REPORT: NORMAL

## 2021-12-28 NOTE — PROCEDURES
[address] [address]                               PULMONARY FUNCTION    PATIENT NAME: Kee Barron                     :        1945  MED REC NO:   [id number]                          ROOM:  ACCOUNT NO:   [de-identified]                           ADMIT DATE: 2021  PROVIDER:     Murali Sanz MD    DATE OF PROCEDURE:  2021    METHACHOLINE CHALLENGE    Five incrementally increasing doses of methacholine were given. There  was no drop in FEV1. IMPRESSION:  Negative methacholine.         Cornelia Pollock MD    D: 2021 10:42:34       T: 2021 15:17:02     [email or web address]  Job#: [id number]     Doc#: [id number]    CC:

## 2021-12-28 NOTE — PROCEDURES
[address] [address]                               PULMONARY FUNCTION    PATIENT NAME: Mitchell Hernandez                     :        1945  MED REC NO:   [id number]                          ROOM:  ACCOUNT NO:   [de-identified]                           ADMIT DATE: 2021  PROVIDER:     Jesica Lilly MD    DATE OF PROCEDURE:  2021    Full PFT done. FEV1 1.97, 78% predicted; FVC of 2.64, 79% predicted;  FEV1 to FVC ratio of 75% showing no obstructive lung defect. Lung  volumes do show a moderate restrictive lung defect with air trapping. No hyperinflation. Diffusion is normal when adjusted for alveolar  ventilation. IMPRESSION:  Normal spirometry. Moderate restrictive lung defect with  air trapping and diffusion is normal when adjusted for alveolar  ventilation.   Flow-volume loops are normal.        Erica Savage MD    D: 2021 10:42:34       T: 2021 15:12:36     [email or web address]  Job#: [id number]     Doc#: [id number]    CC:

## 2021-12-30 LAB — PATHOLOGY/CYTOLOGY REPORT: NORMAL

## 2022-01-03 ENCOUNTER — TELEPHONE (OUTPATIENT)
Dept: PULMONOLOGY | Age: 77
End: 2022-01-03

## 2022-01-03 LAB — PATHOLOGY/CYTOLOGY REPORT: NORMAL

## 2022-01-04 PROCEDURE — 94729 DIFFUSING CAPACITY: CPT | Performed by: INTERNAL MEDICINE

## 2022-01-04 PROCEDURE — 94070 EVALUATION OF WHEEZING: CPT | Performed by: INTERNAL MEDICINE

## 2022-01-04 PROCEDURE — 94726 PLETHYSMOGRAPHY LUNG VOLUMES: CPT | Performed by: INTERNAL MEDICINE

## 2022-01-10 DIAGNOSIS — E03.9 ACQUIRED HYPOTHYROIDISM: ICD-10-CM

## 2022-01-10 DIAGNOSIS — E78.5 ELEVATED LIPIDS: ICD-10-CM

## 2022-01-10 RX ORDER — LEVOTHYROXINE SODIUM 0.07 MG/1
TABLET ORAL
Qty: 90 TABLET | Refills: 1 | Status: SHIPPED | OUTPATIENT
Start: 2022-01-10 | End: 2022-06-29

## 2022-01-10 RX ORDER — ATORVASTATIN CALCIUM 10 MG/1
TABLET, FILM COATED ORAL
Qty: 90 TABLET | Refills: 1 | Status: SHIPPED | OUTPATIENT
Start: 2022-01-10 | End: 2022-06-29

## 2022-01-10 NOTE — TELEPHONE ENCOUNTER
Refill request for Levothyroxine/Atorvastatin medication.      Name of [address]      Last visit - 10/12/21     Pending visit - 3/10/22    Last refill -9/9/21-7/19/21      Medication Contract signed -   Last Oarrs ran-         Additional Comments

## 2022-01-11 LAB — PATHOLOGY/CYTOLOGY REPORT: NORMAL

## 2022-01-24 DIAGNOSIS — E03.9 ACQUIRED HYPOTHYROIDISM: Primary | ICD-10-CM

## 2022-01-24 DIAGNOSIS — E78.5 DYSLIPIDEMIA: ICD-10-CM

## 2022-01-24 DIAGNOSIS — D64.9 ANEMIA, UNSPECIFIED TYPE: ICD-10-CM

## 2022-02-08 ENCOUNTER — TELEPHONE (OUTPATIENT)
Dept: PULMONOLOGY | Age: 77
End: 2022-02-08

## 2022-02-08 DIAGNOSIS — R06.02 SOB (SHORTNESS OF BREATH): Primary | ICD-10-CM

## 2022-02-08 NOTE — TELEPHONE ENCOUNTER
Patient requesting to have a chest xray prior to virtual visit tomorrow. Explained to patient that doctor is currently out of the office but if we are able to get the order prior to appt we will call her back.

## 2022-02-08 NOTE — TELEPHONE ENCOUNTER
Within this Telehealth Consent, the terms you and yours refer to the person using the Telehealth Service (Service), or in the case of a use of the Service by or on behalf of a minor, you and yours refer to and include (i) the parent or legal guardian who provides consent to the use of the Service by such minor or uses the Service on behalf of such minor, and (ii) the minor for whom consent is being provided or on whose behalf the Service is being utilized. When using Service, you will be consulting with your health care providers via the use of Telehealth.   Telehealth involves the delivery of healthcare services using electronic communications, information technology or other means between a healthcare provider and a patient who are not in the same physical location. Telehealth may be used for diagnosis, treatment, follow-up and/or patient education, and may include, but is not limited to, one or more of the following:    Electronic transmission of medical records, photo images, personal health information or other data between a patient and a healthcare provider    Interactions between a patient and healthcare provider via audio, video and/or data communications    Use of output data from medical devices, sound and video files    Anticipated Benefits   The use of Telehealth by your Provider(s) through the Service may have the following possible benefits:    Making it easier and more efficient for you to access medical care and treatment for the conditions treated by such Provider(s) utilizing the Service    Allowing you to obtain medical care and treatment by Provider(s) at times that are convenient for you    Enabling you to interact with Provider(s) without the necessity of an in-office appointment     Possible Risks   While the use of Telehealth can provide potential benefits for you, there are also potential risks associated with the use of Telehealth.  These risks include, but may not be limited to the following:    Your Provider(s) may not able to provide medical treatment for your particular condition and you may be required to seek alternative healthcare or emergency care services.  The electronic systems or other security protocols or safeguards used in the Service could fail, causing a breach of privacy of your medical or other information.  Given regulatory requirements in certain jurisdictions, your Provider(s) diagnosis and/or treatment options, especially pertaining to certain prescriptions, may be limited. Acceptance   1. You understand that Services will be provided via Telehealth. This process involves the use of HIPAA compliant and secure, real-time audio-visual interfacing with a qualified and appropriately trained provider located at [facility]. 2. You understand that, under no circumstances, will this session be recorded. 3. You understand that the Provider(s) at [facility] and other clinical participants will be party to the information obtained during the Telehealth session in accordance with best medical practices. 4. You understand that the information obtained during the Telehealth session will be used to help determine the most appropriate treatment options. 5. You understand that You have the right to revoke this consent at any point in time. 6. You understand that Telehealth is voluntary, and that continued treatment is not dependent upon consent. 7. You understand that, in the event of non-consent to Telehealth services and/or technical difficulties, you will obtain services as typically provided in the absence of Telehealth technology. 8. You understand that this consent will be kept in Your medical record. 9. No potential benefits from the use of Telehealth or specific results can be guaranteed. Your condition may not be cured or improved and, in some cases, may get worse.    10. There are limitations in the provision of medical care and treatment via Telehealth and the Service and you may not be able to receive diagnosis and/or treatment through the Service for every condition for which you seek diagnosis and/or treatment. 11. There are potential risks to the use of Telehealth, including but not limited to the risks described in this Telehealth Consent. 12. Your Provider(s) have discussed the use of Telehealth and the Service with you, including the benefits and risks of such and you have provided oral consent to your Provider(s) for the use of Telehealth and the Service. 15. You understand that it is your duty to provide your Provider(s) truthful, accurate and complete information, including all relevant information regarding care that you may have received or may be receiving from other healthcare providers outside of the Service. 14. You understand that each of your Provider(s) may determine in his or sole discretion that your condition is not suitable for diagnosis and/or treatment using the Service, and that you may need to seek medical care and treatment a specialist or other healthcare provider, outside of the Service. 15. You understand that you are fully responsible for payment for all services provided by Provider(s) or through use of the Service and that you may not be able to use third-party insurance. 16. You represent that (a) you have read this Telehealth Consent carefully, (b) you understand the risks and benefits of the Service and the use of Telehealth in the medical care and treatment provided to you by Provider(s) using the Service, and (c) you have the legal capacity and authority to provide this consent for yourself and/or the minor for which you are consenting under applicable federal and state laws, including laws relating to the age of [de-identified] and/or parental/guardian consent.    17. You give your informed consent to the use of Telehealth by Provider(s) using the Service under the terms described in the Terms of Service and this Telehealth Consent. The patient was read the following statement and has consented to the visit as of 2/8/22. The patient has been scheduled for their first telehealth visit on 02/09/22 with Dr. Terence Chavez.

## 2022-02-09 ENCOUNTER — VIRTUAL VISIT (OUTPATIENT)
Dept: INTERNAL MEDICINE CLINIC | Age: 77
End: 2022-02-09
Payer: MEDICARE

## 2022-02-09 ENCOUNTER — HOSPITAL ENCOUNTER (OUTPATIENT)
Age: 77
Discharge: HOME | End: 2022-02-09
Payer: MEDICARE

## 2022-02-09 ENCOUNTER — HOSPITAL ENCOUNTER (OUTPATIENT)
Dept: GENERAL RADIOLOGY | Age: 77
Discharge: HOME | End: 2022-02-09
Payer: MEDICARE

## 2022-02-09 ENCOUNTER — VIRTUAL VISIT (OUTPATIENT)
Dept: PULMONOLOGY | Age: 77
End: 2022-02-09
Payer: MEDICARE

## 2022-02-09 DIAGNOSIS — R06.02 SOB (SHORTNESS OF BREATH): ICD-10-CM

## 2022-02-09 DIAGNOSIS — G47.33 OSA (OBSTRUCTIVE SLEEP APNEA): ICD-10-CM

## 2022-02-09 DIAGNOSIS — R91.8 LUNG INFILTRATE ON CT: Primary | ICD-10-CM

## 2022-02-09 DIAGNOSIS — K21.9 GASTROESOPHAGEAL REFLUX DISEASE, UNSPECIFIED WHETHER ESOPHAGITIS PRESENT: ICD-10-CM

## 2022-02-09 DIAGNOSIS — Z00.00 ROUTINE GENERAL MEDICAL EXAMINATION AT A HEALTH CARE FACILITY: Primary | ICD-10-CM

## 2022-02-09 DIAGNOSIS — R05.9 COUGH: ICD-10-CM

## 2022-02-09 PROCEDURE — G8399 PT W/DXA RESULTS DOCUMENT: HCPCS | Performed by: INTERNAL MEDICINE

## 2022-02-09 PROCEDURE — G0438 PPPS, INITIAL VISIT: HCPCS | Performed by: NURSE PRACTITIONER

## 2022-02-09 PROCEDURE — 3017F COLORECTAL CA SCREEN DOC REV: CPT | Performed by: INTERNAL MEDICINE

## 2022-02-09 PROCEDURE — 4040F PNEUMOC VAC/ADMIN/RCVD: CPT | Performed by: NURSE PRACTITIONER

## 2022-02-09 PROCEDURE — 1123F ACP DISCUSS/DSCN MKR DOCD: CPT | Performed by: INTERNAL MEDICINE

## 2022-02-09 PROCEDURE — 4040F PNEUMOC VAC/ADMIN/RCVD: CPT | Performed by: INTERNAL MEDICINE

## 2022-02-09 PROCEDURE — 3017F COLORECTAL CA SCREEN DOC REV: CPT | Performed by: NURSE PRACTITIONER

## 2022-02-09 PROCEDURE — G8484 FLU IMMUNIZE NO ADMIN: HCPCS | Performed by: INTERNAL MEDICINE

## 2022-02-09 PROCEDURE — 99214 OFFICE O/P EST MOD 30 MIN: CPT | Performed by: INTERNAL MEDICINE

## 2022-02-09 PROCEDURE — G8417 CALC BMI ABV UP PARAM F/U: HCPCS | Performed by: INTERNAL MEDICINE

## 2022-02-09 PROCEDURE — 1036F TOBACCO NON-USER: CPT | Performed by: INTERNAL MEDICINE

## 2022-02-09 PROCEDURE — G8427 DOCREV CUR MEDS BY ELIG CLIN: HCPCS | Performed by: INTERNAL MEDICINE

## 2022-02-09 PROCEDURE — 1123F ACP DISCUSS/DSCN MKR DOCD: CPT | Performed by: NURSE PRACTITIONER

## 2022-02-09 PROCEDURE — 71046 X-RAY EXAM CHEST 2 VIEWS: CPT

## 2022-02-09 PROCEDURE — 1090F PRES/ABSN URINE INCON ASSESS: CPT | Performed by: INTERNAL MEDICINE

## 2022-02-09 PROCEDURE — G8484 FLU IMMUNIZE NO ADMIN: HCPCS | Performed by: NURSE PRACTITIONER

## 2022-02-09 RX ORDER — ZINC SULFATE 50(220)MG
50 CAPSULE ORAL DAILY
COMMUNITY

## 2022-02-09 RX ORDER — MULTIVIT-MIN/IRON/FOLIC ACID/K 18-600-40
CAPSULE ORAL DAILY
COMMUNITY

## 2022-02-09 ASSESSMENT — LIFESTYLE VARIABLES: HOW OFTEN DO YOU HAVE A DRINK CONTAINING ALCOHOL: 0

## 2022-02-09 ASSESSMENT — ENCOUNTER SYMPTOMS
SORE THROAT: 0
EYES NEGATIVE: 1
ALLERGIC/IMMUNOLOGIC NEGATIVE: 1
WHEEZING: 0
RHINORRHEA: 0
SHORTNESS OF BREATH: 0
COUGH: 1
HEMOPTYSIS: 0
GASTROINTESTINAL NEGATIVE: 1
HEARTBURN: 0

## 2022-02-09 ASSESSMENT — PATIENT HEALTH QUESTIONNAIRE - PHQ9
1. LITTLE INTEREST OR PLEASURE IN DOING THINGS: 0
2. FEELING DOWN, DEPRESSED OR HOPELESS: 0
SUM OF ALL RESPONSES TO PHQ QUESTIONS 1-9: 0
SUM OF ALL RESPONSES TO PHQ9 QUESTIONS 1 & 2: 0
SUM OF ALL RESPONSES TO PHQ QUESTIONS 1-9: 0

## 2022-02-09 NOTE — PROGRESS NOTES
Lesa Gamez (:  1945) is a 78 y.o. female,New patient, here for evaluation of the following chief complaint(s):  No chief complaint on file. ASSESSMENT/PLAN:  1. Lung infiltrate on CT  2. Cough  3. SOB (shortness of breath)  4. KIANNA (obstructive sleep apnea)      CT scan done 2021  Impression   Patchy multifocal bilateral consolidation, greatest within the lower lobes,   compatible with pneumonia.  Both typical and atypical etiologies, including   viral pneumonia could be considered.  Follow-up to resolution is recommended.           Chest x-ray done 2021  Impression   Interval improvement in the right basilar infiltrate, when compared to the   previous study performed 10/12/2021.  Findings indicating a superior segment   right lower lobe pneumonia.  Noncontrast CT scan of the chest can be   performed for further evaluation.  COPD again identified.       .     Chest x-ray done 10/12/2021  Impression   Right basilar airspace disease. Covid testing done on 10/7/2021, 10/12/2021, 2021-all negative        Stress test on 2021   Conclusions        Summary    Excellent functional capacity at 10 METs    Normal LV function.   Nikita Fuelling is nearly uniform isotope uptake at stress and rest. There is no clear    evidence of myocardial ischemia or scar.       Stress Protocols         Lung infiltrates  Repeat chest x-ray done 2022    Impression   Mild-to-moderate progression of bilateral pulmonary infiltrates compared to   previous exam of 2021.  Findings remain concerning for an   evolving atypical pneumonia. May need to do bronchoscopy      Cough   Will do the following    Will get   PFT and methacholine done 2021  DATE OF PROCEDURE:  2021     Full PFT done. FEV1 1.97, 78% predicted; FVC of 2.64, 79% predicted;  FEV1 to FVC ratio of 75% showing no obstructive lung defect.   Lung  volumes do show a moderate restrictive lung defect with air week      Asthma as girl  And was really bad and then out grew this    Only using albuterol         Cough- dry  Worse with   talking      Better with Perles        Sinus   No sneezing  And blowing nose  No congestion  Not thick  No real issues          Subjective:              78old year old,female, with PMH significant for cough,  that presents today for initial evaluation. Pt reports snoring for years  and that it is getting same. Pt reports does  snore moderate for years. Patient's partner does complain of pt snoring. Pt's partner does not notice that she stops breathing during sleep. Pt's  does wake   herself with dry mouth, sweating, cough, fatigue, nocturia x 2, leg cramps . Pt does report fatigue or tiredness frequently. Pt sleeps more than 7 hours, and at rare times overwhelming sleepiness attacks. Pt  Does  dozes unintentionally while watching TV . While driving  Does not feel drowsy / nod off / fall sleep at stop lights. Pt does not have h/o sleepiness associated wrecks/near wrecks. Pt does  nod off while  unattended. Pt does not report having restless legs 0 times a week. This is often accompanied by leg jerks during sleep, numbness in legs or feet, aches/burning/cramps in legs, feet. does report having nasal congestion. negative for use of nasal sprays, nose or sinus surgery. negative for broken nose, tonsillectomy, sleeping w/ chest raised. Does not sleep with oxygen. Pt does not have a dental appliance or braces on teeth. does teeth grinding. Does not report nightmares, sleep walking, dreaming during naps. When angry or laughing Narvis Blush does not report cataplexy. she does not report hallucinations when dozing off or immediately upon awakening. Does not report sleep paralysis. Does not have parasomnia. Patient's Branford Sleepiness score  is required. Patient  Is CONSISTENT with moderate daytime sleepiness.          2/9/2022    TELEHEALTH EVALUATION -- Audio/Visual (During [facility] public health emergency)        Marco Christian, was evaluated through a synchronous (real-time) audio-video encounter. The patient (or guardian if applicable) is aware that this is a billable service, which includes applicable co-pays. This Virtual Visit was conducted with patient's (and/or legal guardian's) consent. The visit was conducted pursuant to the emergency declaration under the [facility], [address] [place] [address] authority and the [employer] Act. Patient identification was verified, and a caregiver was present when appropriate. The patient was located at home in a state where the provider was licensed to provide care. Total time spent on this encounter: Not billed by time    --Sean Fleming MD on 2/9/2022 at 11:35 AM    An electronic signature was used to authenticate this note. Since the last visit the patient has been having a little less cough  Has stopped the Symbicort because it was causing more hoarseness than anything else  His use albuterol as needed  The biggest change since last time is less cough and the only significant change that I have done was increased her Prilosec from over-the-counter to the full dose of 40 mg  Patient has had an episode of aspiration just recently of an ice chip. This happens periodically  Patient has not had an evaluation by a stomach doctor at any time. Cough  This is a new problem. The current episode started more than 1 month ago. The problem has been waxing and waning. The cough is non-productive. Associated symptoms include ear pain. Pertinent negatives include no chest pain, chills, ear congestion, fever, headaches, heartburn, hemoptysis, myalgias, nasal congestion, postnasal drip, rash, rhinorrhea, sore throat, shortness of breath, sweats, weight loss or wheezing. Review of Systems   Constitutional: Negative.   Negative for chills, fever and weight loss. HENT: Positive for ear pain. Negative for postnasal drip, rhinorrhea and sore throat. Eyes: Negative. Respiratory: Positive for cough. Negative for hemoptysis, shortness of breath and wheezing. Cardiovascular: Negative. Negative for chest pain. Gastrointestinal: Negative. Negative for heartburn. Endocrine: Negative. Genitourinary: Negative. Musculoskeletal: Negative. Negative for myalgias. Skin: Negative. Negative for rash. Allergic/Immunologic: Negative. Neurological: Negative. Negative for headaches. Hematological: Negative. Psychiatric/Behavioral: Negative. Objective   Physical Exam  Vitals and nursing note reviewed. Constitutional:       General: She is not in acute distress. Appearance: Normal appearance. She is not ill-appearing. HENT:      Head: Normocephalic and atraumatic. Right Ear: External ear normal.      Left Ear: External ear normal.      Nose: Nose normal.      Mouth/Throat:      Pharynx: Oropharynx is clear. Comments: Mallampati 2  Eyes:      General: No scleral icterus. Extraocular Movements: Extraocular movements intact. Conjunctiva/sclera: Conjunctivae normal.   Pulmonary:      Effort: No respiratory distress. Musculoskeletal:         General: No swelling or tenderness. Normal range of motion. Cervical back: Normal range of motion and neck supple. No rigidity. Skin:     General: Skin is warm. Coloration: Skin is not jaundiced. Neurological:      General: No focal deficit present. Mental Status: She is alert and oriented to person, place, and time. Mental status is at baseline. Cranial Nerves: No cranial nerve deficit. Psychiatric:         Mood and Affect: Mood normal.         Thought Content:  Thought content normal.         Judgment: Judgment normal.                  An electronic signature was used to authenticate this note.    --Kiki Rojo MD

## 2022-02-09 NOTE — PATIENT INSTRUCTIONS
ASSESSMENT/PLAN:  1. Lung infiltrate on CT  2. Cough  3. SOB (shortness of breath)  4. KIANNA (obstructive sleep apnea)      CT scan done 11/19/2021  Impression   Patchy multifocal bilateral consolidation, greatest within the lower lobes,   compatible with pneumonia.  Both typical and atypical etiologies, including   viral pneumonia could be considered.  Follow-up to resolution is recommended.           Chest x-ray done 11/17/2021  Impression   Interval improvement in the right basilar infiltrate, when compared to the   previous study performed 10/12/2021.  Findings indicating a superior segment   right lower lobe pneumonia.  Noncontrast CT scan of the chest can be   performed for further evaluation.  COPD again identified.       .     Chest x-ray done 10/12/2021  Impression   Right basilar airspace disease. Covid testing done on 10/7/2021, 10/12/2021, 11/18/2021-all negative        Stress test on 7/6/2021   Conclusions        Summary    Excellent functional capacity at 10 METs    Normal LV function.   Van Payne is nearly uniform isotope uptake at stress and rest. There is no clear    evidence of myocardial ischemia or scar.       Stress Protocols         Lung infiltrates  Repeat chest x-ray done 2/9/2022    Impression   Mild-to-moderate progression of bilateral pulmonary infiltrates compared to   previous exam of December 6, 2021.  Findings remain concerning for an   evolving atypical pneumonia. May need to do bronchoscopy      Cough   Will do the following    Will get   PFT and methacholine done 12/16/2021  DATE OF PROCEDURE:  12/16/2021     Full PFT done. FEV1 1.97, 78% predicted; FVC of 2.64, 79% predicted;  FEV1 to FVC ratio of 75% showing no obstructive lung defect. Lung  volumes do show a moderate restrictive lung defect with air trapping. No hyperinflation. Diffusion is normal when adjusted for alveolar  ventilation.     IMPRESSION:  Normal spirometry.   Moderate restrictive lung defect with  air trapping and diffusion is normal when adjusted for alveolar  ventilation. Flow-volume loops are normal.        Stopped the   symbicort as this seemed not really help and was doing better  Was having some issues with hoarseness      Continue   Albuterol as needed        Cbc with diff-done 12/6/2021 with level of 200  IGG, IGA, IGM- all normal  IGE was 50  RAST done 12/6/2021 + for Alternaria alter Xi-8.88      GERD    Continue with:  prilosec 40 mg    May have issues with swallow and cough  May need evaluation by GI  Will refer to GI    Will refill  Tessolon Perles 200 mg TID as needed          I  RECOMMENDATIONS:       Advised to avoid driving when too sleepy to function safely and given a discussion of the risks of untreated apnea such as accidents, cognitive impairment, mood impairment, high blood pressure, various cardiac diseases and stroke. Continue with positional sleep therapy and using wedge at night  No need to use autopap this    RTc in 2-3 months    Remember to bring a list of pulmonary medications and any CPAP or BiPAP machines to your next appointment with the office. Please keep all of your future appointments scheduled by [facility] office. Out of respect for other patients and providers, you may be asked to reschedule your appointment if you arrive later than your scheduled appointment time. Appointments cancelled less than 24hrs in advance will be considered a no show. Patients with three missed appointments within 1 year or four missed appointments within 2 years can be dismissed from the practice. Please be aware that our physicians are required to work in the Intensive Care Unit at [facility].  Your appointment may need to be rescheduled if they are designated to work during your appointment time. You may receive a survey regarding the care you received during your visit. Your input is valuable to us.   We encourage you to

## 2022-02-09 NOTE — PATIENT INSTRUCTIONS
Personalized Preventive Plan for Jasiel Foy - 2/9/2022  Medicare offers a range of preventive health benefits. Some of the tests and screenings are paid in full while other may be subject to a deductible, co-insurance, and/or copay. Some of these benefits include a comprehensive review of your medical history including lifestyle, illnesses that may run in your family, and various assessments and screenings as appropriate. After reviewing your medical record and screening and assessments performed today your provider may have ordered immunizations, labs, imaging, and/or referrals for you. A list of these orders (if applicable) as well as your Preventive Care list are included within your After Visit Summary for your review. Other Preventive Recommendations:    · A preventive eye exam performed by an eye specialist is recommended every 1-2 years to screen for glaucoma; cataracts, macular degeneration, and other eye disorders. · A preventive dental visit is recommended every 6 months. · Try to get at least 150 minutes of exercise per week or 10,000 steps per day on a pedometer . · Order or download the FREE \"Exercise & Physical Activity: Your Everyday Guide\" from [employer]. Call 1-678.965.2998 or search [employer] online. · You need 2526-9142 mg of calcium and 7425-5191 IU of vitamin D per day. It is possible to meet your calcium requirement with diet alone, but a vitamin D supplement is usually necessary to meet this goal.  · When exposed to the sun, use a sunscreen that protects against both UVA and UVB radiation with an SPF of 30 or greater. Reapply every 2 to 3 hours or after sweating, drying off with a towel, or swimming. · Always wear a seat belt when traveling in a car. Always wear a helmet when riding a bicycle or motorcycle.

## 2022-02-09 NOTE — PROGRESS NOTES
Medicare Annual Wellness Visit  Name: Nkechi Torres Date: 2022   MRN: <[id number]> Sex: Female   Age: 78 y.o. Ethnicity: Non- / Non    : 1945 Race: White (non-)      Star Castano is here for Medicare AWV    Screenings for behavioral, psychosocial and functional/safety risks, and cognitive dysfunction are all negative except as indicated below. These results, as well as other patient data from the [address] [facility] form, are documented in Flowsheets linked to this Encounter. Allergies   Allergen Reactions    Macrobid [Nitrofurantoin Monohyd Macro] Rash    Zocor [Simvastatin]      Elevated LFTs    Sulfa Antibiotics Nausea Only and Other (See Comments)     Stomach nausea       Prior to Visit Medications    Medication Sig Taking?  Authorizing Provider   zinc sulfate (ZINCATE) 220 (50 Zn) MG capsule Take 50 mg by mouth daily Yes Historical Provider, MD   Cholecalciferol (VITAMIN D) 50 MCG (2000 UT) CAPS capsule Take by mouth Yes Historical Provider, MD   atorvastatin (LIPITOR) 10 MG tablet TAKE 1 TABLET DAILY FOR HIGH CHOLESTEROL Yes Marleni Linda MD   levothyroxine (SYNTHROID) 75 MCG tablet TAKE 1 TABLET EVERY DAY (THYROID MEDICINE) Yes Marleni Linda MD   ferrous gluconate (FERGON) 324 (38 Fe) MG tablet Take 324 mg by mouth three times a week Yes Historical Provider, MD   omeprazole (PRILOSEC) 40 MG delayed release capsule Take 1 capsule by mouth every morning (before breakfast) Yes Stephanie Simmons MD   calcium carbonate (OSCAL) 500 MG TABS tablet Take 500 mg by mouth daily Yes Historical Provider, MD   budesonide-formoterol (SYMBICORT) 160-4.5 MCG/ACT AERO Inhale 2 puffs into the lungs 2 times daily  Patient not taking: Reported on 2022  Stephanie Simmons MD   omeprazole (PRILOSEC) 20 MG delayed release capsule Take 20 mg by mouth daily  Patient not taking: Reported on 2022  Historical Provider, MD   dextromethorphan-guaiFENesin ([place] 5[address] DM)  MG per extended release tablet Take 1 tablet by mouth every 12 hours as needed  Patient not taking: Reported on 2/9/2022  Historical Provider, MD   methylPREDNISolone (MEDROL, JOSEPH,) 4 MG tablet Take by mouth. Take with food. Patient not taking: Reported on 2/9/2022  Melanie Linda MD   benzonatate (TESSALON PERLES) 100 MG capsule Take 1 capsule by mouth 3 times daily as needed for Cough  Patient not taking: Reported on 2/9/2022  Melanie Linda MD   methylPREDNISolone (MEDROL, JOSEPH,) 4 MG tablet Take by mouth. Take with food. Patient not taking: Reported on 2/9/2022  Melanie Linda, MD   albuterol sulfate HFA (PROVENTIL HFA) 108 (90 Base) MCG/ACT inhaler Inhale 2 puffs into the lungs every 6 hours as needed for Wheezing  Patient not taking: Reported on 2/9/2022  Melanie Linda MD   methylPREDNISolone (MEDROL, JOSEPH,) 4 MG tablet Take by mouth. Take with food. Patient not taking: Reported on 2/9/2022  Melanie Linda MD   ciprofloxacin (CIPRO) 500 MG tablet Take one tablet now, then half a pill twice a day until finished. Patient not taking: Reported on 2/9/2022  Melanie Linda MD       Past Medical History:   Diagnosis Date    Anemia     Anxiety     Asthma '96    PFT's    Back strain     Bell's palsy 04/2018    Rt Side    Chest pain 08/05/2016    GXT: Normal.    Chronic back pain     Cystitis     Depression     Dermatitis     Fibroids     GERD (gastroesophageal reflux disease)     Hematuria '00    Cystoscopy & IVP  (---)    History of recurrent UTIs     Hyperlipidemia     Hypothyroidism 06/05    Insomnia     Irritable bowel syndrome     Palpitations     Pneumonia 11/93       Past Surgical History:   Procedure Laterality Date    CHOLECYSTECTOMY      COLONOSCOPY  01/04    Neg.     COLONOSCOPY  10/1/14    Tubular Adenoma    COLONOSCOPY  11/07/2017     Hyperplastic Polyps ×2: Redo 5 years    CYSTOSCOPY      HYSTERECTOMY, TOTAL ABDOMINAL      TONSILLECTOMY AND ADENOIDECTOMY      TUBAL LIGATION         Family History   Problem Relation Age of Onset    Heart Disease Mother     High Blood Pressure Mother     High Cholesterol Mother     Substance Abuse Mother         Tob. use    Cancer Mother         basal cell    Emphysema Mother     Heart Failure Mother     Hypertension Mother     Diabetes Father     Alcohol Abuse Father     Arrhythmia Father     Hypertension Father     Cancer Father         bladder    Asthma Sister     Cancer Maternal Aunt     Heart Failure Maternal Grandmother     Emphysema Maternal Grandfather        CareTeam (Including outside providers/suppliers regularly involved in providing care):   Patient Care Team:  Marilin Bolton MD as PCP - General (Internal Medicine)  Marilin Bolton MD as PCP - [facility] Empaneled Provider  Mariposa Rodríguez MD as PCP - Orthopaedics (Orthopedic Surgery)    Wt Readings from Last 3 Encounters:   11/22/21 166 lb (75.3 kg)   10/12/21 165 lb (74.8 kg)   09/09/21 169 lb (76.7 kg)     Patient-Reported Vitals 2/9/2022   Patient-Reported Weight 164LB   Patient-Reported Pulse -   Patient-Reported Temperature 98.6   Patient-Reported SpO2 -      There is no height or weight on file to calculate BMI. Based upon direct observation of the patient, evaluation of cognition reveals recent and remote memory intact. Clock drawing 4/4/ Word recall 3/3        Patient's complete Health Risk Assessment and screening values have been reviewed and are found in Flowsheets. The following problems were reviewed today and where indicated follow up appointments were made and/or referrals ordered. Positive Risk Factor Screenings with Interventions:            General Health and ACP:  General  In general, how would you say your health is?: Good  In the past 7 days, have you experienced any of the following?  New or Increased Pain, New or Increased Fatigue, Loneliness, Social Isolation, Stress or Anger?: None of These  Do you get the social and emotional support that you need?: Yes  Do you have a Living Will?: Yes  Advance Directives     Power of [address] Will ACP-Advance Directive ACP-Power of     Not on File Not on File Not on File Not on File      General Health Risk Interventions:  · bring signed copy          Personalized Preventive Plan   Current Health Maintenance Status  Immunization History   Administered Date(s) Administered    COVID-19, Reyes Joslyn, Primary or Immunocompromised, PF, 100mcg/0.5mL 01/29/2021, 02/26/2021, 11/22/2021    Influenza Virus Vaccine 11/16/2006, 11/19/2007, 09/19/2013    Influenza, High-dose, Quadv, 65 yrs +, IM (Fluzone) 09/25/2020, 09/23/2021    Influenza, Triv, inactivated, subunit, adjuvanted, IM (Fluad 65 yrs and older) 10/15/2019    Pneumococcal Conjugate 13-valent (Qepbaok12) 12/29/2015    Pneumococcal Polysaccharide (Primysluv58) 07/01/2001, 06/10/2014    Td, unspecified formulation 03/01/1997    Tdap (Boostrix, Adacel) 02/19/2008, 10/18/2020        Health Maintenance   Topic Date Due    Depression Monitoring  Never done    Annual Wellness Visit (AWV)  Never done    Shingles Vaccine (1 of 2) 02/09/2023 (Originally 10/30/1997)    A1C test (Diabetic or Prediabetic)  06/30/2022    Lipid screen  09/07/2022    TSH testing  09/07/2022    Breast cancer screen  01/19/2023    Colon cancer screen colonoscopy  11/07/2027    DTaP/Tdap/Td vaccine (3 - Td or Tdap) 10/18/2030    DEXA (modify frequency per FRAX score)  Completed    Flu vaccine  Completed    Pneumococcal 65+ years Vaccine  Completed    COVID-19 Vaccine  Completed    Hepatitis C screen  Completed    Hepatitis A vaccine  Aged Out    Hepatitis B vaccine  Aged Out    Hib vaccine  Aged Out    Meningococcal (ACWY) vaccine  Aged Out     Recommendations for [employer] Due: see orders and patient instructions/AVS.  .   Recommended screening schedule for the next 5-10 years is provided to the patient in written form: see Patient Instructions/AVS.    1. Routine general medical examination at a health care facility  I have reviewed the patient's medical history in detail and updated the computerized patient record. Repeat AMW in one year    Chronic needs are followed by Dr. Paulina Henao   . Samm Allison is a 78 y.o. female being evaluated by a Virtual Visit (video and audio) encounter to address concerns as mentioned above. A caregiver was present when appropriate. Due to this being a TeleHealth encounter (During HXQ-54 public health emergency), evaluation of the following organ systems was limited: Vitals/Constitutional/EENT/Resp/CV/GI//MS/Neuro/Skin/Heme-Lymph-Imm. Pursuant to the emergency declaration under the [address] [place] [address] and the [employer] and [employer] Act, this Virtual Visit was conducted with patient's (and/or legal guardian's) consent, to reduce the patient's risk of exposure to COVID-19 and provide necessary medical care. The patient (and/or legal guardian) has also been advised to contact this office for worsening conditions or problems, and seek emergency medical treatment and/or call 911 if deemed necessary. Patient identification was verified at the start of the visit: Yes    Services were provided through a video synchronous discussion virtually to substitute for in-person clinic visit. Patient and provider were located at their individual homes. --CASI Fernandez CNP on 2/9/2022 at 3:49 PM    An electronic signature was used to authenticate this note.

## 2022-02-09 NOTE — PROGRESS NOTES
MA Communication:   The following orders are received by verbal communication from Manuel Turcios MD    Orders include:  Referral to [facility]       FU scheduled 5/11/22

## 2022-02-09 NOTE — LETTER
2/9/22        Pieter Dowling      I have seen this patient in the office today and wanted to communicate my findings and recommendations. Patient Instructions     ASSESSMENT/PLAN:  1. Lung infiltrate on CT  2. Cough  3. SOB (shortness of breath)  4. KIANNA (obstructive sleep apnea)      CT scan done 11/19/2021  Impression   Patchy multifocal bilateral consolidation, greatest within the lower lobes,   compatible with pneumonia.  Both typical and atypical etiologies, including   viral pneumonia could be considered.  Follow-up to resolution is recommended.           Chest x-ray done 11/17/2021  Impression   Interval improvement in the right basilar infiltrate, when compared to the   previous study performed 10/12/2021.  Findings indicating a superior segment   right lower lobe pneumonia.  Noncontrast CT scan of the chest can be   performed for further evaluation.  COPD again identified.       .     Chest x-ray done 10/12/2021  Impression   Right basilar airspace disease. Covid testing done on 10/7/2021, 10/12/2021, 11/18/2021all negative        Stress test on 7/6/2021   Conclusions        Summary    Excellent functional capacity at 10 METs    Normal LV function.   Katerina Mettle is nearly uniform isotope uptake at stress and rest. There is no clear    evidence of myocardial ischemia or scar.       Stress Protocols         Lung infiltrates  Repeat chest x-ray done 2/9/2022    Impression   Mild-to-moderate progression of bilateral pulmonary infiltrates compared to   previous exam of December 6, 2021.  Findings remain concerning for an   evolving atypical pneumonia. May need to do bronchoscopy      Cough   Will do the following    Will get   PFT and methacholine done 12/16/2021  DATE OF PROCEDURE:  12/16/2021     Full PFT done. FEV1 1.97, 78% predicted; FVC of 2.64, 79% predicted;  FEV1 to FVC ratio of 75% showing no obstructive lung defect.   Lung  volumes do show a moderate restrictive lung defect with air trapping. No hyperinflation. Diffusion is normal when adjusted for alveolar  ventilation.     IMPRESSION:  Normal spirometry. Moderate restrictive lung defect with  air trapping and diffusion is normal when adjusted for alveolar  ventilation. Flow-volume loops are normal.        Stopped the   symbicort as this seemed not really help and was doing better  Was having some issues with hoarseness      Continue   Albuterol as needed        Cbc with diffdone 12/6/2021 with level of 200  IGG, IGA, IGM- all normal  IGE was 50  RAST done 12/6/2021 + for Alternaria alter Xi8.88      GERD    Continue with:  prilosec 40 mg    May have issues with swallow and cough  May need evaluation by GI  Will refer to GI    Will refill  Tessolon Perles 200 mg TID as needed          I  RECOMMENDATIONS:       Advised to avoid driving when too sleepy to function safely and given a discussion of the risks of untreated apnea such as accidents, cognitive impairment, mood impairment, high blood pressure, various cardiac diseases and stroke.            Continue with positional sleep therapy and using wedge at night  No need to use autopap this    RTc in 2-3 months                   Thank you for allowing me to assist in the care of the Beau Hidalgo MD

## 2022-02-18 ENCOUNTER — HOSPITAL ENCOUNTER (OUTPATIENT)
Age: 77
Setting detail: OUTPATIENT SURGERY
Discharge: HOME | End: 2022-02-18
Attending: INTERNAL MEDICINE | Admitting: INTERNAL MEDICINE
Payer: MEDICARE

## 2022-02-18 VITALS
DIASTOLIC BLOOD PRESSURE: 69 MMHG | HEIGHT: 68 IN | SYSTOLIC BLOOD PRESSURE: 129 MMHG | TEMPERATURE: 97.9 F | HEART RATE: 71 BPM | OXYGEN SATURATION: 97 % | BODY MASS INDEX: 25.01 KG/M2 | WEIGHT: 165 LBS | RESPIRATION RATE: 16 BRPM

## 2022-02-18 DIAGNOSIS — D64.9 ANEMIA, UNSPECIFIED TYPE: ICD-10-CM

## 2022-02-18 PROCEDURE — 7100000010 HC PHASE II RECOVERY - FIRST 15 MIN: Performed by: INTERNAL MEDICINE

## 2022-02-18 PROCEDURE — 99152 MOD SED SAME PHYS/QHP 5/>YRS: CPT | Performed by: INTERNAL MEDICINE

## 2022-02-18 PROCEDURE — 2709999900 HC NON-CHARGEABLE SUPPLY: Performed by: INTERNAL MEDICINE

## 2022-02-18 PROCEDURE — 3609012400 HC EGD TRANSORAL BIOPSY SINGLE/MULTIPLE: Performed by: INTERNAL MEDICINE

## 2022-02-18 PROCEDURE — 7100000011 HC PHASE II RECOVERY - ADDTL 15 MIN: Performed by: INTERNAL MEDICINE

## 2022-02-18 PROCEDURE — 2580000003 HC RX 258: Performed by: INTERNAL MEDICINE

## 2022-02-18 PROCEDURE — 88305 TISSUE EXAM BY PATHOLOGIST: CPT

## 2022-02-18 PROCEDURE — 6360000002 HC RX W HCPCS: Performed by: INTERNAL MEDICINE

## 2022-02-18 RX ORDER — SODIUM CHLORIDE, SODIUM LACTATE, POTASSIUM CHLORIDE, CALCIUM CHLORIDE 600; 310; 30; 20 MG/100ML; MG/100ML; MG/100ML; MG/100ML
INJECTION, SOLUTION INTRAVENOUS CONTINUOUS
Status: DISCONTINUED | OUTPATIENT
Start: 2022-02-18 | End: 2022-02-18 | Stop reason: HOSPADM

## 2022-02-18 RX ORDER — MIDAZOLAM HYDROCHLORIDE 5 MG/ML
INJECTION INTRAMUSCULAR; INTRAVENOUS PRN
Status: DISCONTINUED | OUTPATIENT
Start: 2022-02-18 | End: 2022-02-18 | Stop reason: ALTCHOICE

## 2022-02-18 RX ORDER — LIDOCAINE HYDROCHLORIDE 10 MG/ML
0.1 INJECTION, SOLUTION EPIDURAL; INFILTRATION; INTRACAUDAL; PERINEURAL ONCE
Status: DISCONTINUED | OUTPATIENT
Start: 2022-02-18 | End: 2022-02-18 | Stop reason: HOSPADM

## 2022-02-18 RX ORDER — FENTANYL CITRATE 50 UG/ML
INJECTION, SOLUTION INTRAMUSCULAR; INTRAVENOUS PRN
Status: DISCONTINUED | OUTPATIENT
Start: 2022-02-18 | End: 2022-02-18 | Stop reason: ALTCHOICE

## 2022-02-18 RX ADMIN — SODIUM CHLORIDE, POTASSIUM CHLORIDE, SODIUM LACTATE AND CALCIUM CHLORIDE: 600; 310; 30; 20 INJECTION, SOLUTION INTRAVENOUS at 08:52

## 2022-02-18 ASSESSMENT — PAIN - FUNCTIONAL ASSESSMENT: PAIN_FUNCTIONAL_ASSESSMENT: 0-10

## 2022-02-18 NOTE — H&P
[facility] ENDO  Outpatient Procedure H&P    Patient: Evelyn Vides MRN: [id number]     YOB: 1945  Age: 78 y.o. Sex: female    Unit: 0 [facility] ENDO Room/Bed: [facility] Endo Pool/NONE Location: [address]     Procedure: Procedure(s):  EGD WITH IV SEDATION    Indication:aspiration pneumonia Anemia     Referring  Physician:  Zach BARROW     Brief history: frequent pneumonia    Nurses past medical history notes reviewed and agreed. Medications reviewed. Allergies: Macrobid [nitrofurantoin monohyd macro], Zocor [simvastatin], and Sulfa antibiotics     Allergies noted: Yes     Past Medical History:   Past Medical History:   Diagnosis Date    Anemia     Anxiety     Asthma '96    PFT's    Back strain     Bell's palsy 04/2018    Rt Side    Chest pain 08/05/2016    GXT: Normal.    Chronic back pain     Cystitis     Depression     Dermatitis     Fibroids     GERD (gastroesophageal reflux disease)     Hematuria '00    Cystoscopy & IVP  (---)    History of recurrent UTIs     Hyperlipidemia     Hypothyroidism 06/05    Insomnia     Irritable bowel syndrome     Palpitations     Pneumonia 11/93       Past Surgical History:   Past Surgical History:   Procedure Laterality Date    CHOLECYSTECTOMY      COLONOSCOPY  01/04    Neg.  COLONOSCOPY  10/1/14    Tubular Adenoma    COLONOSCOPY  11/07/2017     Hyperplastic Polyps ×2: Redo 5 years    CYSTOSCOPY      HYSTERECTOMY, TOTAL ABDOMINAL      TONSILLECTOMY AND ADENOIDECTOMY      TUBAL LIGATION         Social History:   Social History     Socioeconomic History    Marital status:       Spouse name: Not on file    Number of children: Not on file    Years of education: Not on file    Highest education level: Not on file   Occupational History    Not on file   Tobacco Use    Smoking status: Former Smoker     Packs/day: 0.50     Years: 6.00     Pack years: 3.00     Types: Cigarettes     Start date: 1/1/1966     Quit date: 1972     Years since quittin.1    Smokeless tobacco: Never Used   Vaping Use    Vaping Use: Never used   Substance and Sexual Activity    Alcohol use: Yes     Comment: 2 a month    Drug use: No    Sexual activity: Not on file   Other Topics Concern    Not on file   Social History Narrative    Not on file     Social Determinants of Health     Financial Resource Strain: Low Risk     Difficulty of Paying Living Expenses: Not hard at all   Food Insecurity: No Food Insecurity    Worried About Running Out of Food in the Last Year: Never true    [place] of Food in the Last Year: Never true   Transportation Needs:     Lack of Transportation (Medical): Not on file    Lack of Transportation (Non-Medical):  Not on file   Physical Activity:     Days of Exercise per Week: Not on file    Minutes of Exercise per Session: Not on file   Stress:     Feeling of Stress : Not on file   Social Connections:     Frequency of Communication with Friends and Family: Not on file    Frequency of Social Gatherings with Friends and Family: Not on file    Attends Mormonism Services: Not on file    Active Member of [address] [place] [address] [employer] or Organizations: Not on file    Attends Club or Organization Meetings: Not on file    Marital Status: Not on file   Intimate Partner Violence:     Fear of Current or Ex-Partner: Not on file    Emotionally Abused: Not on file    Physically Abused: Not on file    Sexually Abused: Not on file   Housing Stability:     Unable to Pay for Housing in the Last Year: Not on file    Number of [place] in the Last Year: Not on file    Unstable Housing in the Last Year: Not on file       Family History:   Family History   Problem Relation Age of Onset    Heart Disease Mother     High Blood Pressure Mother     High Cholesterol Mother     Substance Abuse Mother         Tob. use    Cancer Mother         basal cell    Emphysema Mother     Heart Failure Mother     Hypertension Mother     Diabetes Father     Alcohol Abuse Father     Arrhythmia Father     Hypertension Father     Cancer Father         bladder    Asthma Sister     Cancer Maternal Aunt     Heart Failure Maternal Grandmother     Emphysema Maternal Grandfather        Home Medications:   Prior to Admission medications    Medication Sig Start Date End Date Taking? Authorizing Provider   zinc sulfate (ZINCATE) 220 (50 Zn) MG capsule Take 50 mg by mouth daily   Yes Historical Provider, MD   Cholecalciferol (VITAMIN D) 50 MCG (2000 UT) CAPS capsule Take by mouth   Yes Historical Provider, MD   atorvastatin (LIPITOR) 10 MG tablet TAKE 1 TABLET DAILY FOR HIGH CHOLESTEROL 1/10/22  Yes Marleni Linda MD   levothyroxine (SYNTHROID) 75 MCG tablet TAKE 1 TABLET EVERY DAY (THYROID MEDICINE) 1/10/22  Yes Marleni Linda MD   omeprazole (PRILOSEC) 40 MG delayed release capsule Take 1 capsule by mouth every morning (before breakfast) 11/22/21  Yes Stephanie Simmons MD   albuterol sulfate HFA (PROVENTIL HFA) 108 (90 Base) MCG/ACT inhaler Inhale 2 puffs into the lungs every 6 hours as needed for Wheezing 10/22/21  Yes Marleni Linda MD   calcium carbonate (OSCAL) 500 MG TABS tablet Take 500 mg by mouth daily   Yes Historical Provider, MD   budesonide-formoterol (SYMBICORT) 160-4.5 MCG/ACT AERO Inhale 2 puffs into the lungs 2 times daily  Patient not taking: Reported on 2/9/2022 12/16/21   Stephanie Simmons MD   ferrous gluconate (FERGON) 324 (38 Fe) MG tablet Take 324 mg by mouth three times a week    Historical Provider, MD   dextromethorphan-guaiFENesin ([place] 598 DM)  MG per extended release tablet Take 1 tablet by mouth every 12 hours as needed  Patient not taking: Reported on 2/9/2022    Historical Provider, MD   benzonatate (TESSALON PERLES) 100 MG capsule Take 1 capsule by mouth 3 times daily as needed for Cough  Patient not taking: Reported on 2/9/2022 11/16/21   Marleni iLnda MD   methylPREDNISolone (MEDROL, JOSEPH,) 4 MG tablet Take by mouth.  Take with food.  Patient not taking: Reported on 2022 10/25/21   Chrissy Linda MD       Review of Systems:  Weight Loss: No  Dysphagia: No  Dyspepsia: No    Physical Exam:   Vital Signs: BP (!) 144/75   Pulse 75   Temp 97.9 °F (36.6 °C) (Temporal)   Resp 16   Ht 5' 7.5\" (1.715 m)   Wt 165 lb (74.8 kg)   SpO2 96%   BMI 25.46 kg/m²   Vital signs reviewed:Yes    HEENT:Normal  Cardiac:Normal  Chest:Normal  Abdomen:Normal  Exts: Normal  Neuro:Normal    Labs:  Hospital Outpatient Visit on 2021   Component Date Value Ref Range Status    Pathology/Cytology Report 12/10/2021    Final                    Value:             [facility]         DEPARTMENT OF PATHOLOGY               Caroline Singer. [facility]    PHONE ([phone] ([phone]    [address] REPORT    Patient Name: Gm Turner                                    Case #: [id number]    Medical Record #: [id number]                            Date of Collection: 2021  : 1945 Age: 78 Sex: FEMALE                      Date Received: 2021  Physician: Lois Guzman, [address]                         Date Reported: 12/10/2021    CLINICAL HISTORY/OPERATIVE DIAGNOSIS: R31.29, R35.1    FISH RESULTS:    NEGATIVE  0 Abnormal nuclei present    Interpretation: This result does not rule out the possibility of a papillary urothelial neoplasm of low malignant potential  or non-invasive low grade papillary urothelial carcinoma. Some patients with these tumors do not have abnormalities with  the FISH test.    Comment: Previous history on this patient of atypical                           cytology/negative FISH (2021) is noted. This test was analyzed  on the FDA approved Couple automated FISH image analysis system.     This test was performed using the OligoFISHTM probes developed by Jalyn Flowers. These probes are designed to detect aneuploidy for chromosomes 3, 6, 7 and 20 via fluorescence in situ hybridization  (FISH). This test was developed and its performance characteristics were validated by [facility] laboratory and compared with the commonly used combination of chromosomes 3, 7, 17 and  9p21 with 95% concordance over 55 samples (29 negative and 26 positive). This assay has not been cleared or approved by the FDA. This test is used for clinical purposes and should not be  regarded as investigational or for research. A positive result is indicated by the detection of four or more cells with gains of two or more of chromosomes 3, 6. 7, and 20 (polysomy) in at least 25 counted cells. A negative result is indicated when  fewer than 4 abnormal cells are                           identified in at least 25 counted cells. Sensitivity on voided urines is reported to be 33% for low grade tumors and 92% for high grade tumors. Overall specificity is 96-97%. CW:BS Marguerite Essex, MD  SPECIMEN SOURCE: Urine, voided                       Electronically Signed (12/10/2021)    GROSS DESCRIPTION:  60 cc of clear, straw fluid in \"PreservCyt\" fixative for cytology and FISH. Divided in half and two monolayer smears  prepared. See separate cytology report, [id number].     CPT CODES: [facility] Outpatient Visit on 12/06/2021   Component Date Value Ref Range Status    WBC 12/06/2021 6.7  4.0 - 11.0 K/uL Final    RBC 12/06/2021 3.91* 4.00 - 5.20 M/uL Final    Hemoglobin 12/06/2021 10.2* 12.0 - 16.0 g/dL Final    Hematocrit 12/06/2021 32.4* 36.0 - 48.0 % Final    MCV 12/06/2021 82.8  80.0 - 100.0 fL Final    MCH 12/06/2021 26.0  26.0 - 34.0 pg Final    MCHC 12/06/2021 31.4  31.0 - 36.0 g/dL Final    RDW 12/06/2021 16.5* 12.4 - 15.4 % Final    Platelets 69/20/8912 283  135 - 450 K/uL Final    MPV 12/06/2021 8.5  5.0 - 10.5 fL Final    Neutrophils % 12/06/2021 67.9  % Final    Lymphocytes % 12/06/2021 21.9  % Final    Monocytes % 12/06/2021 6.2  % Final    Eosinophils % 12/06/2021 2.6  % Final    Basophils % 12/06/2021 1.4  % Final    Neutrophils Absolute 12/06/2021 4.5  1.7 - 7.7 K/uL Final    Lymphocytes Absolute 12/06/2021 1.5  1.0 - 5.1 K/uL Final    Monocytes Absolute 12/06/2021 0.4  0.0 - 1.3 K/uL Final    Eosinophils Absolute 12/06/2021 0.2  0.0 - 0.6 K/uL Final    Basophils Absolute 12/06/2021 0.1  0.0 - 0.2 K/uL Final    IgA 12/06/2021 259.0  70.0 - 400.0 mg/dL Final    IgG 12/06/2021 858.0  700.0 - 1600.0 mg/dL Final    IgM 12/06/2021 99.0  40.0 - 230.0 mg/dL Final    IgE 12/06/2021 50  <=214 kU/L Final    IgG 4 12/06/2021 33  1 - 123 mg/dL Final    Alternaria Alternata 12/06/2021 8.88* 0.00 - 0.34 kU/L Final    Maple/Boxelder Tree 12/06/2021 <0.10  0.00 - 0.34 kU/L Final    Cat Dander Antibody 12/06/2021 <0.10  0.00 - 0.34 kU/L Final   WILL HENDRICKS [facility] 12/06/2021 <0.10  0.00 - 0.34 kU/L Final    [place] Tree 12/06/2021 <0.10  0.00 - 0.34 kU/L Final    ALLERGEN PIGWEED ROUGH IGE 12/06/2021 <0.10  0.00 - 0.34 kU/L Final    Russian Thistle 12/06/2021 <0.10  0.00 - 0.34 kU/L Final    Gabe Grass 12/06/2021 <0.10  0.00 - 0.34 kU/L Final    Allergen Hormodendrum IgE 12/06/2021 <0.10  0.00 - 0.34 kUL/L Final    Elm Tree 12/06/2021 <0.10  0.00 - 0.34 kU/L Final    [place] Tree IgE 12/06/2021 <0.10  0.00 - 0.34 kU/L Final    ALLERGEN BIRCH IgE 12/06/2021 <0.10  0.00 - 0.34 kU/L Final    Aspergillus Fumigatus 12/06/2021 <0.10  0.00 - 0.34 kU/L Final    D. pteronyssinus 12/06/2021 <0.10  0.00 - 0.34 kU/L Final    D. Farinae 12/06/2021 <0.10  0.00 - 0.34 kU/L Final    Bermuda Grass IgE 12/06/2021 <0.10  0.00 - 0.34 kU/L Final    Allergen, Tree, [place] IgE 12/06/2021 <0.10  0.00 - 0.34 kU/L Final    P.  Notatum 12/06/2021 <0.10  0.00 - 0.34 kU/L Final    Short Ragwd(A edwin.) IgE 12/06/2021 <0.10  0.00 - 0.34 kU/L Final    Cockroach IgE 12/06/2021 <0.10  0.00 - 0.34 kU/L Final    Allergen Tree [place] 12/06/2021 <0.10  0.00 - 0.34 kU/L Final    [place] Tree IgE 12/06/2021 <0.10  0.00 - 0.34 kU/L Final    Pecan Tree IgE 12/06/2021 <0.10  0.00 - 0.34 kU/L Final    Mouse Epithelial 12/06/2021 <0.10  0.00 - 0.34 kU/L Final    Mucor Racemosus 12/06/2021 <0.10  0.00 - 0.34 kU/L Final    Allergen [place] Tree, IGE 12/06/2021 <0.10  0.00 - 0.34 kU/L Final    Dog Dander IgE 12/06/2021 <0.10  0.00 - 0.34 kU/L Final    Sheep [place] IgE 12/06/2021 <0.10  0.00 - 0.34 kU/L Final    IgE 12/06/2021 82  <101 IU/mL Final        Imaging:  No orders to display       ASA:2    Mallampati Score: II    Sedation planned:IV sed. Patient in acceptable condition for procedure:Yes    9:43 AM 2/18/2022    Cinthya Melendez, DO      Please note that some or all of this record was generated using voice recognition software. If there are any questions about the content of this document, please contact the author as some errors in transcription may have occurred. The patient and I discussed that this is an elective procedure/surgery. We discussed the risks of the procedure/surgery, including but not limited to what is outlined in the signed informed consent. We also discussed the risk of abelardo COVID 19 while in the facility. We discussed the increased risk of a bad outcome should the patient contract COVID 19 during the post-procedural/post-operative period, given the patients current health condition, chronic conditions, and the added risk of COVID 19 in light of these conditions. The patient and I also discussed the risk of further postponing the procedure/surgery and other treatment alternatives, including non-procedural/surgical treatments. Understanding all of the risks, benefits, and alternatives, the patient made an informed decision to proceed with the procedure/surgery.

## 2022-02-18 NOTE — PROCEDURES
EGD PROCEDURE NOTE         Esophagogastroduodenoscopy Procedure Note     Patient: Liliana Casarez MRN: [id number]   YOB: 1945 Age: 78 y.o. Sex: female   Unit: [facility] END[facility] Room/Bed: [facility] Endo Pool/NONE Location: [address]    Admitting Physician: Emma Travis     Primary Care Physician: Macario Orlando MD      DATE OF PROCEDURE: 2/18/2022  PROCEDURE: Esophagogastroduodenoscopy  INDICATION: This is a 78y.o. year old female who presents today Anemia, Reflux  ENDOSCOPIST: Michelle Melendez DO    POSTOPERATIVE DIAGNOSIS:  2 cm Hiatal hernia  Multiple gastric polyps in the stomach suggestive of fundic gland polyps  Continue with daily proton pump inhibitor. PLAN:  await biopsy  Consider colonoscopy    INFORMED CONSENT:  Informed consent for esophagogastoduodenoscopy was obtained. The benefits and risks including adverse medicine reaction have been explained. The patient's questions were answered and the patient agreed to proceed. ASA:  ASA 2 - Patient with mild systemic disease with no functional limitations    SEDATION: MAC     The patient's vital signs, cardiac status, pulmonary status, abdominal status and mental status were stable for the procedure. The patient's vitals signs and respiratory function as monitored by oxygen saturation were stable throughout    Procedure Details: The Olympus videoendoscope was inserted into the mouth and carefully passed into the esophagus, through the stomach and to the distal duodenum. Antegrade and retrograde examination of the upper gi tract was carefully performed. Findings: The esophagus is normal.  The z-line is distinct without lesions . There is no evidence of Najera's esophagus. The scope easily passed into the stomach. The mucosa of the cardia, fundus, body showed multiple flat polypoid changes. Biopsy was obtained of these changes using a cold biopsy forceps.   The antrum of the stomach is normal.  The pylorus is patent and of normal contour. The scope easily advanced into the duodenal bulb and down to the distal duodenum.   Ante-and retrograde exam of the duodenum is normal.    Gastric or Duodenal ulcer present: No    Estimated Blood Loss: None      Signed By: Reyes Mcrae DO

## 2022-02-22 ENCOUNTER — HOSPITAL ENCOUNTER (OUTPATIENT)
Dept: GENERAL RADIOLOGY | Age: 77
Discharge: HOME | End: 2022-02-22
Payer: MEDICARE

## 2022-02-22 ENCOUNTER — HOSPITAL ENCOUNTER (OUTPATIENT)
Dept: SPEECH THERAPY | Age: 77
Setting detail: THERAPIES SERIES
Discharge: HOME | End: 2022-02-22
Payer: MEDICARE

## 2022-02-22 DIAGNOSIS — J69.0 PNEUMONITIS DUE TO INHALATION OF FOOD OR VOMITUS (HCC): ICD-10-CM

## 2022-02-22 PROCEDURE — 92611 MOTION FLUOROSCOPY/SWALLOW: CPT

## 2022-02-22 PROCEDURE — 74230 X-RAY XM SWLNG FUNCJ C+: CPT

## 2022-02-22 NOTE — PROCEDURES
[facility] THERAPY   MODIFIED BARIUM SWALLOW            Lizbeth Reason  AGE: 78 y.o. GENDER: female  : 1945  [id number]  EPISODE DATE:  2022  Ordering MD:  Ordering Physician: Dr. Mary Linda  Radiologist:  Radiologist: Dr. Ernie Wilcox  Date of Eval:  2022     Type of Study: Modified Barium Swallow    ONSET DATE: 2022  Date of Onset: 2022    MEDICAL DIAGNOSIS: Dysphagia  TREATMENT DIAGNOSIS: Oropharyngeal Dysphagia    PAST MEDICAL HISTORY   has a past medical history of Anemia, Anxiety, Asthma (), Back strain, Bell's palsy (2018), Chest pain (2016), Chronic back pain, Cystitis, Depression, Dermatitis, Fibroids, GERD (gastroesophageal reflux disease), Hematuria (), History of recurrent UTIs, Hyperlipidemia, Hypothyroidism (), Insomnia, Irritable bowel syndrome, Palpitations, and Pneumonia (). PAST SURGICAL HISTORY  Past Surgical History:   Procedure Laterality Date    CHOLECYSTECTOMY      COLONOSCOPY      Neg.  COLONOSCOPY  10/1/14    Tubular Adenoma    COLONOSCOPY  2017     Hyperplastic Polyps ×2: Redo 5 years    CYSTOSCOPY      HYSTERECTOMY, TOTAL ABDOMINAL      TONSILLECTOMY AND ADENOIDECTOMY      TUBAL LIGATION      UPPER GASTROINTESTINAL ENDOSCOPY N/A 2022    EGD BIOPSY performed by Lamonte Mclaughlin DO at [address]  Allergies   Allergen Reactions    Macrobid [Nitrofurantoin Monohyd Macro] Rash    Zocor [Simvastatin]      Elevated LFTs    Sulfa Antibiotics Nausea Only and Other (See Comments)     Stomach nausea           Subjective:     Reason for referral: Lizbeth Reason was referred for a Modified Barium Swallow study to assess  the efficiency of swallow function, rule out aspiration and make recommendations regarding safe dietary consistencies, effective compensatory strategies, and safe eating environment.     PATIENT AND FAMILY / STAFF COMPLAINTS: recurrent PNA since 10/2021    EGD completed 02/18/2022  Findings: The esophagus is normal.  The z-line is distinct without lesions . Jessica Pilon is no evidence of Najera's esophagus.  The scope easily passed into the stomach.  The mucosa of the cardia, fundus, body showed multiple flat polypoid changes. Biopsy was obtained of these changes using a cold biopsy forceps. The antrum of the stomach is normal.  The pylorus is patent and of normal contour.  The scope easily advanced into the duodenal bulb and down to the distal duodenum.  Ante-and retrograde exam of the duodenum is normal   Diet prior to study:   Current Diet Solid Consistency: Regular  Current Diet Liquid Consistency: Thin    Objective: Impressions and Recommendations     IMPRESSIONS:    1. Behavior/Cognition    Behavior/Cognition: Alert,Cooperative,Pleasant mood             2.Dysphagia Diagnosis:   Oral____Oropharyngeal_____Pharyngeal______Pharyngoesophageal ____     Oropahrygneal Swallow is WNL        3. Analysis of compensatory strategies: N/A    Pt   is independent in self-feeding and oral care. Medical record review/interview: Pt referred for MBS d/t recurrent PNA and suspected aspiration  Predisposing dysphagia risk factors: GERD  Clinical signs of possible chronic dysphagia: N/A  Precipitating dysphagia risk factors: N/A    Trials and Findings:  IDDSI 0 (thin): Assessed via tsp, cup, and straw. Normal oral phase characterized by good A-P bolus transit and timely swallow. Normal pharyngeal phase characterized by functional hyolaryngeal elevation and excursion, timely and functional epiglottic inversion and retroflexion, pharyngeal stripping wave WNL and adequate cricopharyngeal distention and duration resulting in no penetration or aspiration  IDDSI 2 (mildly thick): DNT  IDDSI 3 (moderately thick):DNT  IDDSI 4 (puree): Assessed via tsp using 5cc bolus. Normal oral phase characterized by good A-P bolus transit and timely swallow.  Normal pharyngeal phase characterized by functional hyolaryngeal elevation and excursion, timely and functional epiglottic inversion and retroflexion, pharyngeal stripping wave WNL and adequate cricopharyngeal distention and duration resulting in no aspiration or penetration  IDDSI 5 (minced and moist): DNT  IDDSI 6 (soft and bite-sized): DNT  IDDSI 7 (regular): Normal oral phase characterized by good A-P bolus transit and timely swallowNormal pharyngeal phase characterized by functional hyolaryngeal elevation and excursion, timely and functional epiglottic inversion and retroflexion, pharyngeal stripping wave WNL and adequate cricopharyngeal distention and duration resulting in no aspiration or penetration    Assessment:  Oral/pharyngeal swallow appears WNL. Swallow safety is preserved; swallow efficiency is preserved. Pt appears to be at low risk for aspiration PNA, and low risk for malnutrition/dehydration. Diet modification is not indicated. Swallow prognosis is excellent. Behavioral swallow rehabilitation is  Not warranted. Diet Recommendation: IDDSI 7 Regular Solids; IDDSI 0 Thin Liquids; meds whole with thin liquids  Risk Management: N/A  Specialist Referrals:N/A  Ancillary Tests: N/A  Goals: N/A  Follow-up exam: N/A       Dysphagia Score: Dysphagia Outcome Severity Scale: Level 7: Normal in all situations    Aspiration/Penetration Risk:   Penetration-Aspiration Scale (PAS): 1 - Material does not enter the airway    Diet Recommendations:  Solid consistency: Regular   Liquid consistency:  Thin   Medication administration: PO     Compensatory Swallow Strategies:   Compensatory Swallowing Strategies: Upright as possible for all oral intake,Remain upright for 30-45 minutes after meals    Postural Swallow Strategies:  Medication administration: PO           Therapy:  Requires SLP Intervention: No       Referrals:      Duration/Frequency of Treatment: eval only      Therapy Interventions:   Therapeutic Interventions: Patient/Family education        Goals:        Prognosis:  Prognosis for safe diet advancement: excellent     Consulted and agree with results and recommendations: Patient    Education:  Consulted and agree with results and recommendations: Patient  Patient Education: Educaiton completed  Patient Education Response: Verbalizes understanding,Demonstrated understanding      D/C Recommendations: D/C Recommendations: Home independently           Supervision: Independent    Assessment: Test Data   Pain:  Pain Assessment  Patient Currently in Pain: Denies    General  Cognitive/Behavior  Behavior/Cognition  Behavior/Cognition: Alert; Cooperative;Pleasant mood    Vision/Hearing  Vision  Vision: Within Functional Limits  Hearing  Hearing: Within functional limits      Additional assessments          Consistencies Assessed       Consistencies Administered: Reg solid,Thin teaspoon,Thin cup,Thin straw,Dysphagia Pureed (Dysphagia I)    Positioning           Indicators of Oral Phase Dysfunction   Oral Preparation / Oral Phase  Oral Phase: WNL         Indicators of Pharyngeal Phase Dysfunction  Pharyngeal Phase  Pharyngeal Phase: WNL  Pharyngeal Phase: WNL         Upper Esophageal Phase     No backflow to pharynx observed during the study              MINUTES/CHARGES  Time In: 0920  SLP Individual Minutes  Time In: 0920  Time Out: 0940  Minutes: 20  Coded treatment time        Marylou Denson M.ERLIN  [facility]  Speech-Language Pathologist    Electronically signed by ROYAL Holland on 2/22/2022 at 1:01 PM

## 2022-02-26 NOTE — RESULT ENCOUNTER NOTE
Polyps in the stomach were no significance. No findings on the upper endoscopy to explain the patient's anemia I would recommend a colonoscopy. -po

## 2022-03-04 ENCOUNTER — HOSPITAL ENCOUNTER (OUTPATIENT)
Age: 77
Discharge: HOME | End: 2022-03-04
Payer: MEDICARE

## 2022-03-04 DIAGNOSIS — D64.9 ANEMIA, UNSPECIFIED TYPE: ICD-10-CM

## 2022-03-04 DIAGNOSIS — E03.9 ACQUIRED HYPOTHYROIDISM: ICD-10-CM

## 2022-03-04 DIAGNOSIS — E78.5 DYSLIPIDEMIA: ICD-10-CM

## 2022-03-04 LAB
A/G RATIO: 1.6 (ref 1.1–2.2)
ALBUMIN SERPL-MCNC: 4.2 G/DL (ref 3.4–5)
ALP BLD-CCNC: 108 U/L (ref 40–129)
ALT SERPL-CCNC: 13 U/L (ref 10–40)
ANION GAP SERPL CALCULATED.3IONS-SCNC: 13 MMOL/L (ref 3–16)
AST SERPL-CCNC: 17 U/L (ref 15–37)
BILIRUB SERPL-MCNC: 0.3 MG/DL (ref 0–1)
BUN BLDV-MCNC: 15 MG/DL (ref 7–20)
CALCIUM SERPL-MCNC: 9.9 MG/DL (ref 8.3–10.6)
CHLORIDE BLD-SCNC: 102 MMOL/L (ref 99–110)
CHOLESTEROL, TOTAL: 179 MG/DL (ref 0–199)
CO2: 25 MMOL/L (ref 21–32)
CREAT SERPL-MCNC: 0.6 MG/DL (ref 0.6–1.2)
GFR AFRICAN AMERICAN: >60
GFR NON-AFRICAN AMERICAN: >60
GLUCOSE BLD-MCNC: 95 MG/DL (ref 70–99)
HCT VFR BLD CALC: 35.3 % (ref 36–48)
HDLC SERPL-MCNC: 88 MG/DL (ref 40–60)
HEMOGLOBIN: 11.6 G/DL (ref 12–16)
LDL CHOLESTEROL CALCULATED: 81 MG/DL
MCH RBC QN AUTO: 26.6 PG (ref 26–34)
MCHC RBC AUTO-ENTMCNC: 32.9 G/DL (ref 31–36)
MCV RBC AUTO: 80.7 FL (ref 80–100)
PDW BLD-RTO: 17.1 % (ref 12.4–15.4)
PLATELET # BLD: 284 K/UL (ref 135–450)
PMV BLD AUTO: 8.4 FL (ref 5–10.5)
POTASSIUM SERPL-SCNC: 5.6 MMOL/L (ref 3.5–5.1)
RBC # BLD: 4.38 M/UL (ref 4–5.2)
SODIUM BLD-SCNC: 140 MMOL/L (ref 136–145)
T4 TOTAL: 9.4 UG/DL (ref 4.5–10.9)
TOTAL PROTEIN: 6.8 G/DL (ref 6.4–8.2)
TRIGL SERPL-MCNC: 48 MG/DL (ref 0–150)
TSH REFLEX: 0.88 UIU/ML (ref 0.27–4.2)
VLDLC SERPL CALC-MCNC: 10 MG/DL
WBC # BLD: 6 K/UL (ref 4–11)

## 2022-03-04 PROCEDURE — 80053 COMPREHEN METABOLIC PANEL: CPT

## 2022-03-04 PROCEDURE — 80061 LIPID PANEL: CPT

## 2022-03-04 PROCEDURE — 84443 ASSAY THYROID STIM HORMONE: CPT

## 2022-03-04 PROCEDURE — 84436 ASSAY OF TOTAL THYROXINE: CPT

## 2022-03-04 PROCEDURE — 85027 COMPLETE CBC AUTOMATED: CPT

## 2022-03-04 PROCEDURE — 36415 COLL VENOUS BLD VENIPUNCTURE: CPT

## 2022-03-10 ENCOUNTER — OFFICE VISIT (OUTPATIENT)
Dept: INTERNAL MEDICINE CLINIC | Age: 77
End: 2022-03-10
Payer: MEDICARE

## 2022-03-10 VITALS
HEART RATE: 77 BPM | SYSTOLIC BLOOD PRESSURE: 132 MMHG | DIASTOLIC BLOOD PRESSURE: 68 MMHG | BODY MASS INDEX: 26.08 KG/M2 | TEMPERATURE: 97.4 F | OXYGEN SATURATION: 99 % | WEIGHT: 169 LBS

## 2022-03-10 DIAGNOSIS — D64.9 ANEMIA, UNSPECIFIED TYPE: ICD-10-CM

## 2022-03-10 DIAGNOSIS — K31.7 HYPERPLASTIC POLYPS OF STOMACH: ICD-10-CM

## 2022-03-10 DIAGNOSIS — E78.5 ELEVATED LIPIDS: ICD-10-CM

## 2022-03-10 DIAGNOSIS — E03.9 ACQUIRED HYPOTHYROIDISM: ICD-10-CM

## 2022-03-10 DIAGNOSIS — F32.4 MAJOR DEPRESSIVE DISORDER IN PARTIAL REMISSION, UNSPECIFIED WHETHER RECURRENT (HCC): ICD-10-CM

## 2022-03-10 DIAGNOSIS — J18.9 ATYPICAL PNEUMONIA: Primary | ICD-10-CM

## 2022-03-10 PROCEDURE — G8417 CALC BMI ABV UP PARAM F/U: HCPCS | Performed by: INTERNAL MEDICINE

## 2022-03-10 PROCEDURE — 3017F COLORECTAL CA SCREEN DOC REV: CPT | Performed by: INTERNAL MEDICINE

## 2022-03-10 PROCEDURE — G8484 FLU IMMUNIZE NO ADMIN: HCPCS | Performed by: INTERNAL MEDICINE

## 2022-03-10 PROCEDURE — 99215 OFFICE O/P EST HI 40 MIN: CPT | Performed by: INTERNAL MEDICINE

## 2022-03-10 PROCEDURE — 4040F PNEUMOC VAC/ADMIN/RCVD: CPT | Performed by: INTERNAL MEDICINE

## 2022-03-10 PROCEDURE — 1090F PRES/ABSN URINE INCON ASSESS: CPT | Performed by: INTERNAL MEDICINE

## 2022-03-10 PROCEDURE — G8427 DOCREV CUR MEDS BY ELIG CLIN: HCPCS | Performed by: INTERNAL MEDICINE

## 2022-03-10 PROCEDURE — 1123F ACP DISCUSS/DSCN MKR DOCD: CPT | Performed by: INTERNAL MEDICINE

## 2022-03-10 PROCEDURE — 1036F TOBACCO NON-USER: CPT | Performed by: INTERNAL MEDICINE

## 2022-03-10 PROCEDURE — G8399 PT W/DXA RESULTS DOCUMENT: HCPCS | Performed by: INTERNAL MEDICINE

## 2022-03-10 SDOH — ECONOMIC STABILITY: FOOD INSECURITY: WITHIN THE PAST 12 MONTHS, YOU WORRIED THAT YOUR FOOD WOULD RUN OUT BEFORE YOU GOT MONEY TO BUY MORE.: NEVER TRUE

## 2022-03-10 SDOH — ECONOMIC STABILITY: FOOD INSECURITY: WITHIN THE PAST 12 MONTHS, THE FOOD YOU BOUGHT JUST DIDN'T LAST AND YOU DIDN'T HAVE MONEY TO GET MORE.: NEVER TRUE

## 2022-03-10 ASSESSMENT — SOCIAL DETERMINANTS OF HEALTH (SDOH): HOW HARD IS IT FOR YOU TO PAY FOR THE VERY BASICS LIKE FOOD, HOUSING, MEDICAL CARE, AND HEATING?: NOT HARD AT ALL

## 2022-03-12 NOTE — PROGRESS NOTES
Yasmany Sat 78 y.o. 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 Atypical pneumonia             Slow improvement. MBS negative for aspiration. PFTs negative for asthma. 2. Elevated lipids                    continue to monitor  - Lipid Panel; Future  - Comprehensive Metabolic Panel; Future    3. Major depressive disorder in partial remission, unspecified whether recurrent ([address]                   baseline. 4. Anemia, unspecified type                  still needs colonoscopy  - CBC; Future    5. Acquired hypothyroidism                     continue to monitor    6. Hyperplastic polyps of stomach                          follow-up with GI                 HPI:         Review last office visit with me 10/12/2021 virtual visit. Acute bronchitis, asthma, hyperlipidemia, hypothyroid,  Labs: 3/4/2021 chemistry panel: Potassium 5.6. Lipid panel: Total cholesterol: 179. LDL cholesterol 81. T4 total: 9.4. TSH 0.88. CBC hemoglobin 11.6. Macro 35.3. This compares laboratory data 12/6/2021 hemoglobin 10.2/32. 4. Secondary to anemia underwent EGD: 2/18/2022 Dr. Hurtado: Found to hiatal hernia, multiple gastric polyps and stomach. Pathology report. Gastric polyps hyperplastic changes no dysplasia no malignancy. Speech therapy to check for aspiration: 2/22/2022 modified barium swallow negative for aspiration  Chest x-ray 2/9/2022 still positive for pneumonia. CT chest 11/19/2021 + for pneumonia bilateral lower lobes. Virtual visit 2/9/2022 pulmonary Dr. Wolfgang Lerma DX lung infiltrate, cough, shortness of breath, KIANNA ordered pulmonary function test.  But to be normal with moderate restrictive lung defect with air trapping. Urology found hematuria she reports work-up negative. 12/14/2021 Home sleep apnea study. Mild KIANNA with a AHI 5/hour.     Results for orders placed or performed during the hospital encounter of 03/04/22   Comprehensive Metabolic Panel   Result Value Ref Range    Sodium 140 136 - 145 mmol/L    Potassium 5.6 (H) 3.5 - 5.1 mmol/L    Chloride 102 99 - 110 mmol/L    CO2 25 21 - 32 mmol/L    Anion Gap 13 3 - 16    Glucose 95 70 - 99 mg/dL    BUN 15 7 - 20 mg/dL    CREATININE 0.6 0.6 - 1.2 mg/dL    GFR Non-African American >60 >60    GFR African American >60 >60    Calcium 9.9 8.3 - 10.6 mg/dL    Total Protein 6.8 6.4 - 8.2 g/dL    Albumin 4.2 3.4 - 5.0 g/dL    Albumin/Globulin Ratio 1.6 1.1 - 2.2    Total Bilirubin 0.3 0.0 - 1.0 mg/dL    Alkaline Phosphatase 108 40 - 129 U/L    ALT 13 10 - 40 U/L    AST 17 15 - 37 U/L   T4   Result Value Ref Range    T4, Total 9.4 4.5 - 10.9 ug/dL   TSH with Reflex   Result Value Ref Range    TSH 0.88 0.27 - 4.20 uIU/mL   Lipid Panel   Result Value Ref Range    Cholesterol, Total 179 0 - 199 mg/dL    Triglycerides 48 0 - 150 mg/dL    HDL 88 (H) 40 - 60 mg/dL    LDL Calculated 81 <100 mg/dL    VLDL Cholesterol Calculated 10 Not Established mg/dL   CBC   Result Value Ref Range    WBC 6.0 4.0 - 11.0 K/uL    RBC 4.38 4.00 - 5.20 M/uL    Hemoglobin 11.6 (L) 12.0 - 16.0 g/dL    Hematocrit 35.3 (L) 36.0 - 48.0 %    MCV 80.7 80.0 - 100.0 fL    MCH 26.6 26.0 - 34.0 pg    MCHC 32.9 31.0 - 36.0 g/dL    RDW 17.1 (H) 12.4 - 15.4 %    Platelets 703 988 - 239 K/uL    MPV 8.4 5.0 - 10.5 fL              ROS:  Review of Systems   Constitutional: negative   HENT: negative   EYES: negative   Respiratory: Probable viral pneumonia improving but still symptomatic mild KIANNA no need for treatment. PFTs normal with moderate restrictive lung defect with air trapping  Gastrointestinal: EGD with gastric hyperplastic polyps negative dysplasia or malignancy. .  MBS: Negative for aspiration  Endocrine: ne abrasion gative   Musculoskeletal: negative   Skin: negative   Allergic/Immunological: negative   Hematological: Anemia: Uncertain source.   Still needs colonoscopy  Psychiatric/Behavorial: negative   CV: negative   CNS: negative   : Hematuria work-up by urology was negative. S/E:Negative  Renal: Negative     Physical Exam:  Head/neck: Ears: Normal TM. No obstruction. Throat: Mask. Not examined. Thyroid not palpable. Neck: No lymphadenopathy. Eyes: EOMI, PERRLA with no nystagmus  Lungs: Moist rales at bases to inspiration. Bilateral.  CV: S1-S2 normal.   SANDOVAL murmur. Carotid: No bruit. Abdominal Examination: Bowel sounds present. Soft nontender. No mass no guarding or   rebound. Spine/extremities: No edema. No tenderness to palpation. Skin: No rash  CNS: Patient is alert, cooperative, moves all 4 limbs, ambulates without difficulty, light touch normal.   Good historian. Good orientation.      Vitals:    03/10/22 0759   BP: 132/68   Pulse: 77   Temp: 97.4 °F (36.3 °C)   SpO2: 99%        Pastor Chapo MD

## 2022-04-04 RX ORDER — OMEPRAZOLE 40 MG/1
40 CAPSULE, DELAYED RELEASE ORAL
Qty: 90 CAPSULE | Refills: 3 | Status: SHIPPED | OUTPATIENT
Start: 2022-04-04

## 2022-05-11 ENCOUNTER — OFFICE VISIT (OUTPATIENT)
Dept: PULMONOLOGY | Age: 77
End: 2022-05-11
Payer: MEDICARE

## 2022-05-11 ENCOUNTER — HOSPITAL ENCOUNTER (OUTPATIENT)
Dept: GENERAL RADIOLOGY | Age: 77
Discharge: HOME | End: 2022-05-11
Payer: MEDICARE

## 2022-05-11 VITALS
TEMPERATURE: 97.2 F | HEIGHT: 68 IN | OXYGEN SATURATION: 99 % | SYSTOLIC BLOOD PRESSURE: 103 MMHG | DIASTOLIC BLOOD PRESSURE: 61 MMHG | WEIGHT: 171 LBS | HEART RATE: 64 BPM | RESPIRATION RATE: 18 BRPM | BODY MASS INDEX: 25.91 KG/M2

## 2022-05-11 DIAGNOSIS — R91.8 LUNG INFILTRATE ON CT: ICD-10-CM

## 2022-05-11 DIAGNOSIS — R05.9 COUGH: ICD-10-CM

## 2022-05-11 DIAGNOSIS — R91.8 LUNG INFILTRATE ON CT: Primary | ICD-10-CM

## 2022-05-11 DIAGNOSIS — R06.02 SOB (SHORTNESS OF BREATH): ICD-10-CM

## 2022-05-11 DIAGNOSIS — G47.33 OSA (OBSTRUCTIVE SLEEP APNEA): ICD-10-CM

## 2022-05-11 DIAGNOSIS — K21.9 GASTROESOPHAGEAL REFLUX DISEASE, UNSPECIFIED WHETHER ESOPHAGITIS PRESENT: ICD-10-CM

## 2022-05-11 PROCEDURE — 1090F PRES/ABSN URINE INCON ASSESS: CPT | Performed by: INTERNAL MEDICINE

## 2022-05-11 PROCEDURE — G8427 DOCREV CUR MEDS BY ELIG CLIN: HCPCS | Performed by: INTERNAL MEDICINE

## 2022-05-11 PROCEDURE — 99214 OFFICE O/P EST MOD 30 MIN: CPT | Performed by: INTERNAL MEDICINE

## 2022-05-11 PROCEDURE — G8399 PT W/DXA RESULTS DOCUMENT: HCPCS | Performed by: INTERNAL MEDICINE

## 2022-05-11 PROCEDURE — 71046 X-RAY EXAM CHEST 2 VIEWS: CPT

## 2022-05-11 PROCEDURE — G8417 CALC BMI ABV UP PARAM F/U: HCPCS | Performed by: INTERNAL MEDICINE

## 2022-05-11 PROCEDURE — 1123F ACP DISCUSS/DSCN MKR DOCD: CPT | Performed by: INTERNAL MEDICINE

## 2022-05-11 PROCEDURE — 3017F COLORECTAL CA SCREEN DOC REV: CPT | Performed by: INTERNAL MEDICINE

## 2022-05-11 PROCEDURE — 1036F TOBACCO NON-USER: CPT | Performed by: INTERNAL MEDICINE

## 2022-05-11 PROCEDURE — 4040F PNEUMOC VAC/ADMIN/RCVD: CPT | Performed by: INTERNAL MEDICINE

## 2022-05-11 RX ORDER — MAGNESIUM 30 MG
30 TABLET ORAL DAILY
COMMUNITY

## 2022-05-11 ASSESSMENT — ENCOUNTER SYMPTOMS
HEMOPTYSIS: 0
GASTROINTESTINAL NEGATIVE: 1
SORE THROAT: 0
SHORTNESS OF BREATH: 0
ALLERGIC/IMMUNOLOGIC NEGATIVE: 1
WHEEZING: 0
EYES NEGATIVE: 1
COUGH: 1
RHINORRHEA: 0
HEARTBURN: 0

## 2022-05-11 NOTE — PROGRESS NOTES
Dorita Phelps (:  1945) is a 78 y.o. female,New patient, here for evaluation of the following chief complaint(s):  Follow-up (3 month ) and Cough               ASSESSMENT/PLAN:  1. Lung infiltrate on CT  2. Cough  3. KIANNA (obstructive sleep apnea)  4. SOB (shortness of breath)  5. Gastroesophageal reflux disease, unspecified whether esophagitis present      CT scan done 2021  Impression   Patchy multifocal bilateral consolidation, greatest within the lower lobes,   compatible with pneumonia.  Both typical and atypical etiologies, including   viral pneumonia could be considered.  Follow-up to resolution is recommended.           Chest x-ray done 2021  Impression   Interval improvement in the right basilar infiltrate, when compared to the   previous study performed 10/12/2021.  Findings indicating a superior segment   right lower lobe pneumonia.  Noncontrast CT scan of the chest can be   performed for further evaluation.  COPD again identified.       .     Chest x-ray done 10/12/2021  Impression   Right basilar airspace disease. Covid testing done on 10/7/2021, 10/12/2021, 2021-all negative        Stress test on 2021   Conclusions        Summary    Excellent functional capacity at 10 METs    Normal LV function.   Noreene Shouts is nearly uniform isotope uptake at stress and rest. There is no clear    evidence of myocardial ischemia or scar.       Stress Protocols         Lung infiltrates  Repeat chest x-ray done 2022    Impression   Mild-to-moderate progression of bilateral pulmonary infiltrates compared to   previous exam of 2021.  Findings remain concerning for an   evolving atypical pneumonia. Will get cxr today to see what is going on   If better, then will watch as needed    If same, will follow    If worse, will consider  bronchoscopy? Cough   Work up      PFT and methacholine done 2021  DATE OF PROCEDURE:  2021     Full PFT done.   FEV1 1.97, 78% predicted; FVC of 2.64, 79% predicted;  FEV1 to FVC ratio of 75% showing no obstructive lung defect. Lung  volumes do show a moderate restrictive lung defect with air trapping. No hyperinflation. Diffusion is normal when adjusted for alveolar  ventilation.     IMPRESSION:  Normal spirometry. Moderate restrictive lung defect with  air trapping and diffusion is normal when adjusted for alveolar  ventilation. Flow-volume loops are normal.        Stopped the   symbicort as this seemed not really help and was doing better  Was having some issues with hoarseness      Continue   Albuterol as needed  Tessolon Perles 200 mg TID as needed          Cbc with diff-done 12/6/2021 with level of 200  IGG, IGA, IGM- all normal  IGE was 50  RAST done 12/6/2021 + for Alternaria alter Xi-8.88      GERD    Continue with:  prilosec 40 mg    Patient was seen by Dr. Rachael Hernandez, of GI  EGD done on 2/18/2022 showing multiple gastric polyps, 2 cm hiatal hernia  Polyps pathology was hyperplastic changes, no [place] plastic or dysplastic or malignancy changes            I  RECOMMENDATIONS:       Advised to avoid driving when too sleepy to function safely and given a discussion of the risks of untreated apnea such as accidents, cognitive impairment, mood impairment, high blood pressure, various cardiac diseases and stroke. Continue with positional sleep therapy and using wedge at night  No need to use autopap this          COVID x2 and booster    RTc in 6  months        No follow-ups on file. I have personally reviewed and summarized the old records   I have independently reviewed the images and reviewed with patient    I have reviewed the lab tests, radiology reports and medications      Reviewed present meds and side effects. Continue present meds. Stay compliant. Call if worsens. Reviewed proper inhaler usage        Subjective   SUBJECTIVE/OBJECTIVE:  Consult from Dr. Lise Hernandez  For recurrent pneumonia  Initially seen 11/22/2021      Started in 10/2021   Was feeling bad, and chest heavy and coughing  And was tested for covid and felt congested  And then seen by Dr. Ascencion Cortes  And sounded like pna  Was on abx and cough med  And steroids    Oxygen level was low  And went to ER  And then done cxr  And not getting better  Was told it was pna  And continued what Dr. Ascencion Cortes given      However over the past month  Feeling worse  And then called pcp  And started back on abx and steroids  And given albuterol prn        Seen by urologist  And was to have rales on both sides by the anesthesiologist  And then had cxr and then had ct scan   And then did blood work      And then last week      Asthma as girl  And was really bad and then out grew this    Only using albuterol         Cough- dry  Worse with   talking      Better with Perles        Sinus   No sneezing  And blowing nose  No congestion  Not thick  No real issues          Subjective:              78old year old,female, with PMH significant for cough,  that presents today for initial evaluation. Pt reports snoring for years  and that it is getting same. Pt reports does  snore moderate for years. Patient's partner does complain of pt snoring. Pt's partner does not notice that she stops breathing during sleep. Pt's  does wake   herself with dry mouth, sweating, cough, fatigue, nocturia x 2, leg cramps . Pt does report fatigue or tiredness frequently. Pt sleeps more than 7 hours, and at rare times overwhelming sleepiness attacks. Pt  Does  dozes unintentionally while watching TV . While driving  Does not feel drowsy / nod off / fall sleep at stop lights. Pt does not have h/o sleepiness associated wrecks/near wrecks. Pt does  nod off while  unattended. Pt does not report having restless legs 0 times a week. This is often accompanied by leg jerks during sleep, numbness in legs or feet, aches/burning/cramps in legs, feet. does report having nasal congestion.   negative for use of nasal sprays, nose or sinus surgery. negative for broken nose, tonsillectomy, sleeping w/ chest raised. Does not sleep with oxygen. Pt does not have a dental appliance or braces on teeth. does teeth grinding. Does not report nightmares, sleep walking, dreaming during naps. When angry or laughing [employer] does not report cataplexy. she does not report hallucinations when dozing off or immediately upon awakening. Does not report sleep paralysis. Does not have parasomnia. Patient's [employer] Sleepiness score  is required. Patient  Is CONSISTENT with moderate daytime sleepiness. evaluation by a stomach doctor at any time. Overall has been doing well  Breathing has been good  And cough is better    Still waking up at night with dry mouth  Using wedge  Insomnia since  passing  More issue of depression      Very active  No sob  And cutting grass            Cough  This is a new problem. The current episode started more than 1 month ago. The problem has been waxing and waning. The cough is non-productive. Associated symptoms include ear pain. Pertinent negatives include no chest pain, chills, ear congestion, fever, headaches, heartburn, hemoptysis, myalgias, nasal congestion, postnasal drip, rash, rhinorrhea, sore throat, shortness of breath, sweats, weight loss or wheezing. Review of Systems   Constitutional: Negative. Negative for chills, fever and weight loss. HENT: Positive for ear pain. Negative for postnasal drip, rhinorrhea and sore throat. Eyes: Negative. Respiratory: Positive for cough. Negative for hemoptysis, shortness of breath and wheezing. Cardiovascular: Negative. Negative for chest pain. Gastrointestinal: Negative. Negative for heartburn. Endocrine: Negative. Genitourinary: Negative. Musculoskeletal: Negative. Negative for myalgias. Skin: Negative. Negative for rash. Allergic/Immunologic: Negative. Neurological: Negative. Negative for headaches. Hematological: Negative. Psychiatric/Behavioral: Negative. vitamin  Vitals:    05/11/22 0842   BP: 103/61   Pulse: 64   Resp: 18   Temp: 97.2 °F (36.2 °C)   TempSrc: Temporal   SpO2: 99%   Weight: 171 lb (77.6 kg)   Height: 5' 7.5\" (1.715 m)       Objective   Physical Exam  Vitals and nursing note reviewed. Constitutional:       General: She is not in acute distress. Appearance: Normal appearance. She is not ill-appearing. HENT:      Head: Normocephalic and atraumatic. Right Ear: External ear normal.      Left Ear: External ear normal.      Nose: Nose normal.      Mouth/Throat:      Mouth: Mucous membranes are moist.      Pharynx: Oropharynx is clear. Comments: Mallampati 2  Eyes:      General: No scleral icterus. Extraocular Movements: Extraocular movements intact. Conjunctiva/sclera: Conjunctivae normal.      Pupils: Pupils are equal, round, and reactive to light. Cardiovascular:      Rate and Rhythm: Normal rate and regular rhythm. Pulses: Normal pulses. Heart sounds: Normal heart sounds. No murmur heard. No friction rub. Pulmonary:      Effort: No respiratory distress. Breath sounds: No stridor. No rhonchi. Comments: Crackles intermittent at the right base  Abdominal:      General: Abdomen is flat. Bowel sounds are normal. There is no distension. Tenderness: There is no abdominal tenderness. There is no guarding. Musculoskeletal:         General: No swelling or tenderness. Normal range of motion. Cervical back: Normal range of motion and neck supple. No rigidity. Skin:     General: Skin is warm and dry. Coloration: Skin is not jaundiced. Neurological:      General: No focal deficit present. Mental Status: She is alert and oriented to person, place, and time. Mental status is at baseline. Cranial Nerves: No cranial nerve deficit. Sensory: No sensory deficit. Motor: No weakness. Gait: Gait normal.   Psychiatric:         Mood and Affect: Mood normal.         Thought Content:  Thought content normal.         Judgment: Judgment normal.                  An electronic signature was used to authenticate this note.    --Mikayla Ingram MD

## 2022-05-11 NOTE — LETTER
5/11/22        Octavio Cordova      I have seen this patient in the office today and wanted to communicate my findings and recommendations. Patient Instructions            ASSESSMENT/PLAN:  1. Lung infiltrate on CT  2. Cough  3. KIANNA (obstructive sleep apnea)  4. SOB (shortness of breath)  5. Gastroesophageal reflux disease, unspecified whether esophagitis present      CT scan done 11/19/2021  Impression   Patchy multifocal bilateral consolidation, greatest within the lower lobes,   compatible with pneumonia.  Both typical and atypical etiologies, including   viral pneumonia could be considered.  Follow-up to resolution is recommended.           Chest x-ray done 11/17/2021  Impression   Interval improvement in the right basilar infiltrate, when compared to the   previous study performed 10/12/2021.  Findings indicating a superior segment   right lower lobe pneumonia.  Noncontrast CT scan of the chest can be   performed for further evaluation.  COPD again identified.       .     Chest x-ray done 10/12/2021  Impression   Right basilar airspace disease. Covid testing done on 10/7/2021, 10/12/2021, 11/18/2021all negative        Stress test on 7/6/2021   Conclusions        Summary    Excellent functional capacity at 10 METs    Normal LV function.   Sean Allen is nearly uniform isotope uptake at stress and rest. There is no clear    evidence of myocardial ischemia or scar.       Stress Protocols         Lung infiltrates  Repeat chest x-ray done 2/9/2022    Impression   Mild-to-moderate progression of bilateral pulmonary infiltrates compared to   previous exam of December 6, 2021.  Findings remain concerning for an   evolving atypical pneumonia. Will get cxr today to see what is going on   If better, then will watch as needed    If same, will follow    If worse, will consider  bronchoscopy? Cough   Work up      PFT and methacholine done 12/16/2021  DATE OF PROCEDURE:  12/16/2021     Full PFT done.   FEV1 1.97, 78% predicted; FVC of 2.64, 79% predicted;  FEV1 to FVC ratio of 75% showing no obstructive lung defect. Lung  volumes do show a moderate restrictive lung defect with air trapping. No hyperinflation. Diffusion is normal when adjusted for alveolar  ventilation.     IMPRESSION:  Normal spirometry. Moderate restrictive lung defect with  air trapping and diffusion is normal when adjusted for alveolar  ventilation. Flow-volume loops are normal.        Stopped the   symbicort as this seemed not really help and was doing better  Was having some issues with hoarseness      Continue   Albuterol as needed  Tessolon Perles 200 mg TID as needed          Cbc with diffdone 12/6/2021 with level of 200  IGG, IGA, IGM- all normal  IGE was 50  RAST done 12/6/2021 + for Alternaria alter Xi8.88      GERD    Continue with:  prilosec 40 mg    Patient was seen by Dr. Jessi Greco, of GI  EGD done on 2/18/2022 showing multiple gastric polyps, 2 cm hiatal hernia  Polyps pathology was hyperplastic changes, no [place] plastic or dysplastic or malignancy changes            I  RECOMMENDATIONS:       Advised to avoid driving when too sleepy to function safely and given a discussion of the risks of untreated apnea such as accidents, cognitive impairment, mood impairment, high blood pressure, various cardiac diseases and stroke.            Continue with positional sleep therapy and using wedge at night  No need to use autopap this          COVID x2 and booster    RTc in 6  months                     Thank you for allowing me to assist in the care of the Ely Marcus MD

## 2022-05-11 NOTE — PATIENT INSTRUCTIONS
ASSESSMENT/PLAN:  1. Lung infiltrate on CT  2. Cough  3. KIANNA (obstructive sleep apnea)  4. SOB (shortness of breath)  5. Gastroesophageal reflux disease, unspecified whether esophagitis present      CT scan done 11/19/2021  Impression   Patchy multifocal bilateral consolidation, greatest within the lower lobes,   compatible with pneumonia.  Both typical and atypical etiologies, including   viral pneumonia could be considered.  Follow-up to resolution is recommended.           Chest x-ray done 11/17/2021  Impression   Interval improvement in the right basilar infiltrate, when compared to the   previous study performed 10/12/2021.  Findings indicating a superior segment   right lower lobe pneumonia.  Noncontrast CT scan of the chest can be   performed for further evaluation.  COPD again identified.       .     Chest x-ray done 10/12/2021  Impression   Right basilar airspace disease. Covid testing done on 10/7/2021, 10/12/2021, 11/18/2021-all negative        Stress test on 7/6/2021   Conclusions        Summary    Excellent functional capacity at 10 METs    Normal LV function.   Brynn Shivam is nearly uniform isotope uptake at stress and rest. There is no clear    evidence of myocardial ischemia or scar.       Stress Protocols         Lung infiltrates  Repeat chest x-ray done 2/9/2022    Impression   Mild-to-moderate progression of bilateral pulmonary infiltrates compared to   previous exam of December 6, 2021.  Findings remain concerning for an   evolving atypical pneumonia. Will get cxr today to see what is going on   If better, then will watch as needed    If same, will follow    If worse, will consider  bronchoscopy? Cough   Work up      PFT and methacholine done 12/16/2021  DATE OF PROCEDURE:  12/16/2021     Full PFT done. FEV1 1.97, 78% predicted; FVC of 2.64, 79% predicted;  FEV1 to FVC ratio of 75% showing no obstructive lung defect.   Lung  volumes do show a moderate restrictive lung defect with air trapping. No hyperinflation. Diffusion is normal when adjusted for alveolar  ventilation.     IMPRESSION:  Normal spirometry. Moderate restrictive lung defect with  air trapping and diffusion is normal when adjusted for alveolar  ventilation. Flow-volume loops are normal.        Stopped the   symbicort as this seemed not really help and was doing better  Was having some issues with hoarseness      Continue   Albuterol as needed  Tessolon Perles 200 mg TID as needed          Cbc with diff-done 12/6/2021 with level of 200  IGG, IGA, IGM- all normal  IGE was 50  RAST done 12/6/2021 + for Alternaria alter Xi-8.88      GERD    Continue with:  prilosec 40 mg    Patient was seen by Dr. Jesus Miller, of GI  EGD done on 2/18/2022 showing multiple gastric polyps, 2 cm hiatal hernia  Polyps pathology was hyperplastic changes, no [place] plastic or dysplastic or malignancy changes            I  RECOMMENDATIONS:       Advised to avoid driving when too sleepy to function safely and given a discussion of the risks of untreated apnea such as accidents, cognitive impairment, mood impairment, high blood pressure, various cardiac diseases and stroke. Continue with positional sleep therapy and using wedge at night  No need to use autopap this          COVID x2 and booster    RTc in 6  months    Remember to bring a list of pulmonary medications and any CPAP or BiPAP machines to your next appointment with the office. Please keep all of your future appointments scheduled by [address]. Out of respect for other patients and providers, you may be asked to reschedule your appointment if you arrive later than your scheduled appointment time. Appointments cancelled less than 24hrs in advance will be considered a no show. Patients with three missed appointments within 1 year or four missed appointments within 2 years can be dismissed from the practice.      Please be aware that our physicians are required to work in the Intensive Care Unit at [facility].  Your appointment may need to be rescheduled if they are designated to work during your appointment time. You may receive a survey regarding the care you received during your visit. Your input is valuable to us. We encourage you to complete and return your survey. We hope you will choose us in the future for your healthcare needs. Pt instructed of all future appointment dates & times, including radiology, labs, procedures & referrals. If procedures were scheduled preparation instructions provided. Instructions on future appointments with [facility] were given.

## 2022-05-11 NOTE — PROGRESS NOTES
MA Communication:   The following orders are received by verbal communication from Wendy Manjarrez MD    Orders include:  CXR today       6 mo fu scheduled 11/16/22

## 2022-05-24 ENCOUNTER — ANESTHESIA EVENT (OUTPATIENT)
Dept: ENDOSCOPY | Age: 77
End: 2022-05-24
Payer: MEDICARE

## 2022-05-24 ENCOUNTER — HOSPITAL ENCOUNTER (OUTPATIENT)
Age: 77
Setting detail: OUTPATIENT SURGERY
Discharge: HOME | End: 2022-05-24
Attending: INTERNAL MEDICINE | Admitting: INTERNAL MEDICINE
Payer: MEDICARE

## 2022-05-24 ENCOUNTER — ANESTHESIA (OUTPATIENT)
Dept: ENDOSCOPY | Age: 77
End: 2022-05-24
Payer: MEDICARE

## 2022-05-24 VITALS
HEIGHT: 68 IN | SYSTOLIC BLOOD PRESSURE: 120 MMHG | DIASTOLIC BLOOD PRESSURE: 96 MMHG | HEART RATE: 65 BPM | WEIGHT: 165 LBS | TEMPERATURE: 96.7 F | OXYGEN SATURATION: 100 % | RESPIRATION RATE: 16 BRPM | BODY MASS INDEX: 25.01 KG/M2

## 2022-05-24 DIAGNOSIS — D50.9 IRON DEFICIENCY ANEMIA, UNSPECIFIED IRON DEFICIENCY ANEMIA TYPE: ICD-10-CM

## 2022-05-24 DIAGNOSIS — Z86.010 HISTORY OF COLON POLYPS: ICD-10-CM

## 2022-05-24 PROCEDURE — 7100000011 HC PHASE II RECOVERY - ADDTL 15 MIN: Performed by: INTERNAL MEDICINE

## 2022-05-24 PROCEDURE — 6360000002 HC RX W HCPCS: Performed by: NURSE ANESTHETIST, CERTIFIED REGISTERED

## 2022-05-24 PROCEDURE — 3609010600 HC COLONOSCOPY POLYPECTOMY SNARE/COLD BIOPSY: Performed by: INTERNAL MEDICINE

## 2022-05-24 PROCEDURE — 2709999900 HC NON-CHARGEABLE SUPPLY: Performed by: INTERNAL MEDICINE

## 2022-05-24 PROCEDURE — 2500000003 HC RX 250 WO HCPCS: Performed by: NURSE ANESTHETIST, CERTIFIED REGISTERED

## 2022-05-24 PROCEDURE — 2580000003 HC RX 258: Performed by: INTERNAL MEDICINE

## 2022-05-24 PROCEDURE — 3700000001 HC ADD 15 MINUTES (ANESTHESIA): Performed by: INTERNAL MEDICINE

## 2022-05-24 PROCEDURE — 7100000010 HC PHASE II RECOVERY - FIRST 15 MIN: Performed by: INTERNAL MEDICINE

## 2022-05-24 PROCEDURE — 3700000000 HC ANESTHESIA ATTENDED CARE: Performed by: INTERNAL MEDICINE

## 2022-05-24 PROCEDURE — 88305 TISSUE EXAM BY PATHOLOGIST: CPT

## 2022-05-24 RX ORDER — LIDOCAINE HYDROCHLORIDE 10 MG/ML
0.1 INJECTION, SOLUTION EPIDURAL; INFILTRATION; INTRACAUDAL; PERINEURAL
Status: DISCONTINUED | OUTPATIENT
Start: 2022-05-24 | End: 2022-05-24 | Stop reason: HOSPADM

## 2022-05-24 RX ORDER — SODIUM CHLORIDE, SODIUM LACTATE, POTASSIUM CHLORIDE, CALCIUM CHLORIDE 600; 310; 30; 20 MG/100ML; MG/100ML; MG/100ML; MG/100ML
INJECTION, SOLUTION INTRAVENOUS ONCE
Status: COMPLETED | OUTPATIENT
Start: 2022-05-24 | End: 2022-05-24

## 2022-05-24 RX ORDER — PROPOFOL 10 MG/ML
INJECTION, EMULSION INTRAVENOUS PRN
Status: DISCONTINUED | OUTPATIENT
Start: 2022-05-24 | End: 2022-05-24 | Stop reason: SDUPTHER

## 2022-05-24 RX ORDER — LIDOCAINE HYDROCHLORIDE 20 MG/ML
INJECTION, SOLUTION INFILTRATION; PERINEURAL PRN
Status: DISCONTINUED | OUTPATIENT
Start: 2022-05-24 | End: 2022-05-24 | Stop reason: SDUPTHER

## 2022-05-24 RX ADMIN — PROPOFOL 20 MG: 10 INJECTION, EMULSION INTRAVENOUS at 13:20

## 2022-05-24 RX ADMIN — LIDOCAINE HYDROCHLORIDE 60 MG: 20 INJECTION, SOLUTION INFILTRATION; PERINEURAL at 13:15

## 2022-05-24 RX ADMIN — PROPOFOL 10 MG: 10 INJECTION, EMULSION INTRAVENOUS at 13:17

## 2022-05-24 RX ADMIN — PROPOFOL 20 MG: 10 INJECTION, EMULSION INTRAVENOUS at 13:33

## 2022-05-24 RX ADMIN — PROPOFOL 20 MG: 10 INJECTION, EMULSION INTRAVENOUS at 13:35

## 2022-05-24 RX ADMIN — PROPOFOL 10 MG: 10 INJECTION, EMULSION INTRAVENOUS at 13:24

## 2022-05-24 RX ADMIN — PROPOFOL 10 MG: 10 INJECTION, EMULSION INTRAVENOUS at 13:37

## 2022-05-24 RX ADMIN — PROPOFOL 10 MG: 10 INJECTION, EMULSION INTRAVENOUS at 13:30

## 2022-05-24 RX ADMIN — PROPOFOL 10 MG: 10 INJECTION, EMULSION INTRAVENOUS at 13:26

## 2022-05-24 RX ADMIN — PROPOFOL 20 MG: 10 INJECTION, EMULSION INTRAVENOUS at 13:28

## 2022-05-24 RX ADMIN — SODIUM CHLORIDE, POTASSIUM CHLORIDE, SODIUM LACTATE AND CALCIUM CHLORIDE: 600; 310; 30; 20 INJECTION, SOLUTION INTRAVENOUS at 12:28

## 2022-05-24 RX ADMIN — PROPOFOL 10 MG: 10 INJECTION, EMULSION INTRAVENOUS at 13:31

## 2022-05-24 RX ADMIN — PROPOFOL 30 MG: 10 INJECTION, EMULSION INTRAVENOUS at 13:15

## 2022-05-24 RX ADMIN — PROPOFOL 10 MG: 10 INJECTION, EMULSION INTRAVENOUS at 13:23

## 2022-05-24 RX ADMIN — PROPOFOL 10 MG: 10 INJECTION, EMULSION INTRAVENOUS at 13:21

## 2022-05-24 ASSESSMENT — PAIN SCALES - GENERAL: PAINLEVEL_OUTOF10: 0

## 2022-05-24 NOTE — H&P
[address]   Pre-operative History and Physical    Patient: Sharee Youngblood  : 1945  Acct#:     HISTORY OF PRESENT ILLNESS:    The patient is a 78 y.o. female who presents for with anemia and unremarkable upper endoscopy by Dr. Rasta Beaulieu. Colonoscopy to assess    Indications: anemia     Past Medical History:        Diagnosis Date    Anemia     Anxiety     Arthritis     Asthma     PFT's    Back strain     Bell's palsy 2018    Rt Side    Chest pain 2016    GXT: Normal.    Chronic back pain     Cystitis     Depression     Dermatitis     Fibroids     GERD (gastroesophageal reflux disease)     Hematuria '    Cystoscopy & IVP  (---)    History of recurrent UTIs     Hyperlipidemia     Hypothyroidism     Insomnia     Irritable bowel syndrome     Palpitations     Pneumonia     Sleep apnea     mild-uses a wedge at night      Past Surgical History:        Procedure Laterality Date    CHOLECYSTECTOMY      COLONOSCOPY      Neg.  COLONOSCOPY  10/1/14    Tubular Adenoma    COLONOSCOPY  2017     Hyperplastic Polyps ×2: Redo 5 years    CYSTOSCOPY      HYSTERECTOMY, TOTAL ABDOMINAL      TONSILLECTOMY AND ADENOIDECTOMY      TUBAL LIGATION      UPPER GASTROINTESTINAL ENDOSCOPY N/A 2022    EGD BIOPSY performed by Aparna Huerta DO at [address]      Medications Prior to Admission:   No current facility-administered medications on file prior to encounter.      Current Outpatient Medications on File Prior to Encounter   Medication Sig Dispense Refill    Biotin w/ Vitamins C & E (HAIR/SKIN/NAILS PO) Take by mouth daily      omeprazole (PRILOSEC) 40 MG delayed release capsule TAKE 1 CAPSULE BY MOUTH EVERY MORNING (BEFORE BREAKFAST) 90 capsule 3    zinc sulfate (ZINCATE) 220 (50 Zn) MG capsule Take 50 mg by mouth daily      Cholecalciferol (VITAMIN D) 50 MCG ( UT) CAPS capsule Take by mouth daily       atorvastatin (LIPITOR) 10 MG tablet TAKE 1 TABLET DAILY FOR HIGH CHOLESTEROL 90 tablet 1    levothyroxine (SYNTHROID) 75 MCG tablet TAKE 1 TABLET EVERY DAY (THYROID MEDICINE) 90 tablet 1    dextromethorphan-guaiFENesin (MUCINEX DM)  MG per extended release tablet Take 1 tablet by mouth every 12 hours as needed       benzonatate (TESSALON PERLES) 100 MG capsule Take 1 capsule by mouth 3 times daily as needed for Cough 15 capsule 0    albuterol sulfate HFA (PROVENTIL HFA) 108 (90 Base) MCG/ACT inhaler Inhale 2 puffs into the lungs every 6 hours as needed for Wheezing 18 g 1    calcium carbonate (OSCAL) 500 MG TABS tablet Take 500 mg by mouth daily          Allergies:  Macrobid [nitrofurantoin monohyd macro], Zocor [simvastatin], and Sulfa antibiotics    Social History:   Social History     Socioeconomic History    Marital status:      Spouse name: Not on file    Number of children: Not on file    Years of education: Not on file    Highest education level: Not on file   Occupational History    Not on file   Tobacco Use    Smoking status: Former Smoker     Packs/day: 0.50     Years: 6.00     Pack years: 3.00     Types: Cigarettes     Start date: 1966     Quit date: 1972     Years since quittin.4    Smokeless tobacco: Never Used   Vaping Use    Vaping Use: Never used   Substance and Sexual Activity    Alcohol use: Yes     Comment: 2 drinks per month    Drug use: No    Sexual activity: Not on file   Other Topics Concern    Not on file   Social History Narrative    Not on file     Social Determinants of Health     Financial Resource Strain: Low Risk     Difficulty of Paying Living Expenses: Not hard at all   Food Insecurity: No Food Insecurity    Worried About [address] in the Last Year: Never true    [facility] in the Last Year: Never true   Transportation Needs:     Lack of Transportation (Medical): Not on file    Lack of Transportation (Non-Medical):  Not on file   Physical Activity:  Days of Exercise per Week: Not on file    Minutes of Exercise per Session: Not on file   Stress:     Feeling of Stress : Not on file   Social Connections:     Frequency of Communication with Friends and Family: Not on file    Frequency of Social Gatherings with Friends and Family: Not on file    Attends Buddhism Services: Not on file    Active Member of Clubs or Organizations: Not on file    Attends Club or Organization Meetings: Not on file    Marital Status: Not on file   Intimate Partner Violence:     Fear of Current or Ex-Partner: Not on file    Emotionally Abused: Not on file    Physically Abused: Not on file    Sexually Abused: Not on file   Housing Stability:     Unable to Pay for Housing in the Last Year: Not on file    Number of [place] in the Last Year: Not on file    Unstable Housing in the Last Year: Not on file      Family History:       Problem Relation Age of Onset    Heart Disease Mother     High Blood Pressure Mother     High Cholesterol Mother     Substance Abuse Mother         Tob. use    Cancer Mother         basal cell    Emphysema Mother     Heart Failure Mother     Hypertension Mother     Diabetes Father     Alcohol Abuse Father     Arrhythmia Father     Hypertension Father     Cancer Father         bladder    Asthma Sister     Cancer Maternal Aunt     Heart Failure Maternal Grandmother     Emphysema Maternal Grandfather         PHYSICAL EXAM:      /66   Pulse 76   Temp 96.7 °F (35.9 °C) (Temporal)   Resp 18   Ht 5' 7.5\" (1.715 m)   Wt 165 lb (74.8 kg)   SpO2 100%   BMI 25.46 kg/m²  I        Heart:  Normal apical impulse, regular rate and rhythm, normal S1 and S2, no S3 or S4, and no murmur noted    Lungs:  No increased work of breathing, good air exchange, clear to auscultation bilaterally, no crackles or wheezing    Abdomen:  No scars, normal bowel sounds, soft, non-distended, non-tender, no masses palpated, no hepatosplenomegally      ASA Class  ASA 3 - Patient with moderate systemic disease with functional limitations    Mallampati Class: 3      ASSESSMENT AND PLAN:    1. Patient is a suitable candidate for endoscopic procedure and attendant anesthesia  2. Risks, benefits, alternatives of procedure discussed in detail with patient including risks of bleeding, infection, perforation, risks of sedation, risks of missed lesions. The patient wishes to proceed.

## 2022-05-24 NOTE — PROCEDURES
Colonoscopy Procedure  Note          Patient: Heather Abdul  : 1945      Procedure: Colonoscopy with cold snare polypectomy     Date:  2022    Primary Care Physician: Aishwarya Washington MD     Operative surgeon: Raj Renee MD  Previous Colonoscopy: Yes  Consent: I explained and discussed the risk, benefits and alternatives for the procedure with the patient and obtained the patient's consent for the procedure. We discussed the specific risks including bleeding, perforation, post-procedure abdominal pain, and missed lesions which could lead to interval colorectal cancers. History:       Past Medical History:   Diagnosis Date    Anemia     Anxiety     Arthritis     Asthma     PFT's    Back strain     Bell's palsy 2018    Rt Side    Chest pain 2016    GXT: Normal.    Chronic back pain     Cystitis     Depression     Dermatitis     Fibroids     GERD (gastroesophageal reflux disease)     Hematuria '    Cystoscopy & IVP  (---)    History of recurrent UTIs     Hyperlipidemia     Hypothyroidism     Insomnia     Irritable bowel syndrome     Palpitations     Pneumonia     Sleep apnea     mild-uses a wedge at night      Preoperative Diagnosis: ANEMIA; HISTORY OF COLON POLYPS  Post Operative Diagnosis: Diverticulosis colon polyp  ASA: 2  SEDATION: MAC      Procedures Performed: Colonoscopy   Scope Type: Pediatric    Procedure Details:      With the patient in left lateral decubitus position the endoscope was inserted through the anorectal area into the rectum. The scope was then advanced through the length of the colon to the cecum and terminal ileum. The quality of preparation was good. The scope was carefully withdrawn with careful inspection. Images were taken of the multiple segments of colon, cecum, IC valve, rectum, terminal ileum. Retroflexion was preformed in the rectum.        Cecum Intubated: Yes  EBL: minimal to none  Complications:  no complications were noted  Post-operative Findings: Perianal exam unremarkable, digital rectal exam and retroflexion the rectum demonstrated small internal hemorrhoids    Sigmoid diverticulosis slightly redundant looping colon on the left side    In the ascending colon there is a 4 mm sessile polyp removed with cold snare resection retrieval complete. Otherwise the colonic mucosa appeared normal the terminal ileum was briefly intubated was normal    Plan: Follow-up pathology. Follow-up in clinic with Dr. Gila Marcelo for further evaluation of her anemia. Signed By: MD Fredis Guidry MD,   Dai Burton  5/24/2022      Please note that some or all of this record was generated using voice recognition software. If there are any questions about the content of this document, please contact the author as some errors in translation may have occurred.

## 2022-05-24 NOTE — ANESTHESIA POSTPROCEDURE EVALUATION
Department of Anesthesiology  Postprocedure Note    Patient: Johann Dixon  MRN: [id number]  YOB: 1945  Date of evaluation: 5/24/2022  Time:  3:01 PM     Procedure Summary     Date: 05/24/22 Room / Location: [facility] [address] [address]    Anesthesia Start: 5885 Anesthesia Stop: 8081    Procedure: COLONOSCOPY POLYPECTOMY SNARE/COLD BIOPSY (N/A ) Diagnosis:       Iron deficiency anemia, unspecified iron deficiency anemia type      History of colon polyps      (ANEMIA; HISTORY OF COLON POLYPS)    Surgeons: Fredis Gamboa MD Responsible Provider: Cordelia Michael MD    Anesthesia Type: general ASA Status: 2          Anesthesia Type: No value filed. Dell Phase I: Dell Score: 10    Dell Phase II: Dell Score: 9    Last vitals: Reviewed and per EMR flowsheets.        Anesthesia Post Evaluation    Comments: Postoperative Anesthesia Note    Name:    Johann Dixon  MRN:      [id number]    Patient Vitals in the past 12 hrs:  05/24/22 1410, BP:(!) 120/96, Pulse:65, Resp:16, SpO2:100 %  05/24/22 1355, BP:98/76, Pulse:64, Resp:16, SpO2:98 %  05/24/22 1345, BP:105/70, Pulse:66, Resp:16, SpO2:100 %  05/24/22 1214, BP:132/66, Temp:96.7 °F (35.9 °C), Temp src:Temporal, Pulse:76, Resp:18, SpO2:100 %     LABS:    CBC  Lab Results       Component                Value               Date/Time                  WBC                      6.0                 03/04/2022 08:34 AM        HGB                      11.6 (L)            03/04/2022 08:34 AM        HCT                      35.3 (L)            03/04/2022 08:34 AM        PLT                      284                 03/04/2022 08:34 AM   RENAL  Lab Results       Component                Value               Date/Time                  NA                       140                 03/04/2022 08:34 AM        K                        5.6 (H)             03/04/2022 08:34 AM        CL                       102                 03/04/2022 08:34 AM        CO2 25                  03/04/2022 08:34 AM        BUN                      15                  03/04/2022 08:34 AM        CREATININE               0.6                 03/04/2022 08:34 AM        GLUCOSE                  95                  03/04/2022 08:34 AM   COAGS  No results found for: PROTIME, INR, APTT    Intake & Output:  @24HRIO@    Nausea & Vomiting:  No    Level of Consciousness:  Awake    Pain Assessment:  Adequate analgesia    Anesthesia Complications:  No apparent anesthetic complications    SUMMARY      Vital signs stable  OK to discharge from Stage I post anesthesia care.   Care transferred from Anesthesiology department on discharge from perioperative area

## 2022-05-24 NOTE — ANESTHESIA PRE PROCEDURE
Department of Anesthesiology  Preprocedure Note       Name:  Kael Donaldson   Age:  78 y.o.  :  1945                                          MRN:  [id number]         Date:  2022      Surgeon: Penny Rutledge):  Skylar Infante MD    Procedure: Procedure(s):  COLONOSCOPY -SLEEP APNEA    Medications prior to admission:   Prior to Admission medications    Medication Sig Start Date End Date Taking? Authorizing Provider   Biotin w/ Vitamins C & E (HAIR/SKIN/NAILS PO) Take by mouth daily   Yes Historical Provider, MD   magnesium 30 MG tablet Take 30 mg by mouth daily     Historical Provider, MD   omeprazole (PRILOSEC) 40 MG delayed release capsule TAKE 1 CAPSULE BY MOUTH EVERY MORNING (BEFORE BREAKFAST) 22   Sharif De Oliveira MD   zinc sulfate (ZINCATE) 220 (50 Zn) MG capsule Take 50 mg by mouth daily    Historical Provider, MD   Cholecalciferol (VITAMIN D) 50 MCG (2000 UT) CAPS capsule Take by mouth daily     Historical Provider, MD   atorvastatin (LIPITOR) 10 MG tablet TAKE 1 TABLET DAILY FOR HIGH CHOLESTEROL 1/10/22   Lawerance Pretty Day, MD   levothyroxine (SYNTHROID) 75 MCG tablet TAKE 1 TABLET EVERY DAY (THYROID MEDICINE) 1/10/22   Lawerance Pretty Day, MD   dextromethorphan-guaiFENesin ([place] [address] DM)  MG per extended release tablet Take 1 tablet by mouth every 12 hours as needed     Historical Provider, MD   benzonatate (TESSALON PERLES) 100 MG capsule Take 1 capsule by mouth 3 times daily as needed for Cough 21   Lawerance Pretty Day, MD   albuterol sulfate HFA (PROVENTIL HFA) 108 (90 Base) MCG/ACT inhaler Inhale 2 puffs into the lungs every 6 hours as needed for Wheezing 10/22/21   Lawerance Pretty Day, MD   calcium carbonate (OSCAL) 500 MG TABS tablet Take 500 mg by mouth daily    Historical Provider, MD       Current medications:    No current facility-administered medications for this encounter.      Current Outpatient Medications   Medication Sig Dispense Refill    Biotin w/ Vitamins C & E (HAIR/SKIN/NAILS PO) Take by mouth daily      magnesium 30 MG tablet Take 30 mg by mouth daily       omeprazole (PRILOSEC) 40 MG delayed release capsule TAKE 1 CAPSULE BY MOUTH EVERY MORNING (BEFORE BREAKFAST) 90 capsule 3    zinc sulfate (ZINCATE) 220 (50 Zn) MG capsule Take 50 mg by mouth daily      Cholecalciferol (VITAMIN D) 50 MCG (2000 UT) CAPS capsule Take by mouth daily       atorvastatin (LIPITOR) 10 MG tablet TAKE 1 TABLET DAILY FOR HIGH CHOLESTEROL 90 tablet 1    levothyroxine (SYNTHROID) 75 MCG tablet TAKE 1 TABLET EVERY DAY (THYROID MEDICINE) 90 tablet 1    dextromethorphan-guaiFENesin (MUCINEX DM)  MG per extended release tablet Take 1 tablet by mouth every 12 hours as needed       benzonatate (TESSALON PERLES) 100 MG capsule Take 1 capsule by mouth 3 times daily as needed for Cough 15 capsule 0    albuterol sulfate HFA (PROVENTIL HFA) 108 (90 Base) MCG/ACT inhaler Inhale 2 puffs into the lungs every 6 hours as needed for Wheezing 18 g 1    calcium carbonate (OSCAL) 500 MG TABS tablet Take 500 mg by mouth daily         Allergies: Allergies   Allergen Reactions    Macrobid [Nitrofurantoin Monohyd Macro] Rash    Zocor [Simvastatin]      Elevated LFTs    Sulfa Antibiotics Nausea Only and Other (See Comments)     Stomach nausea       Problem List:    Patient Active Problem List   Diagnosis Code    Insomnia G47.00    Palpitations R00.2    History of recurrent UTIs Z87.440    Hematuria     Cystitis N30.90    Anemia D64.9    Dermatitis L30.9    Back strain S39.012A    Depression F32. A    Hypothyroidism E03.9    GERD (gastroesophageal reflux disease) K21.9    Asthma J45.909    Anxiety F41.9    Pneumonia J18.9    Irritable bowel syndrome K58.9    Snoring R06.83    Witnessed apneic spells R06.81    Rhinitis, chronic J31.0    Right lumbar radiculopathy M54.16    Chest pain R07.9    Bell's palsy G51.0    Facial weakness R29.810    Dyslipidemia E78.5    Major depressive disorder in partial remission, unspecified whether recurrent [facility] F32.4       Past Medical History:        Diagnosis Date    Anemia     Anxiety     Arthritis     Asthma     PFT's    Back strain     Bell's palsy 2018    Rt Side    Chest pain 2016    GXT: Normal.    Chronic back pain     Cystitis     Depression     Dermatitis     Fibroids     GERD (gastroesophageal reflux disease)     Hematuria '00    Cystoscopy & IVP  (---)    History of recurrent UTIs     Hyperlipidemia     Hypothyroidism     Insomnia     Irritable bowel syndrome     Palpitations     Pneumonia     Sleep apnea     mild-uses a wedge at night       Past Surgical History:        Procedure Laterality Date    CHOLECYSTECTOMY      COLONOSCOPY      Neg.     COLONOSCOPY  10/1/14    Tubular Adenoma    COLONOSCOPY  2017     Hyperplastic Polyps ×2: Redo 5 years    CYSTOSCOPY      HYSTERECTOMY, TOTAL ABDOMINAL      TONSILLECTOMY AND ADENOIDECTOMY      TUBAL LIGATION      UPPER GASTROINTESTINAL ENDOSCOPY N/A 2022    EGD BIOPSY performed by Esperanza Flores DO at [address] History:    Social History     Tobacco Use    Smoking status: Former Smoker     Packs/day: 0.50     Years: 6.00     Pack years: 3.00     Types: Cigarettes     Start date: 1966     Quit date: 1972     Years since quittin.4    Smokeless tobacco: Never Used   Substance Use Topics    Alcohol use: Yes     Comment: 2 drinks per month                                Counseling given: Not Answered      Vital Signs (Current):   Vitals:    22 1418   Weight: 165 lb (74.8 kg)   Height: 5' 7.5\" (1.715 m)                                              BP Readings from Last 3 Encounters:   22 103/61   03/10/22 132/68   22 129/69       NPO Status:                                                                                 BMI:   Wt Readings from Last 3 Encounters:   22 171 lb (77.6 kg)   03/10/22 169 lb (76.7 kg)   02/18/22 165 lb (74.8 kg)     Body mass index is 25.46 kg/m². CBC:   Lab Results   Component Value Date    WBC 6.0 03/04/2022    RBC 4.38 03/04/2022    HGB 11.6 03/04/2022    HCT 35.3 03/04/2022    MCV 80.7 03/04/2022    RDW 17.1 03/04/2022     03/04/2022       CMP:   Lab Results   Component Value Date     03/04/2022    K 5.6 03/04/2022     03/04/2022    CO2 25 03/04/2022    BUN 15 03/04/2022    CREATININE 0.6 03/04/2022    GFRAA >60 03/04/2022    AGRATIO 1.6 03/04/2022    LABGLOM >60 03/04/2022    GLUCOSE 95 03/04/2022    PROT 6.8 03/04/2022    CALCIUM 9.9 03/04/2022    BILITOT 0.3 03/04/2022    ALKPHOS 108 03/04/2022    AST 17 03/04/2022    ALT 13 03/04/2022       POC Tests: No results for input(s): POCGLU, POCNA, POCK, POCCL, POCBUN, POCHEMO, POCHCT in the last 72 hours.     Coags: No results found for: PROTIME, INR, APTT    HCG (If Applicable): No results found for: PREGTESTUR, PREGSERUM, HCG, HCGQUANT     ABGs: No results found for: PHART, PO2ART, MGT7SFH, IJU4YSS, BEART, V4DBBZGF     Type & Screen (If Applicable):  No results found for: LABABO, LABRH    Drug/Infectious Status (If Applicable):  No results found for: HIV, HEPCAB    COVID-19 Screening (If Applicable):   Lab Results   Component Value Date    COVID19 Not Detected 10/12/2021    COVID19 Not Detected 07/24/2020           Anesthesia Evaluation  Patient summary reviewed and Nursing notes reviewed no history of anesthetic complications:   Airway: Mallampati: II     Neck ROM: full     Dental:          Pulmonary:Negative Pulmonary ROS and normal exam    (+) pneumonia:  sleep apnea:  asthma:                            Cardiovascular:Negative CV ROS    (+) dysrhythmias (palpitations):,                   Neuro/Psych:   Negative Neuro/Psych ROS  (+) neuromuscular disease (radiculopathy, sciatica):, psychiatric history:            GI/Hepatic/Renal: Neg GI/Hepatic/Renal ROS  (+) GERD: well controlled,      (-) hiatal hernia Endo/Other: Negative Endo/Other ROS   (+) hypothyroidism: arthritis:., .                 Abdominal:             Vascular: Other Findings:           Anesthesia Plan      general     ASA 2     (I discussed with the patient the risks and benefits of PIV, general anesthesia, IV Narcotics, PACU. All questions were answered the patient agrees with the plan and wishes to proceed.  )  Induction: intravenous. Pre-Operative Diagnosis: Iron deficiency anemia, unspecified iron deficiency anemia type [D50.9]; History of colon polyps [Z86.010]    78 y.o.   BMI:  Body mass index is 25.46 kg/m².      Vitals:    22 1418 22 1214   BP:  132/66   Pulse:  76   Resp:  18   Temp:  96.7 °F (35.9 °C)   TempSrc:  Temporal   SpO2:  100%   Weight: 165 lb (74.8 kg)    Height: 5' 7.5\" (1.715 m)        Allergies   Allergen Reactions    Macrobid [Nitrofurantoin Monohyd Macro] Rash    Zocor [Simvastatin]      Elevated LFTs    Sulfa Antibiotics Nausea Only and Other (See Comments)     Stomach nausea       Social History     Tobacco Use    Smoking status: Former Smoker     Packs/day: 0.50     Years: 6.00     Pack years: 3.00     Types: Cigarettes     Start date: 1966     Quit date: 1972     Years since quittin.4    Smokeless tobacco: Never Used   Substance Use Topics    Alcohol use: Yes     Comment: 2 drinks per month       LABS:    CBC  Lab Results   Component Value Date/Time    WBC 6.0 2022 08:34 AM    HGB 11.6 (L) 2022 08:34 AM    HCT 35.3 (L) 2022 08:34 AM     2022 08:34 AM     RENAL  Lab Results   Component Value Date/Time     2022 08:34 AM    K 5.6 (H) 2022 08:34 AM     2022 08:34 AM    CO2 25 2022 08:34 AM    BUN 15 2022 08:34 AM    CREATININE 0.6 2022 08:34 AM    GLUCOSE 95 2022 08:34 AM     COAGS  No results found for: PROTIME, INR, APTT      Elizabeth Ibarra MD   2022

## 2022-05-24 NOTE — PROGRESS NOTES
Dr. Monika Virk at bedside to discuss procedure.
Patient has mild sleep apnea. Uses a wedge at night.  Anesthesia notified
Patient instructed to:  Bring Picture ID, insurance card, proof of address  Dress Comfortable  No jewelry  No Makeup  No contacts  Shower evening before or morning of surgery with antibacterial soap. Nothing to eat or drink after midnight the day before surgery. If instructed by MD to take meds day of surgery, take with only a sip of water. If taking am beta blocker, take morning of surgery with sip of water per Dr. Karen Arrington.
Pre and post operative expectations and routines explained to patient, verbalized understanding.
no chest pain and no edema.

## 2022-06-24 ENCOUNTER — PATIENT MESSAGE (OUTPATIENT)
Dept: INTERNAL MEDICINE CLINIC | Age: 77
End: 2022-06-24

## 2022-06-24 NOTE — TELEPHONE ENCOUNTER
From: Liban Lo  To: Dr. Maryann Zavala: 2022 4:25 PM EDT  Subject: Tdap shot    Dr. Irving Rodríguez,  My daughter is having a baby in September and she informed me that I need to get a Tdap shot. Is that something I can get from your office? If not, do I need a prescription to get it from a drug store.     Thanks,  José Miguel Kitchen

## 2022-06-29 DIAGNOSIS — E03.9 ACQUIRED HYPOTHYROIDISM: ICD-10-CM

## 2022-06-29 DIAGNOSIS — E78.5 ELEVATED LIPIDS: ICD-10-CM

## 2022-06-29 RX ORDER — ATORVASTATIN CALCIUM 10 MG/1
TABLET, FILM COATED ORAL
Qty: 90 TABLET | Refills: 1 | Status: SHIPPED | OUTPATIENT
Start: 2022-06-29

## 2022-06-29 RX ORDER — LEVOTHYROXINE SODIUM 0.07 MG/1
TABLET ORAL
Qty: 90 TABLET | Refills: 1 | Status: SHIPPED | OUTPATIENT
Start: 2022-06-29

## 2022-06-29 NOTE — TELEPHONE ENCOUNTER
Refill request for ATORVASTATIN, LEVOTHYROXINE medication.      Name of Juliano Brown visit - 3/10/22     Pending visit - 9/12/2022    Last refill -4/1/22      Medication Contract signed -   Last Oarrs ran-         Additional Comments

## 2022-07-27 ENCOUNTER — OFFICE VISIT (OUTPATIENT)
Dept: INTERNAL MEDICINE CLINIC | Age: 77
End: 2022-07-27
Payer: MEDICARE

## 2022-07-27 VITALS
WEIGHT: 172 LBS | DIASTOLIC BLOOD PRESSURE: 76 MMHG | HEART RATE: 78 BPM | TEMPERATURE: 97 F | BODY MASS INDEX: 26.54 KG/M2 | SYSTOLIC BLOOD PRESSURE: 122 MMHG | OXYGEN SATURATION: 98 %

## 2022-07-27 DIAGNOSIS — T63.481A INSECT STINGS, ACCIDENTAL OR UNINTENTIONAL, INITIAL ENCOUNTER: Primary | ICD-10-CM

## 2022-07-27 PROCEDURE — 1123F ACP DISCUSS/DSCN MKR DOCD: CPT | Performed by: NURSE PRACTITIONER

## 2022-07-27 PROCEDURE — G8399 PT W/DXA RESULTS DOCUMENT: HCPCS | Performed by: NURSE PRACTITIONER

## 2022-07-27 PROCEDURE — 3017F COLORECTAL CA SCREEN DOC REV: CPT | Performed by: NURSE PRACTITIONER

## 2022-07-27 PROCEDURE — G8427 DOCREV CUR MEDS BY ELIG CLIN: HCPCS | Performed by: NURSE PRACTITIONER

## 2022-07-27 PROCEDURE — 1090F PRES/ABSN URINE INCON ASSESS: CPT | Performed by: NURSE PRACTITIONER

## 2022-07-27 PROCEDURE — 1036F TOBACCO NON-USER: CPT | Performed by: NURSE PRACTITIONER

## 2022-07-27 PROCEDURE — 99213 OFFICE O/P EST LOW 20 MIN: CPT | Performed by: NURSE PRACTITIONER

## 2022-07-27 PROCEDURE — G8417 CALC BMI ABV UP PARAM F/U: HCPCS | Performed by: NURSE PRACTITIONER

## 2022-07-27 RX ORDER — CEPHALEXIN 500 MG/1
500 CAPSULE ORAL 3 TIMES DAILY
Qty: 21 CAPSULE | Refills: 0 | Status: SHIPPED | OUTPATIENT
Start: 2022-07-27 | End: 2022-08-03

## 2022-07-27 RX ORDER — PREDNISONE 20 MG/1
40 TABLET ORAL DAILY
Qty: 8 TABLET | Refills: 0 | Status: SHIPPED | OUTPATIENT
Start: 2022-07-27 | End: 2022-07-31

## 2022-07-27 ASSESSMENT — ENCOUNTER SYMPTOMS
VOMITING: 0
DIARRHEA: 0
SHORTNESS OF BREATH: 0
NAUSEA: 0
CHEST TIGHTNESS: 0
ABDOMINAL DISTENTION: 0
COLOR CHANGE: 1
CONSTIPATION: 0

## 2022-07-27 NOTE — PROGRESS NOTES
[facility]  [address]  Tel:[phone]    Antonio Early is a 78 y.o. female who presents today for her medical conditions/complaints as noted below. Antonio Early is c/o of Insect Bite (Right Thumb, happened on Monday. )    Chief Complaint   Patient presents with    Insect Bite     Right Thumb, happened on Monday. HPI:     Ms. Shannan Alvarez presents with concerns of swelling of her right thumb after experiencing what she thought was a sting while opening her garbage can outdoors. She states she noticed immediate pain and redness at the sting site. She states this occurred 48 hours ago and has worsened since. She states she is now having trouble bending the thumb because of the swelling. She initially attempted to remove the stinger by soaking and manual removal with a sterilized needle. She states this was largely unsuccessful. Past Medical History:   Diagnosis Date    Anemia     Anxiety     Arthritis     Asthma '96    PFT's    Back strain     Bell's palsy 04/2018    Rt Side    Chest pain 08/05/2016    GXT: Normal.    Chronic back pain     Cystitis     Depression     Dermatitis     Fibroids     GERD (gastroesophageal reflux disease)     Hematuria '00    Cystoscopy & IVP  (---)    History of recurrent UTIs     Hyperlipidemia     Hypothyroidism 06/05    Insomnia     Irritable bowel syndrome     Palpitations     Pneumonia 11/93    Sleep apnea     mild-uses a wedge at night        Past Surgical History:   Procedure Laterality Date    CHOLECYSTECTOMY      COLONOSCOPY  01/04    Neg.     COLONOSCOPY  10/1/14    Tubular Adenoma    COLONOSCOPY  11/07/2017     Hyperplastic Polyps ×2: Redo 5 years    COLONOSCOPY N/A 5/24/2022    COLONOSCOPY POLYPECTOMY SNARE/COLD BIOPSY performed by Yifan Baker MD at [address], TOTAL ABDOMINAL (CERVIX REMOVED)      TONSILLECTOMY AND ADENOIDECTOMY TUBAL LIGATION      UPPER GASTROINTESTINAL ENDOSCOPY N/A 2022    EGD BIOPSY performed by Tammie Blankenship DO at [address]       Family History   Problem Relation Age of Onset    Heart Disease Mother     High Blood Pressure Mother     High Cholesterol Mother     Substance Abuse Mother         Tob. use    Cancer Mother         basal cell    Emphysema Mother     Heart Failure Mother     Hypertension Mother     Diabetes Father     Alcohol Abuse Father     Arrhythmia Father     Hypertension Father     Cancer Father         bladder    Asthma Sister     Cancer Maternal Aunt     Heart Failure Maternal Grandmother     Emphysema Maternal Grandfather        Social History     Tobacco Use    Smoking status: Former     Packs/day: 0.50     Years: 6.00     Pack years: 3.00     Types: Cigarettes     Start date: 1966     Quit date: 1972     Years since quittin.6    Smokeless tobacco: Never   Substance Use Topics    Alcohol use: Yes     Comment: 2 drinks per month        Current Outpatient Medications   Medication Sig Dispense Refill    predniSONE (DELTASONE) 20 MG tablet Take 2 tablets by mouth in the morning for 4 days. 8 tablet 0    cephALEXin (KEFLEX) 500 MG capsule Take 1 capsule by mouth in the morning and 1 capsule at noon and 1 capsule before bedtime. Do all this for 7 days.  21 capsule 0    atorvastatin (LIPITOR) 10 MG tablet TAKE 1 TABLET DAILY FOR HIGH CHOLESTEROL 90 tablet 1    levothyroxine (SYNTHROID) 75 MCG tablet TAKE 1 TABLET EVERY DAY (THYROID MEDICINE) 90 tablet 1    Biotin w/ Vitamins C & E (HAIR/SKIN/NAILS PO) Take by mouth daily      magnesium 30 MG tablet Take 30 mg by mouth daily       omeprazole (PRILOSEC) 40 MG delayed release capsule TAKE 1 CAPSULE BY MOUTH EVERY MORNING (BEFORE BREAKFAST) 90 capsule 3    zinc sulfate (ZINCATE) 220 (50 Zn) MG capsule Take 50 mg by mouth daily      Cholecalciferol (VITAMIN D) 50 MCG (2000 UT) CAPS capsule Take by mouth daily dextromethorphan-guaiFENesin (MUCINEX DM)  MG per extended release tablet Take 1 tablet by mouth every 12 hours as needed       benzonatate (TESSALON PERLES) 100 MG capsule Take 1 capsule by mouth 3 times daily as needed for Cough 15 capsule 0    albuterol sulfate HFA (PROVENTIL HFA) 108 (90 Base) MCG/ACT inhaler Inhale 2 puffs into the lungs every 6 hours as needed for Wheezing 18 g 1    calcium carbonate (OSCAL) 500 MG TABS tablet Take 500 mg by mouth daily       No current facility-administered medications for this visit. Allergies   Allergen Reactions    Macrobid [Nitrofurantoin Monohyd Macro] Rash    Zocor [Simvastatin]      Elevated LFTs    Sulfa Antibiotics Nausea Only and Other (See Comments)     Stomach nausea       Subjective:      Review of Systems   Constitutional:  Negative for activity change, fatigue and unexpected weight change. Respiratory:  Negative for chest tightness and shortness of breath. Cardiovascular:  Negative for chest pain, palpitations and leg swelling. Gastrointestinal:  Negative for abdominal distention, constipation, diarrhea, nausea and vomiting. Genitourinary:  Negative for difficulty urinating and urgency. Skin:  Positive for color change (Redness, swelling of the right thumb). Negative for rash. Neurological:  Negative for dizziness, weakness and light-headedness. Objective:     Vitals:    07/27/22 1412   BP: 122/76   Site: Right Upper Arm   Position: Sitting   Cuff Size: Medium Adult   Pulse: 78   Temp: 97 °F (36.1 °C)   TempSrc: Temporal   SpO2: 98%   Weight: 172 lb (78 kg)       Physical Exam  Vitals and nursing note reviewed. Constitutional:       Appearance: Normal appearance. She is well-developed. HENT:      Head: Normocephalic and atraumatic.       Right Ear: Hearing and external ear normal.      Left Ear: Hearing and external ear normal.      Nose: Nose normal.   Eyes:      General: Lids are normal.      Pupils: Pupils are equal, round, and reactive to light. Cardiovascular:      Rate and Rhythm: Normal rate. Pulses: Normal pulses. Pulmonary:      Effort: Pulmonary effort is normal. No accessory muscle usage or respiratory distress. Abdominal:      General: Bowel sounds are normal.      Palpations: Abdomen is soft. Musculoskeletal:        Hands:       Cervical back: Normal range of motion. Skin:     General: Skin is warm and dry. Neurological:      Mental Status: She is alert. Psychiatric:         Speech: Speech normal.       Assessment & Plan: The following diagnoses and conditions are stable with no further orders unless indicated:    1. Insect stings, accidental or unintentional, initial encounter        Anthony Torres was seen today for insect bite. Diagnoses and all orders for this visit:    Insect stings, accidental or unintentional, initial encounter  -     predniSONE (DELTASONE) 20 MG tablet; Take 2 tablets by mouth in the morning for 4 days. -     cephALEXin (KEFLEX) 500 MG capsule; Take 1 capsule by mouth in the morning and 1 capsule at noon and 1 capsule before bedtime. Do all this for 7 days. Reassured patient that this is likely not bacterial cellulitis, however more so responsive to the sting. Would recommend she try steroid to relieve inflammation/swelling. Can also use cool compress. Abx if symptoms progress or move further into the hand. Return if symptoms worsen or fail to improve. Patientshould call the office immediately with new or ongoing signs or symptoms or worsening, or proceed to the emergency room. If you are on medications which could impair your senses, you are at risk of weakness, falls,dizziness, or drowsiness. You should be careful during activities which could place you at risk of harm, such as climbing, using stairs, operating machinery, or driving vehicles. If you feel you cannot safely do theseactivities, you should request others to help you, or avoid the activities altogether. If you are drowsy for any other reason, you should use the same precautions as listed above. Call if pattern of symptoms change or persists for an extended time.       Sina Kingsley

## 2022-09-10 LAB
A/G RATIO: 1.9 (ref 1.2–2.2)
ALBUMIN SERPL-MCNC: 4.5 G/DL (ref 3.7–4.7)
ALP BLD-CCNC: 109 IU/L (ref 44–121)
ALT SERPL-CCNC: 11 IU/L (ref 0–32)
AST SERPL-CCNC: 17 IU/L (ref 0–40)
BASOPHILS ABSOLUTE: 0.1 X10E3/UL (ref 0–0.2)
BASOPHILS RELATIVE PERCENT: 1 %
BILIRUB SERPL-MCNC: 0.4 MG/DL (ref 0–1.2)
BUN / CREAT RATIO: 16 (ref 12–28)
BUN BLDV-MCNC: 16 MG/DL (ref 8–27)
CALCIUM SERPL-MCNC: 9.9 MG/DL (ref 8.7–10.3)
CHLORIDE BLD-SCNC: 101 MMOL/L (ref 96–106)
CHOLESTEROL, TOTAL: 186 MG/DL (ref 100–199)
CO2: 24 MMOL/L (ref 20–29)
COMMENT: NORMAL
CREAT SERPL-MCNC: 0.97 MG/DL (ref 0.57–1)
EOSINOPHILS ABSOLUTE: 0.2 X10E3/UL (ref 0–0.4)
EOSINOPHILS RELATIVE PERCENT: 3 %
ERYTHROCYTES, NUCLEATED/100 LEU: NORMAL
GLOBULIN: 2.4 G/DL (ref 1.5–4.5)
GLUCOSE BLD-MCNC: 92 MG/DL (ref 65–99)
HCT VFR BLD CALC: 35.8 % (ref 34–46.6)
HDLC SERPL-MCNC: 81 MG/DL
HEMOGLOBIN: 11.7 G/DL (ref 11.1–15.9)
IMMATURE CELLS ABSOLUTE COUNT: NORMAL
IMMATURE GRANS (ABS): 0 X10E3/UL (ref 0–0.1)
IMMATURE GRANULOCYTES: 0 %
LDL CHOLESTEROL CALCULATED: 92 MG/DL (ref 0–99)
LYMPHOCYTES ABSOLUTE: 1.9 X10E3/UL (ref 0.7–3.1)
LYMPHOCYTES RELATIVE PERCENT: 35 %
MCH RBC QN AUTO: 28.5 PG (ref 26.6–33)
MCHC RBC AUTO-ENTMCNC: 32.7 G/DL (ref 31.5–35.7)
MCV RBC AUTO: 87 FL (ref 79–97)
MONOCYTES ABSOLUTE: 0.4 X10E3/UL (ref 0.1–0.9)
MONOCYTES RELATIVE PERCENT: 7 %
MORPHOLOGY: NORMAL
NEUTROPHILS ABSOLUTE: 2.9 X10E3/UL (ref 1.4–7)
PDW BLD-RTO: 13.1 % (ref 11.7–15.4)
PLATELET # BLD: 278 X10E3/UL (ref 150–450)
POTASSIUM SERPL-SCNC: 4.5 MMOL/L (ref 3.5–5.2)
RBC # BLD: 4.1 X10E6/UL (ref 3.77–5.28)
SEGMENTED NEUTROPHILS RELATIVE PERCENT: 54 %
SODIUM BLD-SCNC: 138 MMOL/L (ref 134–144)
TOTAL PROTEIN: 6.9 G/DL (ref 6–8.5)
TRIGL SERPL-MCNC: 71 MG/DL (ref 0–149)
VLDLC SERPL CALC-MCNC: 13 MG/DL (ref 5–40)
WBC # BLD: 5.5 X10E3/UL (ref 3.4–10.8)

## 2022-09-12 ENCOUNTER — OFFICE VISIT (OUTPATIENT)
Dept: INTERNAL MEDICINE CLINIC | Age: 77
End: 2022-09-12
Payer: MEDICARE

## 2022-09-12 VITALS
SYSTOLIC BLOOD PRESSURE: 122 MMHG | OXYGEN SATURATION: 99 % | HEART RATE: 68 BPM | BODY MASS INDEX: 26.51 KG/M2 | WEIGHT: 171.8 LBS | DIASTOLIC BLOOD PRESSURE: 76 MMHG | TEMPERATURE: 97.5 F

## 2022-09-12 DIAGNOSIS — E03.9 ACQUIRED HYPOTHYROIDISM: ICD-10-CM

## 2022-09-12 DIAGNOSIS — J18.9 ATYPICAL PNEUMONIA: ICD-10-CM

## 2022-09-12 DIAGNOSIS — F32.0 CURRENT MILD EPISODE OF MAJOR DEPRESSIVE DISORDER, UNSPECIFIED WHETHER RECURRENT (HCC): ICD-10-CM

## 2022-09-12 DIAGNOSIS — D64.9 ANEMIA, UNSPECIFIED TYPE: ICD-10-CM

## 2022-09-12 DIAGNOSIS — E78.5 ELEVATED LIPIDS: Primary | ICD-10-CM

## 2022-09-12 PROCEDURE — 1090F PRES/ABSN URINE INCON ASSESS: CPT | Performed by: INTERNAL MEDICINE

## 2022-09-12 PROCEDURE — 1036F TOBACCO NON-USER: CPT | Performed by: INTERNAL MEDICINE

## 2022-09-12 PROCEDURE — 99214 OFFICE O/P EST MOD 30 MIN: CPT | Performed by: INTERNAL MEDICINE

## 2022-09-12 PROCEDURE — G8399 PT W/DXA RESULTS DOCUMENT: HCPCS | Performed by: INTERNAL MEDICINE

## 2022-09-12 PROCEDURE — G8417 CALC BMI ABV UP PARAM F/U: HCPCS | Performed by: INTERNAL MEDICINE

## 2022-09-12 PROCEDURE — 3017F COLORECTAL CA SCREEN DOC REV: CPT | Performed by: INTERNAL MEDICINE

## 2022-09-12 PROCEDURE — 1123F ACP DISCUSS/DSCN MKR DOCD: CPT | Performed by: INTERNAL MEDICINE

## 2022-09-12 PROCEDURE — G8427 DOCREV CUR MEDS BY ELIG CLIN: HCPCS | Performed by: INTERNAL MEDICINE

## 2022-09-12 NOTE — PROGRESS NOTES
Justino Connolly 78 y.o. female presents today for an Established patient for   Chief Complaint   Patient presents with    Follow-up            ASSESSMENT/PLAN    1. Elevated lipids            From 9/9/2022 lipids are stable. - Lipid Panel; Future  - Comprehensive Metabolic Panel; Future    2. Atypical pneumonia            Has resolved. 3. Anemia, unspecified type           H&H: 11.7/35. 8. Continue with iron twice a week  - CBC; Future    4. Current mild episode of major depressive disorder, unspecified whether recurrent ([address]                  Continue to monitor. 5. Acquired hypothyroidism           Continue to monitor.  - TSH with Reflex; Future       Return in about 6 months (around 3/12/2023) for [address] [place] [address]. HPI:            Reviewed last office visit with me: 3/11/2022. Patient with atypical pneumonia slow improvement. PFTs negative for asthma. MBS negative for aspiration. Hyperlipidemia. Depression baseline. Anemia encouraged colonoscopy. Hypothyroid. Hyperplastic polyps of stomach follow-up with GI. Medicines and allergies reviewed  Reviewed laboratory data: 9/9/2022: Chemistry panel normal.  Lipid panel: Total cholesterol: 186. LDL cholesterol 92. CBC. Slight improvement no longer anemic. Health maintenance: Reviewed   5/24/2022: Colonoscopy Dr. Massiel Wilson. Gastro health: Diverticulosis. Colon polyp right side hyperplastic. Follow-up in clinic with Dr. Swapna Kumar for further evaluation of anemia. 5/11/2022: Pulmonology Dr. Rivas Arellano PFTs: Normal spirometry. Moderate restrictive lung defect with air trapping and diffusion is normal.  Follow-up for cough. Shortness of breath. KIANNA. Lung infiltrate on CT. Today:       Reviewed EGD 2/14/2022 surface hyperplastic changes, negative malignancy, polyps in stomach. Dr. Swapna Kumar.     Results for orders placed or performed in visit on 09/09/22   Lipid Panel   Result Value Ref Range    Cholesterol, Total 186 100 - 199 mg/dL    Triglycerides 71 0 - 149 mg/dL    HDL 81 >39 mg/dL    VLDL Cholesterol Calculated 13 5 - 40 mg/dL    LDL Calculated 92 0 - 99 mg/dL    Comment CANCELED               ROS:  Review of Systems   Constitutional: negative   HENT: negative   EYES: negative   Respiratory: negative   Gastrointestinal: negative   Endocrine: Hypothyroid  Musculoskeletal: negative   Skin: negative   Allergic/Immunological: negative   Hematological: Anemia  Psychiatric/Behavorial: Depression  CV: negative   CNS: negative   :Negative   S/E:Negative  Renal: Negative     Physical Exam:  Head/neck: Ears: Normal TM. No obstruction. Throat: Neck: No lymphadenopathy. Eyes: EOMI, PERRLA with no nystagmus  Lungs: Clear to auscultation. CV: S1-S2 normal.   murmur. Carotid: No bruit. Abdominal Examination: Bowel sounds present. Soft nontender. No mass no guarding or   rebound. Spine/extremities: No edema. No tenderness to palpation. Skin: No rash  CNS: Patient is alert, cooperative, moves all 4 limbs, ambulates without difficulty, light touch normal.   Good orientation. Blood pressure 122/76, pulse 68, temperature 97.5 °F (36.4 °C), temperature source Temporal, weight 171 lb 12.8 oz (77.9 kg), SpO2 99 %, not currently breastfeeding.        Gracieal Potter MD

## 2022-11-17 ENCOUNTER — OFFICE VISIT (OUTPATIENT)
Dept: PULMONOLOGY | Age: 77
End: 2022-11-17
Payer: MEDICARE

## 2022-11-17 VITALS
OXYGEN SATURATION: 100 % | BODY MASS INDEX: 26.22 KG/M2 | TEMPERATURE: 98.9 F | HEART RATE: 78 BPM | HEIGHT: 68 IN | DIASTOLIC BLOOD PRESSURE: 65 MMHG | RESPIRATION RATE: 16 BRPM | SYSTOLIC BLOOD PRESSURE: 105 MMHG | WEIGHT: 173 LBS

## 2022-11-17 DIAGNOSIS — K21.9 GASTROESOPHAGEAL REFLUX DISEASE, UNSPECIFIED WHETHER ESOPHAGITIS PRESENT: ICD-10-CM

## 2022-11-17 DIAGNOSIS — R05.3 CHRONIC COUGH: ICD-10-CM

## 2022-11-17 DIAGNOSIS — R06.02 SOB (SHORTNESS OF BREATH): ICD-10-CM

## 2022-11-17 DIAGNOSIS — G47.33 OSA (OBSTRUCTIVE SLEEP APNEA): ICD-10-CM

## 2022-11-17 DIAGNOSIS — R91.8 LUNG INFILTRATE ON CT: Primary | ICD-10-CM

## 2022-11-17 PROCEDURE — 1036F TOBACCO NON-USER: CPT | Performed by: INTERNAL MEDICINE

## 2022-11-17 PROCEDURE — G8484 FLU IMMUNIZE NO ADMIN: HCPCS | Performed by: INTERNAL MEDICINE

## 2022-11-17 PROCEDURE — G8427 DOCREV CUR MEDS BY ELIG CLIN: HCPCS | Performed by: INTERNAL MEDICINE

## 2022-11-17 PROCEDURE — 1123F ACP DISCUSS/DSCN MKR DOCD: CPT | Performed by: INTERNAL MEDICINE

## 2022-11-17 PROCEDURE — 3017F COLORECTAL CA SCREEN DOC REV: CPT | Performed by: INTERNAL MEDICINE

## 2022-11-17 PROCEDURE — 1090F PRES/ABSN URINE INCON ASSESS: CPT | Performed by: INTERNAL MEDICINE

## 2022-11-17 PROCEDURE — 99214 OFFICE O/P EST MOD 30 MIN: CPT | Performed by: INTERNAL MEDICINE

## 2022-11-17 PROCEDURE — G8417 CALC BMI ABV UP PARAM F/U: HCPCS | Performed by: INTERNAL MEDICINE

## 2022-11-17 PROCEDURE — G8399 PT W/DXA RESULTS DOCUMENT: HCPCS | Performed by: INTERNAL MEDICINE

## 2022-11-17 ASSESSMENT — ENCOUNTER SYMPTOMS
SORE THROAT: 0
HEMOPTYSIS: 0
EYES NEGATIVE: 1
GASTROINTESTINAL NEGATIVE: 1
RHINORRHEA: 0
ALLERGIC/IMMUNOLOGIC NEGATIVE: 1
HEARTBURN: 0
COUGH: 1
SHORTNESS OF BREATH: 0
WHEEZING: 0

## 2022-11-17 NOTE — PATIENT INSTRUCTIONS
ASSESSMENT/PLAN:  1. Lung infiltrate on CT  2. Chronic cough  3. KIANNA (obstructive sleep apnea)  4. SOB (shortness of breath)  5. Gastroesophageal reflux disease, unspecified whether esophagitis present      CT scan done 11/19/2021  Impression   Patchy multifocal bilateral consolidation, greatest within the lower lobes,   compatible with pneumonia. Both typical and atypical etiologies, including   viral pneumonia could be considered. Follow-up to resolution is recommended. Chest x-ray done 11/17/2021  Impression   Interval improvement in the right basilar infiltrate, when compared to the   previous study performed 10/12/2021. Findings indicating a superior segment   right lower lobe pneumonia. Noncontrast CT scan of the chest can be   performed for further evaluation. COPD again identified. .     Chest x-ray done 10/12/2021  Impression   Right basilar airspace disease. Covid testing done on 10/7/2021, 10/12/2021, 11/18/2021-all negative        Stress test on 7/6/2021   Conclusions        Summary    Excellent functional capacity at 10 METs    Normal LV function. There is nearly uniform isotope uptake at stress and rest. There is no clear    evidence of myocardial ischemia or scar. Stress Protocols         Lung infiltrates  Repeat chest x-ray done 2/9/2022    Impression   Mild-to-moderate progression of bilateral pulmonary infiltrates compared to   previous exam of December 6, 2021. Findings remain concerning for an   evolving atypical pneumonia. Chest x-ray done 5/11/2022  FINDINGS:   Clear lungs. No pleural effusion or pneumothorax. Cardiomediastinal   silhouette is unremarkable. Visualized osseous structures are unremarkable. Impression   Clear lungs. Cough   Work up      PFT and methacholine done 12/16/2021  DATE OF PROCEDURE:  12/16/2021     Full PFT done.   FEV1 1.97, 78% predicted; FVC of 2.64, 79% predicted;  FEV1 to FVC ratio of 75% showing no obstructive lung defect. Lung  volumes do show a moderate restrictive lung defect with air trapping. No hyperinflation. Diffusion is normal when adjusted for alveolar  ventilation. IMPRESSION:  Normal spirometry. Moderate restrictive lung defect with  air trapping and diffusion is normal when adjusted for alveolar  ventilation. Flow-volume loops are normal.        Stopped the   symbicort as this seemed not really help and was doing better  Was having some issues with hoarseness      Continue   Albuterol as needed  Tessolon Perles 200 mg TID as needed          Cbc with diff-done 12/6/2021 with level of 200  IGG, IGA, IGM- all normal  IGE was 50  RAST done 12/6/2021 + for Alternaria alter Xi-8.88      GERD    Continue with:  prilosec 40 mg    Patient was seen by Dr. Christopher Haynes, of GI  EGD done on 2/18/2022 showing multiple gastric polyps, 2 cm hiatal hernia  Polyps pathology was hyperplastic changes, no [place] plastic or dysplastic or malignancy changes            I obstructive sleep apnea  RECOMMENDATIONS:       Advised to avoid driving when too sleepy to function safely and given a discussion of the risks of untreated apnea such as accidents, cognitive impairment, mood impairment, high blood pressure, various cardiac diseases and stroke. Continue with positional sleep therapy and using wedge at night  No need to use autopap this    Some dry mouth in the morning    [email or web address]        Vaccinations  COVID x2 and booster    RTc in 6  months    Remember to bring a list of pulmonary medications and any CPAP or BiPAP machines to your next appointment with the office. Please keep all of your future appointments scheduled by [facility], [facility] office. Out of respect for other patients and providers, you may be asked to reschedule your appointment if you arrive later than your scheduled appointment time.  Appointments cancelled less than 24hrs in advance will be considered a no show. Patients with three missed appointments within 1 year or four missed appointments within 2 years can be dismissed from the practice. Please be aware that our physicians are required to work in the Intensive Care Unit at [facility].  Your appointment may need to be rescheduled if they are designated to work during your appointment time. You may receive a survey regarding the care you received during your visit. Your input is valuable to us. We encourage you to complete and return your survey. We hope you will choose us in the future for your healthcare needs. Pt instructed of all future appointment dates & times, including radiology, labs, procedures & referrals. If procedures were scheduled preparation instructions provided. Instructions on future appointments with [facility] were given.

## 2022-11-17 NOTE — LETTER
[facility] Critical Care and Sleep  [address] [facility] [address] [address]  Phone: [phone]  Fax: [phone]    Landen Shin MD        November 17, 2022     Patient: Quiana Sorto   YOB: 1945   Date of Visit: 11/17/2022 11/17/22        Quiana Sorto      I have seen this patient in the office today and wanted to communicate my findings and recommendations. Patient Instructions            ASSESSMENT/PLAN:  1. Lung infiltrate on CT  2. Chronic cough  3. KIANNA (obstructive sleep apnea)  4. SOB (shortness of breath)  5. Gastroesophageal reflux disease, unspecified whether esophagitis present      CT scan done 11/19/2021  Impression   Patchy multifocal bilateral consolidation, greatest within the lower lobes,   compatible with pneumonia. Both typical and atypical etiologies, including   viral pneumonia could be considered. Follow-up to resolution is recommended. Chest x-ray done 11/17/2021  Impression   Interval improvement in the right basilar infiltrate, when compared to the   previous study performed 10/12/2021. Findings indicating a superior segment   right lower lobe pneumonia. Noncontrast CT scan of the chest can be   performed for further evaluation. COPD again identified. .     Chest x-ray done 10/12/2021  Impression   Right basilar airspace disease. Covid testing done on 10/7/2021, 10/12/2021, 11/18/2021-all negative        Stress test on 7/6/2021   Conclusions        Summary    Excellent functional capacity at 10 METs    Normal LV function. There is nearly uniform isotope uptake at stress and rest. There is no clear    evidence of myocardial ischemia or scar. Stress Protocols         Lung infiltrates  Repeat chest x-ray done 2/9/2022    Impression   Mild-to-moderate progression of bilateral pulmonary infiltrates compared to   previous exam of December 6, 2021.   Findings remain concerning for an   evolving atypical pneumonia. Chest x-ray done 5/11/2022  FINDINGS:   Clear lungs. No pleural effusion or pneumothorax. Cardiomediastinal   silhouette is unremarkable. Visualized osseous structures are unremarkable. Impression   Clear lungs. Cough   Work up      PFT and methacholine done 12/16/2021  DATE OF PROCEDURE:  12/16/2021     Full PFT done. FEV1 1.97, 78% predicted; FVC of 2.64, 79% predicted;  FEV1 to FVC ratio of 75% showing no obstructive lung defect. Lung  volumes do show a moderate restrictive lung defect with air trapping. No hyperinflation. Diffusion is normal when adjusted for alveolar  ventilation. IMPRESSION:  Normal spirometry. Moderate restrictive lung defect with  air trapping and diffusion is normal when adjusted for alveolar  ventilation. Flow-volume loops are normal.        Stopped the   symbicort as this seemed not really help and was doing better  Was having some issues with hoarseness      Continue   Albuterol as needed  Tessolon Perles 200 mg TID as needed          Cbc with diff-done 12/6/2021 with level of 200  IGG, IGA, IGM- all normal  IGE was 50  RAST done 12/6/2021 + for Alternaria alter Xi-8.88      GERD    Continue with:  prilosec 40 mg    Patient was seen by Dr. Anh Leiva, of GI  EGD done on 2/18/2022 showing multiple gastric polyps, 2 cm hiatal hernia  Polyps pathology was hyperplastic changes, no [place] plastic or dysplastic or malignancy changes            I obstructive sleep apnea  RECOMMENDATIONS:       Advised to avoid driving when too sleepy to function safely and given a discussion of the risks of untreated apnea such as accidents, cognitive impairment, mood impairment, high blood pressure, various cardiac diseases and stroke.            Continue with positional sleep therapy and using wedge at night  No need to use autopap this    Some dry mouth in the morning    [email or web address]        Vaccinations  COVID x2 and booster    RTc in 6 months                   Thank you for allowing me to assist in the care of the MD Brittany Skinner MD

## 2022-11-17 NOTE — PROGRESS NOTES
MA Communication:   The following orders are received by verbal communication from Constantino Reilly MD    Orders include:  6 mo fu scheduled 5/11/23

## 2022-11-17 NOTE — PROGRESS NOTES
Glen Draper (:  1945) is a 78 y.o. female,Established patient, here for evaluation of the following chief complaint(s):  Cough               ASSESSMENT/PLAN:  1. Lung infiltrate on CT  2. Chronic cough  3. KIANNA (obstructive sleep apnea)  4. SOB (shortness of breath)  5. Gastroesophageal reflux disease, unspecified whether esophagitis present      CT scan done 2021  Impression   Patchy multifocal bilateral consolidation, greatest within the lower lobes,   compatible with pneumonia. Both typical and atypical etiologies, including   viral pneumonia could be considered. Follow-up to resolution is recommended. Chest x-ray done 2021  Impression   Interval improvement in the right basilar infiltrate, when compared to the   previous study performed 10/12/2021. Findings indicating a superior segment   right lower lobe pneumonia. Noncontrast CT scan of the chest can be   performed for further evaluation. COPD again identified. .     Chest x-ray done 10/12/2021  Impression   Right basilar airspace disease. Covid testing done on 10/7/2021, 10/12/2021, 2021-all negative        Stress test on 2021   Conclusions        Summary    Excellent functional capacity at 10 METs    Normal LV function. There is nearly uniform isotope uptake at stress and rest. There is no clear    evidence of myocardial ischemia or scar. Stress Protocols         Lung infiltrates  Repeat chest x-ray done 2022    Impression   Mild-to-moderate progression of bilateral pulmonary infiltrates compared to   previous exam of 2021. Findings remain concerning for an   evolving atypical pneumonia. Chest x-ray done 2022  FINDINGS:   Clear lungs. No pleural effusion or pneumothorax. Cardiomediastinal   silhouette is unremarkable. Visualized osseous structures are unremarkable. Impression   Clear lungs.              Cough   Work up      PFT and methacholine done 12/16/2021  DATE OF PROCEDURE:  12/16/2021     Full PFT done. FEV1 1.97, 78% predicted; FVC of 2.64, 79% predicted;  FEV1 to FVC ratio of 75% showing no obstructive lung defect. Lung  volumes do show a moderate restrictive lung defect with air trapping. No hyperinflation. Diffusion is normal when adjusted for alveolar  ventilation. IMPRESSION:  Normal spirometry. Moderate restrictive lung defect with  air trapping and diffusion is normal when adjusted for alveolar  ventilation. Flow-volume loops are normal.        Stopped the   symbicort as this seemed not really help and was doing better  Was having some issues with hoarseness      Continue   Albuterol as needed  Tessolon Perles 200 mg TID as needed          Cbc with diff-done 12/6/2021 with level of 200  IGG, IGA, IGM- all normal  IGE was 50  RAST done 12/6/2021 + for Alternaria alter Xi-8.88      GERD    Continue with:  prilosec 40 mg    Patient was seen by Dr. Candace Abarca, of GI  EGD done on 2/18/2022 showing multiple gastric polyps, 2 cm hiatal hernia  Polyps pathology was hyperplastic changes, no [place] plastic or dysplastic or malignancy changes            I obstructive sleep apnea  RECOMMENDATIONS:       Advised to avoid driving when too sleepy to function safely and given a discussion of the risks of untreated apnea such as accidents, cognitive impairment, mood impairment, high blood pressure, various cardiac diseases and stroke. Continue with positional sleep therapy and using wedge at night  No need to use autopap this    Some dry mouth in the morning    [email or web address]        Vaccinations  COVID x2 and booster    RTc in 6  months        No follow-ups on file. I have personally reviewed and summarized the old records   I have independently reviewed the images and reviewed with patient    I have reviewed the lab tests, radiology reports and medications      Reviewed present meds and side effects. Continue present meds.   Stay accompanied by leg jerks during sleep, numbness in legs or feet, aches/burning/cramps in legs, feet. does report having nasal congestion. negative for use of nasal sprays, nose or sinus surgery. negative for broken nose, tonsillectomy, sleeping w/ chest raised. Does not sleep with oxygen. Pt does not have a dental appliance or braces on teeth. does teeth grinding. Does not report nightmares, sleep walking, dreaming during naps. When angry or laughing Jesusita Alfred does not report cataplexy. she does not report hallucinations when dozing off or immediately upon awakening. Does not report sleep paralysis. Does not have parasomnia. Patient's Winfred Sleepiness score  is required. Patient  Is CONSISTENT with moderate daytime sleepiness. evaluation by a stomach doctor at any time. Overall has been doing well  Breathing has been good  And cough is better    Still waking up at night with dry mouth  Using wedge  Insomnia since  passing  More issue of depression      Very active  No sob  And cutting grass        Since last visit  Insomnia more an issue since passing of   Was given trazadone      Cough is better  Not using albuterol and tessolon        Cough  This is a new problem. The current episode started more than 1 month ago. The problem has been waxing and waning. The cough is Non-productive. Associated symptoms include ear pain. Pertinent negatives include no chest pain, chills, ear congestion, fever, headaches, heartburn, hemoptysis, myalgias, nasal congestion, postnasal drip, rash, rhinorrhea, sore throat, shortness of breath, sweats, weight loss or wheezing. Review of Systems   Constitutional: Negative. Negative for chills, fever and weight loss. HENT:  Positive for ear pain. Negative for postnasal drip, rhinorrhea and sore throat. Eyes: Negative. Respiratory:  Positive for cough. Negative for hemoptysis, shortness of breath and wheezing. Cardiovascular: Negative. Negative for chest pain. Gastrointestinal: Negative. Negative for heartburn. Endocrine: Negative. Genitourinary: Negative. Musculoskeletal: Negative. Negative for myalgias. Skin: Negative. Negative for rash. Allergic/Immunologic: Negative. Neurological: Negative. Negative for headaches. Hematological: Negative. Psychiatric/Behavioral: Negative. vitamin  Vitals:    11/17/22 1257   BP: 105/65   Site: Left Upper Arm   Position: Sitting   Cuff Size: Medium Adult   Pulse: 78   Resp: 16   Temp: 98.9 °F (37.2 °C)   TempSrc: Temporal   SpO2: 100%   Weight: 173 lb (78.5 kg)   Height: 5' 7.5\" (1.715 m)       Objective   Physical Exam  Vitals and nursing note reviewed. Constitutional:       General: She is not in acute distress. Appearance: Normal appearance. She is not ill-appearing. HENT:      Head: Normocephalic and atraumatic. Right Ear: External ear normal.      Left Ear: External ear normal.      Nose: Nose normal.      Mouth/Throat:      Mouth: Mucous membranes are moist.      Pharynx: Oropharynx is clear. Comments: Mallampati 2  Eyes:      General: No scleral icterus. Extraocular Movements: Extraocular movements intact. Conjunctiva/sclera: Conjunctivae normal.      Pupils: Pupils are equal, round, and reactive to light. Cardiovascular:      Rate and Rhythm: Normal rate and regular rhythm. Pulses: Normal pulses. Heart sounds: Normal heart sounds. No murmur heard. No friction rub. Pulmonary:      Effort: No respiratory distress. Breath sounds: No stridor. No wheezing, rhonchi or rales. Abdominal:      General: Abdomen is flat. Bowel sounds are normal. There is no distension. Tenderness: There is no abdominal tenderness. There is no guarding. Musculoskeletal:         General: No swelling or tenderness. Normal range of motion. Cervical back: Normal range of motion and neck supple. No rigidity. Skin:     General: Skin is warm and dry. Coloration: Skin is not jaundiced. Neurological:      General: No focal deficit present. Mental Status: She is alert and oriented to person, place, and time. Mental status is at baseline. Cranial Nerves: No cranial nerve deficit. Sensory: No sensory deficit. Motor: No weakness. Gait: Gait normal.   Psychiatric:         Mood and Affect: Mood normal.         Thought Content:  Thought content normal.         Judgment: Judgment normal.                An electronic signature was used to authenticate this note.    --Brittany Sanz MD

## 2023-01-01 DIAGNOSIS — E78.5 ELEVATED LIPIDS: ICD-10-CM

## 2023-01-01 DIAGNOSIS — K21.9 GASTROESOPHAGEAL REFLUX DISEASE, UNSPECIFIED WHETHER ESOPHAGITIS PRESENT: Primary | ICD-10-CM

## 2023-01-01 DIAGNOSIS — E03.9 ACQUIRED HYPOTHYROIDISM: ICD-10-CM

## 2023-01-01 RX ORDER — ATORVASTATIN CALCIUM 10 MG/1
TABLET, FILM COATED ORAL
Qty: 90 TABLET | Refills: 1 | Status: SHIPPED | OUTPATIENT
Start: 2023-01-01

## 2023-01-01 RX ORDER — LEVOTHYROXINE SODIUM 0.07 MG/1
TABLET ORAL
Qty: 90 TABLET | Refills: 1 | Status: SHIPPED | OUTPATIENT
Start: 2023-01-01

## 2023-01-04 RX ORDER — OMEPRAZOLE 40 MG/1
40 CAPSULE, DELAYED RELEASE ORAL
Qty: 90 CAPSULE | Refills: 3 | Status: SHIPPED | OUTPATIENT
Start: 2023-01-04

## 2023-03-09 LAB
ALBUMIN SERPL-MCNC: 4.3 G/DL
ALP BLD-CCNC: 109 U/L
ALT SERPL-CCNC: 11 U/L
ANION GAP SERPL CALCULATED.3IONS-SCNC: 1.7 MMOL/L
AST SERPL-CCNC: 13 U/L
BASOPHILS ABSOLUTE: 0.1 /ΜL
BASOPHILS RELATIVE PERCENT: 1 %
BILIRUB SERPL-MCNC: 0.3 MG/DL (ref 0.1–1.4)
BUN BLDV-MCNC: 17 MG/DL
CALCIUM SERPL-MCNC: 9.8 MG/DL
CHLORIDE BLD-SCNC: 100 MMOL/L
CHOLESTEROL, TOTAL: 183 MG/DL
CHOLESTEROL/HDL RATIO: 0
CO2: 23 MMOL/L
CREAT SERPL-MCNC: 0.82 MG/DL
EGFR: 75
EOSINOPHILS ABSOLUTE: 0.1 /ΜL
EOSINOPHILS RELATIVE PERCENT: 2 %
GLUCOSE BLD-MCNC: 91 MG/DL
HCT VFR BLD CALC: 36.2 % (ref 36–46)
HDLC SERPL-MCNC: 84 MG/DL (ref 35–70)
HEMOGLOBIN: 11.8 G/DL (ref 12–16)
LDL CHOLESTEROL CALCULATED: 87 MG/DL (ref 0–160)
LYMPHOCYTES ABSOLUTE: 2 /ΜL
LYMPHOCYTES RELATIVE PERCENT: 35 %
MCH RBC QN AUTO: 27.6 PG
MCHC RBC AUTO-ENTMCNC: 32.6 G/DL
MCV RBC AUTO: 85 FL
MONOCYTES ABSOLUTE: 0.5 /ΜL
MONOCYTES RELATIVE PERCENT: 8 %
NEUTROPHILS ABSOLUTE: 3 /ΜL
NEUTROPHILS RELATIVE PERCENT: 54 %
NONHDLC SERPL-MCNC: 0 MG/DL
PLATELET # BLD: 276 K/ΜL
PMV BLD AUTO: 0 FL
POTASSIUM SERPL-SCNC: 4.5 MMOL/L
RBC # BLD: 4.28 10^6/ΜL
SODIUM BLD-SCNC: 138 MMOL/L
TOTAL PROTEIN: 6.9
TRIGL SERPL-MCNC: 62 MG/DL
VLDLC SERPL CALC-MCNC: 12 MG/DL
WBC # BLD: 5.7 10^3/ML

## 2023-03-09 NOTE — TELEPHONE ENCOUNTER
Decided not to add Marni Camacho
If Dr Linda is going to be around today, let's see what his thoughts are on this patient. If not, I will review further when I have a moment.
Patient informed.
Phone call to patient. Chart reviewed. 10/13/2021 virtual visit DX acute bronchitis treated with Zithromax, Medrol dose pack, Tussi-Organidin. Secondary to decreased oxygen level patient went to the emergency room same day: Chest x-ray right basilar disease. Covid test negative. Treatment: Suggested finished present medicine. Seen to be doing somewhat better but then worsening of cough and feels she has a fever. Treat: Levaquin, albuterol MDI, Medrol Dosepak, Tussi-Organidin.   Call if no improvement  Dr. Nikki Velasquez
Please call patient. 1.  Added Medrol Dosepak. Emailed to the pharmacy   2. I would wait 30 days after recovered from pneumonia before getting Covid booster vaccine.   Dr. Frederick Bar Nunn
Pt called stating that after her virtual visit with you last week she did go to the ER and was diagnosed with pneumonia, she did finish the antibiotics and steroid that you prescribed to her. She said that she is still having the cough and chest congestion would like to know what to do. She also has a procedure coming up on Tuesday and would like to know if you think that she should post pone it until she gets better.   Please Advise
Pt stated she was told to call today to say how she is doing. Pt still having a lot of phlegm and can't take full breaths. Nauseous. No appetite. Pt believes antibiotic is helping some, but still experiencing symptoms. Please contact pt at [phone]. Pt also wanted to discuss how soon she can get covid booster shot after she feels better.
no

## 2023-03-14 ENCOUNTER — OFFICE VISIT (OUTPATIENT)
Dept: INTERNAL MEDICINE CLINIC | Age: 78
End: 2023-03-14
Payer: MEDICARE

## 2023-03-14 VITALS
HEART RATE: 72 BPM | TEMPERATURE: 97.2 F | WEIGHT: 178 LBS | DIASTOLIC BLOOD PRESSURE: 60 MMHG | OXYGEN SATURATION: 96 % | SYSTOLIC BLOOD PRESSURE: 118 MMHG | BODY MASS INDEX: 27.47 KG/M2

## 2023-03-14 DIAGNOSIS — F32.4 MAJOR DEPRESSIVE DISORDER IN PARTIAL REMISSION, UNSPECIFIED WHETHER RECURRENT (HCC): ICD-10-CM

## 2023-03-14 DIAGNOSIS — J45.20 MILD INTERMITTENT ASTHMA WITHOUT COMPLICATION: ICD-10-CM

## 2023-03-14 DIAGNOSIS — D64.9 ANEMIA, UNSPECIFIED TYPE: ICD-10-CM

## 2023-03-14 DIAGNOSIS — Z13.1 DIABETES MELLITUS SCREENING: ICD-10-CM

## 2023-03-14 DIAGNOSIS — E03.9 ACQUIRED HYPOTHYROIDISM: ICD-10-CM

## 2023-03-14 DIAGNOSIS — E78.5 ELEVATED LIPIDS: Primary | ICD-10-CM

## 2023-03-14 DIAGNOSIS — K31.7 HYPERPLASTIC POLYPS OF STOMACH: ICD-10-CM

## 2023-03-14 PROCEDURE — G8417 CALC BMI ABV UP PARAM F/U: HCPCS | Performed by: NURSE PRACTITIONER

## 2023-03-14 PROCEDURE — G8399 PT W/DXA RESULTS DOCUMENT: HCPCS | Performed by: NURSE PRACTITIONER

## 2023-03-14 PROCEDURE — 1123F ACP DISCUSS/DSCN MKR DOCD: CPT | Performed by: NURSE PRACTITIONER

## 2023-03-14 PROCEDURE — 3017F COLORECTAL CA SCREEN DOC REV: CPT | Performed by: NURSE PRACTITIONER

## 2023-03-14 PROCEDURE — 99214 OFFICE O/P EST MOD 30 MIN: CPT | Performed by: NURSE PRACTITIONER

## 2023-03-14 PROCEDURE — 1036F TOBACCO NON-USER: CPT | Performed by: NURSE PRACTITIONER

## 2023-03-14 PROCEDURE — G8427 DOCREV CUR MEDS BY ELIG CLIN: HCPCS | Performed by: NURSE PRACTITIONER

## 2023-03-14 PROCEDURE — G8484 FLU IMMUNIZE NO ADMIN: HCPCS | Performed by: NURSE PRACTITIONER

## 2023-03-14 PROCEDURE — 1090F PRES/ABSN URINE INCON ASSESS: CPT | Performed by: NURSE PRACTITIONER

## 2023-03-14 RX ORDER — ESCITALOPRAM OXALATE 5 MG/1
5 TABLET ORAL DAILY
Qty: 30 TABLET | Refills: 1 | Status: SHIPPED | OUTPATIENT
Start: 2023-03-14

## 2023-03-14 SDOH — ECONOMIC STABILITY: HOUSING INSECURITY
IN THE LAST 12 MONTHS, WAS THERE A TIME WHEN YOU DID NOT HAVE A STEADY PLACE TO SLEEP OR SLEPT IN A SHELTER (INCLUDING NOW)?: NO

## 2023-03-14 SDOH — ECONOMIC STABILITY: FOOD INSECURITY: WITHIN THE PAST 12 MONTHS, YOU WORRIED THAT YOUR FOOD WOULD RUN OUT BEFORE YOU GOT MONEY TO BUY MORE.: NEVER TRUE

## 2023-03-14 SDOH — ECONOMIC STABILITY: INCOME INSECURITY: HOW HARD IS IT FOR YOU TO PAY FOR THE VERY BASICS LIKE FOOD, HOUSING, MEDICAL CARE, AND HEATING?: NOT HARD AT ALL

## 2023-03-14 SDOH — ECONOMIC STABILITY: FOOD INSECURITY: WITHIN THE PAST 12 MONTHS, THE FOOD YOU BOUGHT JUST DIDN'T LAST AND YOU DIDN'T HAVE MONEY TO GET MORE.: NEVER TRUE

## 2023-03-14 ASSESSMENT — PATIENT HEALTH QUESTIONNAIRE - PHQ9
2. FEELING DOWN, DEPRESSED OR HOPELESS: 1
1. LITTLE INTEREST OR PLEASURE IN DOING THINGS: 1
5. POOR APPETITE OR OVEREATING: 0
7. TROUBLE CONCENTRATING ON THINGS, SUCH AS READING THE NEWSPAPER OR WATCHING TELEVISION: 0
SUM OF ALL RESPONSES TO PHQ QUESTIONS 1-9: 6
SUM OF ALL RESPONSES TO PHQ QUESTIONS 1-9: 6
SUM OF ALL RESPONSES TO PHQ9 QUESTIONS 1 & 2: 2
9. THOUGHTS THAT YOU WOULD BE BETTER OFF DEAD, OR OF HURTING YOURSELF: 0
SUM OF ALL RESPONSES TO PHQ QUESTIONS 1-9: 6
8. MOVING OR SPEAKING SO SLOWLY THAT OTHER PEOPLE COULD HAVE NOTICED. OR THE OPPOSITE, BEING SO FIGETY OR RESTLESS THAT YOU HAVE BEEN MOVING AROUND A LOT MORE THAN USUAL: 0
3. TROUBLE FALLING OR STAYING ASLEEP: 3
10. IF YOU CHECKED OFF ANY PROBLEMS, HOW DIFFICULT HAVE THESE PROBLEMS MADE IT FOR YOU TO DO YOUR WORK, TAKE CARE OF THINGS AT HOME, OR GET ALONG WITH OTHER PEOPLE: 0
4. FEELING TIRED OR HAVING LITTLE ENERGY: 1
6. FEELING BAD ABOUT YOURSELF - OR THAT YOU ARE A FAILURE OR HAVE LET YOURSELF OR YOUR FAMILY DOWN: 0
SUM OF ALL RESPONSES TO PHQ QUESTIONS 1-9: 6

## 2023-03-14 ASSESSMENT — ENCOUNTER SYMPTOMS
COUGH: 0
NAUSEA: 0
BLOOD IN STOOL: 0
EYE DISCHARGE: 0
DIARRHEA: 0
SHORTNESS OF BREATH: 0
ABDOMINAL PAIN: 0
VOMITING: 0
WHEEZING: 0
CONSTIPATION: 0

## 2023-03-14 NOTE — PROGRESS NOTES
03/14/23    [facility]  78 y.o.  female      Chief Complaint   Patient presents with    Establish Care         HPI:   Pt presents to establish with me following the group home of Dr. Erwin Conn, and to evaluate:    HLD: Total C 183; HDL 84; LDL 87; Trigs 62 on Lipitor 10 mg    Hypothyroidism TSH 1.830 on levothyroxine 75 mcg daily    Anemia: past hx of anemia. Current labs with H&H of 11.8/36.2    Major Depressive disorder  Had treatment in the past, but has not felt the need of it for the past few years. However, right now she is trying to sort out house contents for downsizing. She is still in the house where she and her  reared their children. It is nearing the anniversary of his death and with all sorting and memories she would like to have some help again. Hyperplastic polyps of stomach: No current problems or treatment  Mild intermittent asthma without complication  GERD: stopped prilosec due to leg cramps which resolved after stopping med. GERD well controlled. She states that during a period of lung issues with recurrent pneumonia the GI doc started her on med.         Lab Results   Component Value Date    CHOL 186 09/09/2022    CHOL 179 03/04/2022    CHOL 179 09/07/2021     Lab Results   Component Value Date    TRIG 71 09/09/2022    TRIG 48 03/04/2022    TRIG 56 09/07/2021     Lab Results   Component Value Date    HDL 81 09/09/2022    HDL 88 (H) 03/04/2022    HDL 82 09/07/2021     Lab Results   Component Value Date    LDLCALC 92 09/09/2022    LDLCALC 81 03/04/2022    LDLCALC 86 09/07/2021     Lab Results   Component Value Date    LABVLDL 13 09/09/2022    LABVLDL 10 03/04/2022    LABVLDL 11 09/07/2021    VLDL 11 12/01/2014     No results found for: CHOLHDLRATIO     Lab Results   Component Value Date     09/09/2022    K 4.5 09/09/2022     09/09/2022    CO2 24 09/09/2022    BUN 16 09/09/2022    CREATININE 0.97 09/09/2022    GLUCOSE 92 09/09/2022    CALCIUM 9.9 09/09/2022    PROT 6.9 09/09/2022    LABALBU 4.5 09/09/2022    BILITOT 0.4 09/09/2022    ALKPHOS 109 09/09/2022    AST 17 09/09/2022    ALT 11 09/09/2022    LABGLOM >60 03/04/2022    GFRAA >60 03/04/2022    AGRATIO 1.9 09/09/2022    GLOB 2.4 09/09/2022       Lab Results   Component Value Date    LABA1C 6.0 06/30/2021     Lab Results   Component Value Date    .5 06/30/2021          1. Elevated lipids  Reviewed lab results with patient  Continue lipitor 10 mg daily    2. Acquired hypothyroidism  Reviewed lab results with patient  Continue levothyroxine  75 mcg daily    3. Anemia, unspecified type  Anemia has resolved    4. Major depressive disorder in partial remission, unspecified whether recurrent ([facility] [address]  Will start lexapro 5 mg  She will update progress to me in a few weeks  Will consider titration when I hear from her    5. Hyperplastic polyps of stomach  A symptomatic  Monitor    6. Mild intermittent asthma without complication  No current problems  Continue to have rescue inhaler available  Monitor    Update labs in 6 months, one week before scheduled OV.        /60   Pulse 72   Temp 97.2 °F (36.2 °C)   Wt 178 lb (80.7 kg)   SpO2 96%   BMI 27.47 kg/m²     Current Outpatient Medications   Medication Sig Dispense Refill    escitalopram (LEXAPRO) 5 MG tablet Take 1 tablet by mouth daily 30 tablet 1    atorvastatin (LIPITOR) 10 MG tablet TAKE 1 TABLET DAILY FOR HIGH CHOLESTEROL 90 tablet 1    levothyroxine (SYNTHROID) 75 MCG tablet TAKE 1 TABLET EVERY DAY (THYROID MEDICINE) 90 tablet 1    magnesium 30 MG tablet Take 30 mg by mouth daily       zinc sulfate (ZINCATE) 220 (50 Zn) MG capsule Take 50 mg by mouth daily      Cholecalciferol (VITAMIN D) 50 MCG (2000 UT) CAPS capsule Take by mouth daily       dextromethorphan-guaiFENesin (MUCINEX DM)  MG per extended release tablet Take 1 tablet by mouth every 12 hours as needed       albuterol sulfate HFA (PROVENTIL HFA) 108 (90 Base) MCG/ACT inhaler Inhale 2 puffs into the lungs every 6 hours as needed for Wheezing 18 g 1    calcium carbonate (OSCAL) 500 MG TABS tablet Take 500 mg by mouth daily      Biotin w/ Vitamins C & E (HAIR/SKIN/NAILS PO) Take by mouth daily       No current facility-administered medications for this visit. Social History     Socioeconomic History    Marital status:      Spouse name: Not on file    Number of children: Not on file    Years of education: Not on file    Highest education level: Not on file   Occupational History    Not on file   Tobacco Use    Smoking status: Former     Packs/day: 0.50     Years: 6.00     Pack years: 3.00     Types: Cigarettes     Start date: 1966     Quit date: 1972     Years since quittin.2     Passive exposure: Past    Smokeless tobacco: Never   Vaping Use    Vaping Use: Never used   Substance and Sexual Activity    Alcohol use: Yes     Comment: 2 drinks per month    Drug use: No    Sexual activity: Not on file   Other Topics Concern    Not on file   Social History Narrative    Not on file     Social Determinants of Health     Financial Resource Strain: Low Risk     Difficulty of Paying Living Expenses: Not hard at all   Food Insecurity: No Food Insecurity    Worried About [address] in the Last Year: Never true    [address] in the Last Year: Never true   Transportation Needs: Unknown    Lack of Transportation (Medical): Not on file    Lack of Transportation (Non-Medical):  No   Physical Activity: Not on file   Stress: Not on file   Social Connections: Not on file   Intimate Partner Violence: Not on file   Housing Stability: Unknown    Unable to Pay for Housing in the Last Year: Not on file    Number of Places Lived in the Last Year: Not on file    Unstable Housing in the Last Year: No       Family History   Problem Relation Age of Onset    Heart Disease Mother     High Blood Pressure Mother     High Cholesterol Mother     Substance Abuse Mother         Tob. use    Cancer Mother basal cell    Emphysema Mother     Heart Failure Mother     Hypertension Mother     Diabetes Father     Alcohol Abuse Father     Arrhythmia Father     Hypertension Father     Cancer Father         bladder    Asthma Sister     Cancer Maternal Aunt     Heart Failure Maternal Grandmother     Emphysema Maternal Grandfather        Past Medical History:   Diagnosis Date    Anemia     Anxiety     Arthritis     Asthma '96    PFT's    Back strain     Bell's palsy 04/2018    Rt Side    Chest pain 08/05/2016    GXT: Normal.    Chronic back pain     Cystitis     Depression     Dermatitis     Fibroids     GERD (gastroesophageal reflux disease)     Hematuria '00    Cystoscopy & IVP  (---)    History of recurrent UTIs     Hyperlipidemia     Hypothyroidism 06/05    Insomnia     Irritable bowel syndrome     Palpitations     Pneumonia 11/93    Sleep apnea     mild-uses a wedge at night       Review of Systems   Constitutional:  Negative for fever. HENT:  Negative for congestion. Eyes:  Negative for discharge. Respiratory:  Negative for cough, shortness of breath and wheezing. Cardiovascular:  Negative for chest pain, palpitations and leg swelling. Gastrointestinal:  Negative for abdominal pain, blood in stool, constipation, diarrhea, nausea and vomiting. Genitourinary:  Negative for dysuria and hematuria. Musculoskeletal:  Negative for myalgias. Skin:  Negative for rash. Neurological:  Negative for dizziness. Psychiatric/Behavioral:  The patient is not nervous/anxious. Situational stress     Physical Exam  Constitutional:       General: She is not in acute distress. Appearance: She is not diaphoretic. HENT:      Right Ear: External ear normal.      Left Ear: External ear normal.      Mouth/Throat:      Pharynx: No oropharyngeal exudate. Eyes:      General: No scleral icterus. Right eye: No discharge. Left eye: No discharge. Neck:      Thyroid: No thyromegaly.    Cardiovascular: Rate and Rhythm: Normal rate and regular rhythm. Heart sounds: Normal heart sounds. No murmur heard. No friction rub. No gallop. Pulmonary:      Effort: Pulmonary effort is normal.      Breath sounds: Normal breath sounds. No wheezing. Abdominal:      General: Bowel sounds are normal. There is no distension. Palpations: Abdomen is soft. Tenderness: There is no abdominal tenderness. Musculoskeletal:         General: No tenderness. Normal range of motion. Cervical back: Normal range of motion. Lymphadenopathy:      Cervical: No cervical adenopathy. Skin:     General: Skin is warm and dry. Findings: No erythema or rash. Neurological:      Mental Status: She is alert and oriented to person, place, and time.    Psychiatric:         Judgment: Judgment normal.              Claudette De Jesus, CASI - CNP

## 2023-03-29 ENCOUNTER — PATIENT MESSAGE (OUTPATIENT)
Dept: INTERNAL MEDICINE CLINIC | Age: 78
End: 2023-03-29

## 2023-03-29 RX ORDER — ESCITALOPRAM OXALATE 5 MG/1
5 TABLET ORAL DAILY
Qty: 90 TABLET | Refills: 3 | Status: SHIPPED | OUTPATIENT
Start: 2023-03-29

## 2023-03-29 NOTE — TELEPHONE ENCOUNTER
From: Danny Holland  To: Rebekah Garcia  Sent: 3/29/2023 3:22 PM EDT  Subject: Escitalopram 5 mg    You asked me to let you know how I was doing on the above medication. I find that I am actually sleeping better and I dont feel so weepy. Could you please send a 3 month prescription to [facility] [place] pharmacy for me.     Thanks,  Duy GOLD 1945

## 2023-05-11 ENCOUNTER — OFFICE VISIT (OUTPATIENT)
Dept: PULMONOLOGY | Age: 78
End: 2023-05-11
Payer: MEDICARE

## 2023-05-11 VITALS
WEIGHT: 168 LBS | OXYGEN SATURATION: 98 % | SYSTOLIC BLOOD PRESSURE: 122 MMHG | BODY MASS INDEX: 25.46 KG/M2 | RESPIRATION RATE: 16 BRPM | HEIGHT: 68 IN | HEART RATE: 87 BPM | DIASTOLIC BLOOD PRESSURE: 64 MMHG

## 2023-05-11 DIAGNOSIS — J45.20 MILD INTERMITTENT ASTHMA WITHOUT COMPLICATION: Primary | ICD-10-CM

## 2023-05-11 DIAGNOSIS — K21.9 GASTROESOPHAGEAL REFLUX DISEASE WITHOUT ESOPHAGITIS: ICD-10-CM

## 2023-05-11 DIAGNOSIS — G47.33 OSA (OBSTRUCTIVE SLEEP APNEA): ICD-10-CM

## 2023-05-11 PROCEDURE — 99214 OFFICE O/P EST MOD 30 MIN: CPT | Performed by: NURSE PRACTITIONER

## 2023-05-11 PROCEDURE — 1123F ACP DISCUSS/DSCN MKR DOCD: CPT | Performed by: NURSE PRACTITIONER

## 2023-05-11 PROCEDURE — G8417 CALC BMI ABV UP PARAM F/U: HCPCS | Performed by: NURSE PRACTITIONER

## 2023-05-11 PROCEDURE — G8399 PT W/DXA RESULTS DOCUMENT: HCPCS | Performed by: NURSE PRACTITIONER

## 2023-05-11 PROCEDURE — 3017F COLORECTAL CA SCREEN DOC REV: CPT | Performed by: NURSE PRACTITIONER

## 2023-05-11 PROCEDURE — G8427 DOCREV CUR MEDS BY ELIG CLIN: HCPCS | Performed by: NURSE PRACTITIONER

## 2023-05-11 PROCEDURE — 1090F PRES/ABSN URINE INCON ASSESS: CPT | Performed by: NURSE PRACTITIONER

## 2023-05-11 PROCEDURE — 1036F TOBACCO NON-USER: CPT | Performed by: NURSE PRACTITIONER

## 2023-05-11 ASSESSMENT — ENCOUNTER SYMPTOMS
RHINORRHEA: 0
SHORTNESS OF BREATH: 0
CHEST TIGHTNESS: 0
NAUSEA: 0
EYE PAIN: 0
DIARRHEA: 0
WHEEZING: 0
ABDOMINAL PAIN: 0
SORE THROAT: 0
VOMITING: 0
COUGH: 0

## 2023-05-11 NOTE — PROGRESS NOTES
MA Communication:   The following orders are received by verbal communication from Jam Jarrett, [address]     Return to clinic in 1 year with Dr. Dylon Dwyer   [id number]  Appointment time 9:30am
Negative for chest pain, palpitations and leg swelling. Gastrointestinal:  Negative for abdominal pain, diarrhea, nausea and vomiting. Genitourinary:  Negative for difficulty urinating. Musculoskeletal: Negative. Skin: Negative. Neurological:  Negative for dizziness, numbness and headaches. Psychiatric/Behavioral:  The patient is not nervous/anxious. /64 (Site: Left Upper Arm, Position: Sitting, Cuff Size: Small Adult)   Pulse 87   Resp 16   Ht 5' 7.5\" (1.715 m)   Wt 168 lb (76.2 kg)   SpO2 98%   BMI 25.92 kg/m²       Imaging /Test:   PFT and methacholine done 12/16/2021  DATE OF PROCEDURE:  12/16/2021     Full PFT done. FEV1 1.97, 78% predicted; FVC of 2.64, 79% predicted;  FEV1 to FVC ratio of 75% showing no obstructive lung defect. Lung  volumes do show a moderate restrictive lung defect with air trapping. No hyperinflation. Diffusion is normal when adjusted for alveolar  ventilation. IMPRESSION:  Normal spirometry. Moderate restrictive lung defect with  air trapping and diffusion is normal when adjusted for alveolar  ventilation. Flow-volume loops are normal.    5/11/2022 9:33 am     COMPARISON:  Chest x-ray dated 02/09/2022     HISTORY:  ORDERING SYSTEM PROVIDED HISTORY: Lung infiltrate on CT  TECHNOLOGIST PROVIDED HISTORY:  Reason for Exam: f/u lung infiltrate(CT)  Additional signs and symptoms: pt states having unresolved pneumonia since  Oct 2021  Relevant Medical/Surgical History: hx of mild asthma since childhood     FINDINGS:  Clear lungs. No pleural effusion or pneumothorax. Cardiomediastinal  silhouette is unremarkable. Visualized osseous structures are unremarkable. IMPRESSION:  Clear lungs. Physical Exam  Vitals reviewed. Constitutional:       General: She is not in acute distress. Appearance: Normal appearance. She is not ill-appearing, toxic-appearing or diaphoretic. HENT:      Head: Normocephalic and atraumatic.       Nose: Nose normal. No

## 2023-05-11 NOTE — PATIENT INSTRUCTIONS
Call with worsening symptoms such as increased shortness of breath, productive cough, wheezing or symptoms not responding to treatment plan. Your current pulmonary medications are controlling/treating your asthma. Stay compliant. Reviewed present pulmonary medications and side effects. Reviewed proper inhaler usage. Return to clinic in 1 year with Dr. Christal Carmona   [id number]  Appointment time 9:30am   Remember to bring a list of pulmonary medications and any CPAP or BiPAP machines to your next appointment with the office. Please keep all of your future appointments scheduled by [facility] office. Out of respect for other patients and providers, you may be asked to reschedule your appointment if you arrive later than your scheduled appointment time. Appointments cancelled less than 24hrs in advance will be considered a no show. Patients with three missed appointments within 1 year or four missed appointments within 2 years can be dismissed from the practice. Please be aware that our physicians are required to work in the Intensive Care Unit at [facility].  Your appointment may need to be rescheduled if they are designated to work during your appointment time. You may receive a survey regarding the care you received during your visit. Your input is valuable to us. We encourage you to complete and return your survey. We hope you will choose us in the future for your healthcare needs. Pt instructed of all future appointment dates & times, including radiology, labs, procedures & referrals. If procedures were scheduled preparation instructions provided. Instructions on future appointments with [facility] were given. MA Communication:   The following orders are received by verbal communication from Mami Rico, [address]     Return to clinic in 1 year with Dr. Christal Carmona   [id number]  Appointment time 9:30am

## 2023-09-06 LAB
A/G RATIO: 1.8 (ref 1.2–2.2)
ALBUMIN SERPL-MCNC: 4.5 G/DL (ref 3.8–4.8)
ALP BLD-CCNC: 99 IU/L (ref 44–121)
ALT SERPL-CCNC: 11 IU/L (ref 0–32)
AST SERPL-CCNC: 15 IU/L (ref 0–40)
BASOPHILS ABSOLUTE: 0.1 X10E3/UL (ref 0–0.2)
BASOPHILS RELATIVE PERCENT: 1 %
BILIRUB SERPL-MCNC: 0.4 MG/DL (ref 0–1.2)
BUN / CREAT RATIO: 18 (ref 12–28)
BUN BLDV-MCNC: 15 MG/DL (ref 8–27)
CALCIUM SERPL-MCNC: 9.7 MG/DL (ref 8.7–10.3)
CHLORIDE BLD-SCNC: 99 MMOL/L (ref 96–106)
CHOLESTEROL, TOTAL: 180 MG/DL (ref 100–199)
CO2: 24 MMOL/L (ref 20–29)
COMMENT: NORMAL
CREAT SERPL-MCNC: 0.83 MG/DL (ref 0.57–1)
EOSINOPHILS ABSOLUTE: 0.1 X10E3/UL (ref 0–0.4)
EOSINOPHILS RELATIVE PERCENT: 2 %
ERYTHROCYTES, NUCLEATED/100 LEU: NORMAL
GLOBULIN: 2.5 G/DL (ref 1.5–4.5)
GLUCOSE BLD-MCNC: 97 MG/DL (ref 70–99)
HCT VFR BLD CALC: 35.1 % (ref 34–46.6)
HDLC SERPL-MCNC: 94 MG/DL
HEMOGLOBIN: 11.3 G/DL (ref 11.1–15.9)
IMMATURE CELLS ABSOLUTE COUNT: NORMAL
IMMATURE GRANS (ABS): 0 X10E3/UL (ref 0–0.1)
IMMATURE GRANULOCYTES: 0 %
LDL CHOLESTEROL CALCULATED: 74 MG/DL (ref 0–99)
LYMPHOCYTES ABSOLUTE: 1.9 X10E3/UL (ref 0.7–3.1)
LYMPHOCYTES RELATIVE PERCENT: 31 %
MCH RBC QN AUTO: 28 PG (ref 26.6–33)
MCHC RBC AUTO-ENTMCNC: 32.2 G/DL (ref 31.5–35.7)
MCV RBC AUTO: 87 FL (ref 79–97)
MONOCYTES ABSOLUTE: 0.5 X10E3/UL (ref 0.1–0.9)
MONOCYTES RELATIVE PERCENT: 8 %
MORPHOLOGY: NORMAL
NEUTROPHILS ABSOLUTE: 3.6 X10E3/UL (ref 1.4–7)
PDW BLD-RTO: 13.4 % (ref 11.7–15.4)
PLATELET # BLD: 316 X10E3/UL (ref 150–450)
POTASSIUM SERPL-SCNC: 4.7 MMOL/L (ref 3.5–5.2)
RBC # BLD: 4.03 X10E6/UL (ref 3.77–5.28)
SEGMENTED NEUTROPHILS RELATIVE PERCENT: 58 %
SODIUM BLD-SCNC: 135 MMOL/L (ref 134–144)
TOTAL PROTEIN: 7 G/DL (ref 6–8.5)
TRIGL SERPL-MCNC: 62 MG/DL (ref 0–149)
VLDLC SERPL CALC-MCNC: 12 MG/DL (ref 5–40)
WBC # BLD: 6.2 X10E3/UL (ref 3.4–10.8)

## 2023-09-14 ENCOUNTER — OFFICE VISIT (OUTPATIENT)
Dept: INTERNAL MEDICINE CLINIC | Age: 78
End: 2023-09-14

## 2023-09-14 VITALS
BODY MASS INDEX: 22.88 KG/M2 | HEART RATE: 69 BPM | HEIGHT: 68 IN | OXYGEN SATURATION: 97 % | SYSTOLIC BLOOD PRESSURE: 136 MMHG | DIASTOLIC BLOOD PRESSURE: 80 MMHG | WEIGHT: 151 LBS | TEMPERATURE: 97.2 F

## 2023-09-14 DIAGNOSIS — J45.20 MILD INTERMITTENT ASTHMA WITHOUT COMPLICATION: ICD-10-CM

## 2023-09-14 DIAGNOSIS — Z00.00 MEDICARE ANNUAL WELLNESS VISIT, SUBSEQUENT: Primary | ICD-10-CM

## 2023-09-14 DIAGNOSIS — E03.9 ACQUIRED HYPOTHYROIDISM: ICD-10-CM

## 2023-09-14 DIAGNOSIS — Z78.0 MENOPAUSE: ICD-10-CM

## 2023-09-14 DIAGNOSIS — F32.4 MAJOR DEPRESSIVE DISORDER IN PARTIAL REMISSION, UNSPECIFIED WHETHER RECURRENT (HCC): ICD-10-CM

## 2023-09-14 DIAGNOSIS — E78.5 ELEVATED LIPIDS: ICD-10-CM

## 2023-09-14 DIAGNOSIS — Z13.9 SCREENING DUE: ICD-10-CM

## 2023-09-14 DIAGNOSIS — R73.01 IFG (IMPAIRED FASTING GLUCOSE): ICD-10-CM

## 2023-09-14 ASSESSMENT — PATIENT HEALTH QUESTIONNAIRE - PHQ9
8. MOVING OR SPEAKING SO SLOWLY THAT OTHER PEOPLE COULD HAVE NOTICED. OR THE OPPOSITE, BEING SO FIGETY OR RESTLESS THAT YOU HAVE BEEN MOVING AROUND A LOT MORE THAN USUAL: 0
3. TROUBLE FALLING OR STAYING ASLEEP: 0
10. IF YOU CHECKED OFF ANY PROBLEMS, HOW DIFFICULT HAVE THESE PROBLEMS MADE IT FOR YOU TO DO YOUR WORK, TAKE CARE OF THINGS AT HOME, OR GET ALONG WITH OTHER PEOPLE: 0
5. POOR APPETITE OR OVEREATING: 0
6. FEELING BAD ABOUT YOURSELF - OR THAT YOU ARE A FAILURE OR HAVE LET YOURSELF OR YOUR FAMILY DOWN: 0
1. LITTLE INTEREST OR PLEASURE IN DOING THINGS: 0
2. FEELING DOWN, DEPRESSED OR HOPELESS: 0
4. FEELING TIRED OR HAVING LITTLE ENERGY: 0
7. TROUBLE CONCENTRATING ON THINGS, SUCH AS READING THE NEWSPAPER OR WATCHING TELEVISION: 0
SUM OF ALL RESPONSES TO PHQ QUESTIONS 1-9: 0
9. THOUGHTS THAT YOU WOULD BE BETTER OFF DEAD, OR OF HURTING YOURSELF: 0
SUM OF ALL RESPONSES TO PHQ9 QUESTIONS 1 & 2: 0
SUM OF ALL RESPONSES TO PHQ QUESTIONS 1-9: 0
2. FEELING DOWN, DEPRESSED OR HOPELESS: 0
1. LITTLE INTEREST OR PLEASURE IN DOING THINGS: 0
SUM OF ALL RESPONSES TO PHQ QUESTIONS 1-9: 0
SUM OF ALL RESPONSES TO PHQ QUESTIONS 1-9: 0
SUM OF ALL RESPONSES TO PHQ9 QUESTIONS 1 & 2: 0

## 2023-09-14 ASSESSMENT — ENCOUNTER SYMPTOMS
DIARRHEA: 0
WHEEZING: 0
VOMITING: 0
EYE DISCHARGE: 0
COUGH: 0
NAUSEA: 0
ABDOMINAL PAIN: 0
CONSTIPATION: 0
BLOOD IN STOOL: 0
SHORTNESS OF BREATH: 0

## 2023-09-14 ASSESSMENT — LIFESTYLE VARIABLES
HOW MANY STANDARD DRINKS CONTAINING ALCOHOL DO YOU HAVE ON A TYPICAL DAY: 1 OR 2
HOW OFTEN DO YOU HAVE A DRINK CONTAINING ALCOHOL: 2-4 TIMES A MONTH

## 2023-09-14 NOTE — PROGRESS NOTES
09/14/23    [facility]  78 y.o.  female      Chief Complaint   Patient presents with    6 Month Follow-Up    Medicare AWV         HPI:   Pt presents for a 6 month eval of HLD, Hypothyroid, asthma    HLD:lipids wnl    HYPOTHYROID: synthroid 75 mcg daily    ASTHMA:  Recent pulmonary visit with restrictive lung defect evaluated. DEPRESSION: currently on lexapro 5mg daily    Lab Results   Component Value Date    CHOL 180 09/05/2023    CHOL 183 03/09/2023    CHOL 186 09/09/2022     Lab Results   Component Value Date    TRIG 62 09/05/2023    TRIG 62 03/09/2023    TRIG 71 09/09/2022     Lab Results   Component Value Date    HDL 94 09/05/2023    HDL 84 (A) 03/09/2023    HDL 81 09/09/2022     Lab Results   Component Value Date    LDLCALC 74 09/05/2023    LDLCALC 87 03/09/2023    LDLCALC 92 09/09/2022     Lab Results   Component Value Date    LABVLDL 12 09/05/2023    LABVLDL 13 09/09/2022    LABVLDL 10 03/04/2022    VLDL 12 03/09/2023    VLDL 11 12/01/2014     Lab Results   Component Value Date    CHOLHDLRATIO 0 03/09/2023        Lab Results   Component Value Date     09/05/2023    K 4.7 09/05/2023    CL 99 09/05/2023    CO2 24 09/05/2023    BUN 15 09/05/2023    CREATININE 0.83 09/05/2023    GLUCOSE 97 09/05/2023    CALCIUM 9.7 09/05/2023    PROT 7.0 09/05/2023    LABALBU 4.5 09/05/2023    BILITOT 0.4 09/05/2023    ALKPHOS 99 09/05/2023    AST 15 09/05/2023    ALT 11 09/05/2023    LABGLOM 75 03/09/2023    GFRAA >60 03/04/2022    AGRATIO 1.8 09/05/2023    GLOB 2.5 09/05/2023       Lab Results   Component Value Date    LABA1C 6.0 06/30/2021     Lab Results   Component Value Date    .5 06/30/2021        Review of Systems   Constitutional:  Negative for fever. HENT:  Negative for congestion. Eyes:  Negative for discharge. Respiratory:  Negative for cough, shortness of breath and wheezing. Cardiovascular:  Negative for chest pain, palpitations and leg swelling.    Gastrointestinal:  Negative for
abdominal pain, blood in stool, constipation, diarrhea, nausea and vomiting. Genitourinary:  Negative for dysuria and hematuria. Musculoskeletal:  Negative for myalgias. Skin:  Negative for rash. Neurological:  Negative for dizziness. Psychiatric/Behavioral:  The patient is not nervous/anxious. Physical Exam  Constitutional:       General: She is not in acute distress. Appearance: She is not diaphoretic. HENT:      Right Ear: External ear normal.      Left Ear: External ear normal.      Mouth/Throat:      Pharynx: No oropharyngeal exudate. Eyes:      General: No scleral icterus. Right eye: No discharge. Left eye: No discharge. Neck:      Thyroid: No thyromegaly. Cardiovascular:      Rate and Rhythm: Normal rate and regular rhythm. Heart sounds: Normal heart sounds. No murmur heard. No friction rub. No gallop. Pulmonary:      Effort: Pulmonary effort is normal.      Breath sounds: Normal breath sounds. No wheezing. Abdominal:      General: Bowel sounds are normal. There is no distension. Palpations: Abdomen is soft. Tenderness: There is no abdominal tenderness. Musculoskeletal:         General: No tenderness. Normal range of motion. Cervical back: Normal range of motion. Lymphadenopathy:      Cervical: No cervical adenopathy. Skin:     General: Skin is warm and dry. Findings: No erythema or rash. Neurological:      Mental Status: She is alert and oriented to person, place, and time. Psychiatric:         Judgment: Judgment normal.           1. Elevated lipids  Reviewed lab results with patient  Continue lipitor 10 mg daily  - Lipid Panel; Future    2. Acquired hypothyroidism  Reviewed lab results with patient  Continue synthroid 75 mcg daily  - TSH; Future  - T4, Free; Future    3. Mild intermittent asthma without complication  Continue to FU with pulm    4.  Major depressive disorder in partial remission, unspecified whether

## 2023-10-12 ENCOUNTER — OFFICE VISIT (OUTPATIENT)
Dept: INTERNAL MEDICINE CLINIC | Age: 78
End: 2023-10-12

## 2023-10-12 VITALS
TEMPERATURE: 97.1 F | SYSTOLIC BLOOD PRESSURE: 118 MMHG | WEIGHT: 149 LBS | DIASTOLIC BLOOD PRESSURE: 60 MMHG | BODY MASS INDEX: 22.99 KG/M2

## 2023-10-12 DIAGNOSIS — L24.89 IRRITANT CONTACT DERMATITIS DUE TO OTHER AGENTS: Primary | ICD-10-CM

## 2023-10-12 RX ORDER — TRIAMCINOLONE ACETONIDE 1 MG/G
OINTMENT TOPICAL 2 TIMES DAILY
Qty: 1 EACH | Refills: 0 | Status: SHIPPED | OUTPATIENT
Start: 2023-10-12 | End: 2023-10-19

## 2023-10-12 RX ORDER — PREDNISONE 20 MG/1
20 TABLET ORAL 2 TIMES DAILY
Qty: 10 TABLET | Refills: 0 | Status: SHIPPED | OUTPATIENT
Start: 2023-10-12 | End: 2023-10-17

## 2023-10-12 ASSESSMENT — ENCOUNTER SYMPTOMS
EYE DISCHARGE: 0
SHORTNESS OF BREATH: 0
WHEEZING: 0
COUGH: 0
DIARRHEA: 0
ABDOMINAL PAIN: 0
NAUSEA: 0
VOMITING: 0
BLOOD IN STOOL: 0
CONSTIPATION: 0

## 2023-10-12 NOTE — PROGRESS NOTES
10/12/23    Ute Maloney  78 y.o.  female      Chief Complaint   Patient presents with    Rash         HPI:   Pt presents with complaint of right forearm itching and red since yesterday. Her dog had run into the woods and got trapped. She cut him loose then picked him up. Afterward the sx started. She had some generic antiitch cream that she used which helped temporarily but was short lived. Review of Systems   Constitutional:  Negative for fever. HENT:  Negative for congestion. Eyes:  Negative for discharge. Respiratory:  Negative for cough, shortness of breath and wheezing. Cardiovascular:  Negative for chest pain, palpitations and leg swelling. Gastrointestinal:  Negative for abdominal pain, blood in stool, constipation, diarrhea, nausea and vomiting. Genitourinary:  Negative for dysuria and hematuria. Musculoskeletal:  Negative for myalgias. Skin:  Positive for rash. Neurological:  Negative for dizziness. Psychiatric/Behavioral:  The patient is not nervous/anxious. Physical Exam  Constitutional:       General: She is not in acute distress. Appearance: She is not diaphoretic. HENT:      Right Ear: External ear normal.      Left Ear: External ear normal.      Mouth/Throat:      Pharynx: No oropharyngeal exudate. Eyes:      General: No scleral icterus. Right eye: No discharge. Left eye: No discharge. Neck:      Thyroid: No thyromegaly. Cardiovascular:      Rate and Rhythm: Normal rate and regular rhythm. Heart sounds: Normal heart sounds. No murmur heard. No friction rub. No gallop. Pulmonary:      Effort: Pulmonary effort is normal.      Breath sounds: Normal breath sounds. No wheezing. Abdominal:      General: Bowel sounds are normal. There is no distension. Palpations: Abdomen is soft. Tenderness: There is no abdominal tenderness. Musculoskeletal:         General: No tenderness. Normal range of motion.       Cervical back:

## 2023-11-26 NOTE — TELEPHONE ENCOUNTER
Please call patient. 1.  Order for laboratory studies have been placed in epic and can be obtained today. [facility] hospital lobby is open until 6:00 PM.  Does not have to be fasting.   2.  Offer the patient 8 AM appointment tomorrow to see me  Dr. Verónica Bray independent/needs device

## 2023-11-28 DIAGNOSIS — E78.5 ELEVATED LIPIDS: ICD-10-CM

## 2023-11-28 RX ORDER — ATORVASTATIN CALCIUM 10 MG/1
TABLET, FILM COATED ORAL
Qty: 90 TABLET | Refills: 3 | Status: SHIPPED | OUTPATIENT
Start: 2023-11-28

## 2023-11-28 NOTE — TELEPHONE ENCOUNTER
Refill request for SIMVASTATIN medication.      Name of [id number]      Last visit - 10/12/23     Pending visit - 1/10/24    Last refill -1/1/23      Medication Contract signed -   Last Oarrs ran-         Additional Comments

## 2023-12-15 DIAGNOSIS — E78.5 ELEVATED LIPIDS: ICD-10-CM

## 2023-12-15 RX ORDER — ATORVASTATIN CALCIUM 10 MG/1
TABLET, FILM COATED ORAL
Qty: 90 TABLET | Refills: 3 | Status: SHIPPED | OUTPATIENT
Start: 2023-12-15

## 2024-01-10 ENCOUNTER — HOSPITAL ENCOUNTER (OUTPATIENT)
Dept: WOMENS IMAGING | Age: 79
Discharge: HOME | End: 2024-01-10
Payer: MEDICARE

## 2024-01-10 DIAGNOSIS — Z78.0 MENOPAUSE: ICD-10-CM

## 2024-01-10 PROCEDURE — 77080 DXA BONE DENSITY AXIAL: CPT

## 2024-01-10 NOTE — RESULT ENCOUNTER NOTE
Please let her know that DEXA is good-thinning bones, as expected.  If she wants to have further discussion she is more than welcome to schedule an appt for one on one review of results and discussion of bone health  promotion.

## 2024-02-13 RX ORDER — ESCITALOPRAM OXALATE 5 MG/1
5 TABLET ORAL DAILY
Qty: 90 TABLET | Refills: 3 | Status: SHIPPED | OUTPATIENT
Start: 2024-02-13

## 2024-02-13 NOTE — TELEPHONE ENCOUNTER
Refill request for lexapro medication.     Name of [facility]acy- [facility]      Last visit - 10/12/23     Pending visit - 3/14/24    Last refill -11/30/23      Medication Contract signed -   Last Oarrs ran-         Additional Comments

## 2024-03-07 LAB
CHOLESTEROL, TOTAL: 196 MG/DL
CHOLESTEROL/HDL RATIO: 0
HDLC SERPL-MCNC: 95 MG/DL (ref 35–70)
LDL CHOLESTEROL CALCULATED: 92 MG/DL (ref 0–160)
NONHDLC SERPL-MCNC: ABNORMAL MG/DL
TRIGL SERPL-MCNC: 46 MG/DL
VLDLC SERPL CALC-MCNC: 9 MG/DL

## 2024-03-08 LAB
ALBUMIN SERPL-MCNC: 4.5 G/DL
ALP BLD-CCNC: 90 U/L
ALT SERPL-CCNC: 11 U/L
ANION GAP SERPL CALCULATED.3IONS-SCNC: 0 MMOL/L
AST SERPL-CCNC: 15 U/L
BASOPHILS ABSOLUTE: 0.1 /ΜL
BASOPHILS RELATIVE PERCENT: 2 %
BILIRUB SERPL-MCNC: 0.4 MG/DL (ref 0.1–1.4)
BUN BLDV-MCNC: 17 MG/DL
CALCIUM SERPL-MCNC: 9.9 MG/DL
CHLORIDE BLD-SCNC: 100 MMOL/L
CO2: 25 MMOL/L
CREAT SERPL-MCNC: 0.88 MG/DL
EGFR: 0
EOSINOPHILS ABSOLUTE: 0.1 /ΜL
EOSINOPHILS RELATIVE PERCENT: 2 %
GLUCOSE BLD-MCNC: 89 MG/DL
HCT VFR BLD CALC: 35.4 % (ref 36–46)
HEMOGLOBIN: 11.8 G/DL (ref 12–16)
LYMPHOCYTES ABSOLUTE: 1.6 /ΜL
LYMPHOCYTES RELATIVE PERCENT: 35 %
MCH RBC QN AUTO: 28.8 PG
MCHC RBC AUTO-ENTMCNC: 33.3 G/DL
MCV RBC AUTO: 86 FL
MONOCYTES ABSOLUTE: 0.3 /ΜL
MONOCYTES RELATIVE PERCENT: 7 %
NEUTROPHILS ABSOLUTE: 2.5 /ΜL
NEUTROPHILS RELATIVE PERCENT: 54 %
PLATELET # BLD: 273 K/ΜL
PMV BLD AUTO: 0 FL
POTASSIUM SERPL-SCNC: 4.4 MMOL/L
RBC # BLD: 4.1 10^6/ΜL
SODIUM BLD-SCNC: 138 MMOL/L
T4 FREE: 1.2
TOTAL PROTEIN: 6.8
TSH SERPL DL<=0.05 MIU/L-ACNC: 1.39 UIU/ML
WBC # BLD: 4.6 10^3/ML

## 2024-03-13 NOTE — PROGRESS NOTES
03/14/24    Sondra Radford  78 y.o.  female      Chief Complaint   Patient presents with    6 Month Follow-Up         HPI:   Pt presents for 6 month eval of  HLD,Hypothyridism, anxiety, asthma    HLD: lipitor 10  mg daily  Total C 196; HDL 96; LDL 92; Trigs 46    HYPOTHYROISM:  synthroid 75 mcg daily  TSH 1.39; T4 1.2    ANXIETY: Lexapro 5 mg daily    ASTHMA:  Albuterol HHA    ANEMIA: H/H =11.8/35.4    BP: 128/74    Lab Results   Component Value Date    CHOL 196 03/07/2024    CHOL 180 09/05/2023    CHOL 183 03/09/2023     Lab Results   Component Value Date    TRIG 46 03/07/2024    TRIG 62 09/05/2023    TRIG 62 03/09/2023     Lab Results   Component Value Date    HDL 95 (A) 03/07/2024    HDL 94 09/05/2023    HDL 84 (A) 03/09/2023     Lab Results   Component Value Date    LDLCALC 92 03/07/2024    LDLCALC 74 09/05/2023    LDLCALC 87 03/09/2023     Lab Results   Component Value Date    VLDL 9 03/07/2024    VLDL 12 03/09/2023    VLDL 11 12/01/2014     Lab Results   Component Value Date    CHOLHDLRATIO 0 03/07/2024    CHOLHDLRATIO 0 03/09/2023        Lab Results   Component Value Date     03/07/2024    K 4.4 03/07/2024     03/07/2024    CO2 25 03/07/2024    BUN 17 03/07/2024    CREATININE 0.88 03/07/2024    GLUCOSE 89 03/07/2024    CALCIUM 9.9 03/07/2024    PROT 7.0 09/05/2023    LABALBU 4.5 03/07/2024    BILITOT 0.4 03/07/2024    ALKPHOS 90 03/07/2024    AST 15 03/07/2024    ALT 11 03/07/2024    LABGLOM 0.0 03/07/2024    GFRAA >60 03/04/2022    AGRATIO 1.8 09/05/2023    GLOB 2.5 09/05/2023       Lab Results   Component Value Date    LABA1C 6.0 06/30/2021     Lab Results   Component Value Date    .5 06/30/2021        Review of Systems   Constitutional:  Negative for fever.   HENT:  Negative for congestion.    Eyes:  Negative for discharge.   Respiratory:  Negative for cough, shortness of breath and wheezing.    Cardiovascular:  Negative for chest pain, palpitations and leg swelling.

## 2024-03-14 ENCOUNTER — OFFICE VISIT (OUTPATIENT)
Dept: INTERNAL MEDICINE CLINIC | Age: 79
End: 2024-03-14

## 2024-03-14 VITALS
DIASTOLIC BLOOD PRESSURE: 74 MMHG | BODY MASS INDEX: 22.99 KG/M2 | WEIGHT: 149 LBS | TEMPERATURE: 97 F | SYSTOLIC BLOOD PRESSURE: 128 MMHG

## 2024-03-14 DIAGNOSIS — D50.8 OTHER IRON DEFICIENCY ANEMIA: ICD-10-CM

## 2024-03-14 DIAGNOSIS — J45.20 MILD INTERMITTENT ASTHMA WITHOUT COMPLICATION: ICD-10-CM

## 2024-03-14 DIAGNOSIS — F41.9 ANXIETY: ICD-10-CM

## 2024-03-14 DIAGNOSIS — R73.01 IFG (IMPAIRED FASTING GLUCOSE): ICD-10-CM

## 2024-03-14 DIAGNOSIS — E03.9 ACQUIRED HYPOTHYROIDISM: ICD-10-CM

## 2024-03-14 DIAGNOSIS — E78.5 ELEVATED LIPIDS: Primary | ICD-10-CM

## 2024-03-14 SDOH — ECONOMIC STABILITY: INCOME INSECURITY: HOW HARD IS IT FOR YOU TO PAY FOR THE VERY BASICS LIKE FOOD, HOUSING, MEDICAL CARE, AND HEATING?: NOT HARD AT ALL

## 2024-03-14 SDOH — ECONOMIC STABILITY: FOOD INSECURITY: WITHIN THE PAST 12 MONTHS, YOU WORRIED THAT YOUR FOOD WOULD RUN OUT BEFORE YOU GOT MONEY TO BUY MORE.: NEVER TRUE

## 2024-03-14 SDOH — ECONOMIC STABILITY: FOOD INSECURITY: WITHIN THE PAST 12 MONTHS, THE FOOD YOU BOUGHT JUST DIDN'T LAST AND YOU DIDN'T HAVE MONEY TO GET MORE.: NEVER TRUE

## 2024-03-14 ASSESSMENT — ENCOUNTER SYMPTOMS
DIARRHEA: 0
CONSTIPATION: 0
WHEEZING: 0
ABDOMINAL PAIN: 0
NAUSEA: 0
BLOOD IN STOOL: 0
VOMITING: 0
COUGH: 0
SHORTNESS OF BREATH: 0
EYE DISCHARGE: 0

## 2024-03-14 ASSESSMENT — PATIENT HEALTH QUESTIONNAIRE - PHQ9
4. FEELING TIRED OR HAVING LITTLE ENERGY: 0
SUM OF ALL RESPONSES TO PHQ QUESTIONS 1-9: 0
2. FEELING DOWN, DEPRESSED OR HOPELESS: 0
SUM OF ALL RESPONSES TO PHQ QUESTIONS 1-9: 0
SUM OF ALL RESPONSES TO PHQ9 QUESTIONS 1 & 2: 0
SUM OF ALL RESPONSES TO PHQ QUESTIONS 1-9: 0
8. MOVING OR SPEAKING SO SLOWLY THAT OTHER PEOPLE COULD HAVE NOTICED. OR THE OPPOSITE, BEING SO FIGETY OR RESTLESS THAT YOU HAVE BEEN MOVING AROUND A LOT MORE THAN USUAL: 0
SUM OF ALL RESPONSES TO PHQ QUESTIONS 1-9: 0
5. POOR APPETITE OR OVEREATING: 0
3. TROUBLE FALLING OR STAYING ASLEEP: 0
7. TROUBLE CONCENTRATING ON THINGS, SUCH AS READING THE NEWSPAPER OR WATCHING TELEVISION: 0
9. THOUGHTS THAT YOU WOULD BE BETTER OFF DEAD, OR OF HURTING YOURSELF: 0
10. IF YOU CHECKED OFF ANY PROBLEMS, HOW DIFFICULT HAVE THESE PROBLEMS MADE IT FOR YOU TO DO YOUR WORK, TAKE CARE OF THINGS AT HOME, OR GET ALONG WITH OTHER PEOPLE: 0
1. LITTLE INTEREST OR PLEASURE IN DOING THINGS: 0
6. FEELING BAD ABOUT YOURSELF - OR THAT YOU ARE A FAILURE OR HAVE LET YOURSELF OR YOUR FAMILY DOWN: 0

## 2024-04-29 DIAGNOSIS — E03.9 ACQUIRED HYPOTHYROIDISM: ICD-10-CM

## 2024-04-30 RX ORDER — LEVOTHYROXINE SODIUM 0.07 MG/1
TABLET ORAL
Qty: 90 TABLET | Refills: 3 | Status: SHIPPED | OUTPATIENT
Start: 2024-04-30

## 2024-04-30 NOTE — TELEPHONE ENCOUNTER
Refill request for Levothyroxine Sodium 75 mcg  medication.     Name of [facility]acy- [facility]      Last visit - 3/14/24     Pending visit - 9/17/24    Last refill -1/1/23      Medication Contract signed -   Last Oarrs ran-         Additional Comments

## 2024-05-03 ENCOUNTER — OFFICE VISIT (OUTPATIENT)
Dept: INTERNAL MEDICINE CLINIC | Age: 79
End: 2024-05-03

## 2024-05-03 VITALS
TEMPERATURE: 97 F | SYSTOLIC BLOOD PRESSURE: 104 MMHG | WEIGHT: 150 LBS | BODY MASS INDEX: 23.15 KG/M2 | DIASTOLIC BLOOD PRESSURE: 58 MMHG

## 2024-05-03 DIAGNOSIS — H65.02 NON-RECURRENT ACUTE SEROUS OTITIS MEDIA OF LEFT EAR: Primary | ICD-10-CM

## 2024-05-03 RX ORDER — AMOXICILLIN 500 MG/1
500 CAPSULE ORAL 3 TIMES DAILY
Qty: 21 CAPSULE | Refills: 0 | Status: SHIPPED | OUTPATIENT
Start: 2024-05-03 | End: 2024-05-10

## 2024-05-03 ASSESSMENT — ENCOUNTER SYMPTOMS
EYE DISCHARGE: 0
COUGH: 0
DIARRHEA: 0
WHEEZING: 0
BLOOD IN STOOL: 0
CONSTIPATION: 0
VOMITING: 0
NAUSEA: 0
ABDOMINAL PAIN: 0
SHORTNESS OF BREATH: 0

## 2024-05-03 NOTE — PROGRESS NOTES
05/03/24    Sondra Radford  78 y.o.  female      Chief Complaint   Patient presents with    Otalgia         HPI:   Pt presents with complaint of left ear pain for greater than a week extending down left side of neck, described as a stabbing pain. She has allergies and thought initially that was the problem.  She had been on zyrtec so switched to claritin but thought it wasn't as effective so went back to zyrtec.  She also just recently got new hearing aids and it seemed her left ear was irritated and hearing was decreased even with the new aids.  No other sx. She is scheduled to go out of town on a family vacation.  She has a hx of pneumonia that was extremely hard to clear so she wanted to be proactive.    BP:  104/58    Review of Systems   Constitutional:  Negative for fever.   HENT:  Negative for congestion.    Eyes:  Negative for discharge.   Respiratory:  Negative for cough, shortness of breath and wheezing.    Cardiovascular:  Negative for chest pain, palpitations and leg swelling.   Gastrointestinal:  Negative for abdominal pain, blood in stool, constipation, diarrhea, nausea and vomiting.   Genitourinary:  Negative for dysuria and hematuria.   Musculoskeletal:  Negative for myalgias.   Skin:  Negative for rash.   Neurological:  Negative for dizziness.   Psychiatric/Behavioral:  The patient is not nervous/anxious.        Physical Exam  Constitutional:       General: She is not in acute distress.     Appearance: She is not diaphoretic.   HENT:      Right Ear: Tympanic membrane and external ear normal.      Left Ear: External ear normal.      Ears:      Comments: L TM with effusion     Mouth/Throat:      Pharynx: No oropharyngeal exudate.   Eyes:      General: No scleral icterus.        Right eye: No discharge.         Left eye: No discharge.   Neck:      Thyroid: No thyromegaly.   Cardiovascular:      Rate and Rhythm: Normal rate and regular rhythm.      Heart sounds: Normal heart sounds. No murmur heard.

## 2024-05-07 ENCOUNTER — OFFICE VISIT (OUTPATIENT)
Dept: PULMONOLOGY | Age: 79
End: 2024-05-07

## 2024-05-07 VITALS
BODY MASS INDEX: 22.85 KG/M2 | HEIGHT: 68 IN | TEMPERATURE: 97.6 F | DIASTOLIC BLOOD PRESSURE: 63 MMHG | OXYGEN SATURATION: 99 % | HEART RATE: 69 BPM | RESPIRATION RATE: 16 BRPM | WEIGHT: 150.8 LBS | SYSTOLIC BLOOD PRESSURE: 102 MMHG

## 2024-05-07 DIAGNOSIS — G47.33 OSA (OBSTRUCTIVE SLEEP APNEA): ICD-10-CM

## 2024-05-07 DIAGNOSIS — J45.20 MILD INTERMITTENT ASTHMA WITHOUT COMPLICATION: Primary | ICD-10-CM

## 2024-05-07 PROBLEM — F32.4 MAJOR DEPRESSIVE DISORDER IN PARTIAL REMISSION, UNSPECIFIED WHETHER RECURRENT (HCC): Status: RESOLVED | Noted: 2022-03-10 | Resolved: 2024-05-07

## 2024-05-07 RX ORDER — ALBUTEROL SULFATE 90 UG/1
2 AEROSOL, METERED RESPIRATORY (INHALATION) EVERY 6 HOURS PRN
Qty: 18 G | Refills: 1 | Status: SHIPPED | OUTPATIENT
Start: 2024-05-07

## 2024-05-07 RX ORDER — CETIRIZINE HYDROCHLORIDE 5 MG/1
5 TABLET ORAL DAILY
COMMUNITY

## 2024-05-07 ASSESSMENT — ENCOUNTER SYMPTOMS
EYE PAIN: 0
VOMITING: 0
CHEST TIGHTNESS: 0
DIARRHEA: 0
RHINORRHEA: 0
NAUSEA: 0
WHEEZING: 0
ABDOMINAL PAIN: 0
SHORTNESS OF BREATH: 0
SORE THROAT: 0
COUGH: 0

## 2024-05-07 NOTE — PATIENT INSTRUCTIONS
Call with worsening symptoms such as increased shortness of breath, productive cough, wheezing or symptoms not responding to treatment plan.     Your current pulmonary medications are controlling/treating your Asthma.  Stay compliant.  Reviewed present pulmonary medications and side effects.  Reviewed proper inhaler usage.       Advised to avoid driving when too sleepy to function safely. Discussed the risks of untreated apnea such as accidents, cognitive impairment, mood impairment, high blood pressure, various cardiac diseases and stroke.       Return to clinic in 1 year or sooner with issues.  Remember to bring a list of pulmonary medications and any CPAP or BiPAP machines to your next appointment with the office.     Please keep all of your future appointments scheduled by [facility] office. Out of respect for other patients and providers, you may be asked to reschedule your appointment if you arrive later than your scheduled appointment time. Appointments cancelled less than 24hrs in advance will be considered a no show. Patients with three missed appointments within 1 year or four missed appointments within 2 years can be dismissed from the practice.     Please be aware that our physicians are required to work in the Intensive Care Unit at [facility].  Your appointment may need to be rescheduled if they are designated to work during your appointment time.      You may receive a survey regarding the care you received during your visit.  Your input is valuable to us.  We encourage you to complete and return your survey.  We hope you will choose us in the future for your healthcare needs.     Pt instructed of all future appointment dates & times, including radiology, labs, procedures & referrals. If procedures were scheduled preparation instructions provided. Instructions on future appointments with [facility] were given.      In the next few weeks, you

## 2024-05-07 NOTE — PROGRESS NOTES
Sondra Radford is a 78 y.o. who comes in today for Asthma (Patient is here for asthma follow up. )   She continues to use albuterol sporadically.   Patient reports zero days in last seven days with asthma symptoms during the days and uses albuterol less than twice monthly on average.  Patient reports zero nights in last seven nights with nighttime symptoms of asthma.    States her breathing is doing well.  She is currently on antibiotics for an ear infection with improvement.  She continues zyrtec.  She did smoke for a couple of years in the distant past.        She was diagnosed with mild KIANNA in 2021, AHI 5.0, using positional therapy.  Denies any sleep issues or EDS.           Past Medical History:   Diagnosis Date    Anemia     Anxiety     Arthritis     Asthma '96    PFT's    Back strain     Bell's palsy 04/2018    Rt Side    Chest pain 08/05/2016    GXT: Normal.    Chronic back pain     Cystitis     Depression     Dermatitis     Fibroids     GERD (gastroesophageal reflux disease)     Hematuria '00    Cystoscopy & IVP  (---)    History of recurrent UTIs     Hyperlipidemia     Hypothyroidism 06/2005    Insomnia     Irritable bowel syndrome     Major depressive disorder in partial remission, unspecified whether recurrent (HCC) 03/10/2022    Palpitations     Pneumonia 11/1993    Sleep apnea     mild-uses a wedge at night        Past Surgical History:   Procedure Laterality Date    CHOLECYSTECTOMY      COLONOSCOPY  01/04    Neg.    COLONOSCOPY  10/1/14    Tubular Adenoma    COLONOSCOPY  11/07/2017     Hyperplastic Polyps ×2: Redo 5 years    COLONOSCOPY N/A 5/24/2022    COLONOSCOPY POLYPECTOMY SNARE/COLD BIOPSY performed by Jan Amin MD at [facility] ENDOSCOPY    CYSTOSCOPY      HYSTERECTOMY, TOTAL ABDOMINAL (CERVIX REMOVED)      TONSILLECTOMY AND ADENOIDECTOMY      TUBAL LIGATION      UPPER GASTROINTESTINAL ENDOSCOPY N/A 2/18/2022    EGD BIOPSY performed by Sandeep Melendez DO at [facility] ENDOSCOPY

## 2024-09-11 LAB
ALBUMIN: 4.4 G/DL
ALP BLD-CCNC: 112 U/L
ALT SERPL-CCNC: 15 U/L
ANION GAP SERPL CALCULATED.3IONS-SCNC: 0 MMOL/L
AST SERPL-CCNC: 22 U/L
BILIRUB SERPL-MCNC: 0.3 MG/DL (ref 0.1–1.4)
BUN BLDV-MCNC: 20 MG/DL
CALCIUM SERPL-MCNC: 9.6 MG/DL
CHLORIDE BLD-SCNC: 98 MMOL/L
CHOLESTEROL, TOTAL: 175 MG/DL
CHOLESTEROL/HDL RATIO: 0
CO2: 23 MMOL/L
CREAT SERPL-MCNC: 0.89 MG/DL
ESTIMATED AVERAGE GLUCOSE: NORMAL
GFR, ESTIMATED: 0
GLUCOSE BLD-MCNC: 95 MG/DL
HBA1C MFR BLD: 6 %
HDLC SERPL-MCNC: 88 MG/DL (ref 35–70)
LDL CHOLESTEROL: 76
NONHDLC SERPL-MCNC: 0 MG/DL
POTASSIUM SERPL-SCNC: 4.6 MMOL/L
SODIUM BLD-SCNC: 135 MMOL/L
TOTAL PROTEIN: 6.8 G/DL (ref 6.4–8.2)
TRIGL SERPL-MCNC: 57 MG/DL
TSH SERPL DL<=0.05 MIU/L-ACNC: 2.24 UIU/ML
VLDLC SERPL CALC-MCNC: 11 MG/DL

## 2024-09-17 ENCOUNTER — OFFICE VISIT (OUTPATIENT)
Dept: INTERNAL MEDICINE CLINIC | Age: 79
End: 2024-09-17

## 2024-09-17 VITALS
SYSTOLIC BLOOD PRESSURE: 130 MMHG | WEIGHT: 154 LBS | BODY MASS INDEX: 23.34 KG/M2 | TEMPERATURE: 97 F | DIASTOLIC BLOOD PRESSURE: 70 MMHG | HEIGHT: 68 IN

## 2024-09-17 DIAGNOSIS — R73.01 IFG (IMPAIRED FASTING GLUCOSE): ICD-10-CM

## 2024-09-17 DIAGNOSIS — D50.8 OTHER IRON DEFICIENCY ANEMIA: ICD-10-CM

## 2024-09-17 DIAGNOSIS — Z00.00 MEDICARE ANNUAL WELLNESS VISIT, SUBSEQUENT: Primary | ICD-10-CM

## 2024-09-17 DIAGNOSIS — E03.9 ACQUIRED HYPOTHYROIDISM: ICD-10-CM

## 2024-09-17 DIAGNOSIS — E78.5 ELEVATED LIPIDS: ICD-10-CM

## 2024-09-17 ASSESSMENT — PATIENT HEALTH QUESTIONNAIRE - PHQ9
SUM OF ALL RESPONSES TO PHQ QUESTIONS 1-9: 0
5. POOR APPETITE OR OVEREATING: NOT AT ALL
9. THOUGHTS THAT YOU WOULD BE BETTER OFF DEAD, OR OF HURTING YOURSELF: NOT AT ALL
SUM OF ALL RESPONSES TO PHQ QUESTIONS 1-9: 0
4. FEELING TIRED OR HAVING LITTLE ENERGY: NOT AT ALL
SUM OF ALL RESPONSES TO PHQ QUESTIONS 1-9: 0
3. TROUBLE FALLING OR STAYING ASLEEP: NOT AT ALL
7. TROUBLE CONCENTRATING ON THINGS, SUCH AS READING THE NEWSPAPER OR WATCHING TELEVISION: NOT AT ALL
8. MOVING OR SPEAKING SO SLOWLY THAT OTHER PEOPLE COULD HAVE NOTICED. OR THE OPPOSITE, BEING SO FIGETY OR RESTLESS THAT YOU HAVE BEEN MOVING AROUND A LOT MORE THAN USUAL: NOT AT ALL
6. FEELING BAD ABOUT YOURSELF - OR THAT YOU ARE A FAILURE OR HAVE LET YOURSELF OR YOUR FAMILY DOWN: NOT AT ALL
2. FEELING DOWN, DEPRESSED OR HOPELESS: NOT AT ALL
SUM OF ALL RESPONSES TO PHQ QUESTIONS 1-9: 0

## 2025-01-01 DIAGNOSIS — E78.5 ELEVATED LIPIDS: ICD-10-CM

## 2025-01-02 RX ORDER — ESCITALOPRAM OXALATE 5 MG/1
5 TABLET ORAL DAILY
Qty: 90 TABLET | Refills: 3 | Status: SHIPPED | OUTPATIENT
Start: 2025-01-02

## 2025-01-02 RX ORDER — ATORVASTATIN CALCIUM 10 MG/1
TABLET, FILM COATED ORAL
Qty: 90 TABLET | Refills: 3 | Status: SHIPPED | OUTPATIENT
Start: 2025-01-02

## 2025-01-02 NOTE — TELEPHONE ENCOUNTER
Refill request for LEXAPRO, ATORVASTATIN medication.     Name of Pharmacy- [facility]      Last visit - 9/17/24     Pending visit - 3/18/25    Last refill -1018/24      Medication Contract signed -   Last Oarrs ran-         Additional Comments

## 2025-03-11 ENCOUNTER — RESULTS FOLLOW-UP (OUTPATIENT)
Dept: INTERNAL MEDICINE CLINIC | Age: 80
End: 2025-03-11

## 2025-03-11 LAB
ALBUMIN: 4.3 G/DL (ref 3.8–4.8)
ALP BLD-CCNC: 85 IU/L (ref 44–121)
ALT SERPL-CCNC: 11 IU/L (ref 0–32)
AST SERPL-CCNC: 17 IU/L (ref 0–40)
BASOPHILS ABSOLUTE: 0.1 X10E3/UL (ref 0–0.2)
BASOPHILS RELATIVE PERCENT: 1 %
BILIRUB SERPL-MCNC: 0.3 MG/DL (ref 0–1.2)
BUN / CREAT RATIO: 20 (ref 12–28)
BUN BLDV-MCNC: 20 MG/DL (ref 8–27)
CALCIUM SERPL-MCNC: 9.4 MG/DL (ref 8.7–10.3)
CHLORIDE BLD-SCNC: 102 MMOL/L (ref 96–106)
CHOLESTEROL, TOTAL: 190 MG/DL (ref 100–199)
CO2: 24 MMOL/L (ref 20–29)
CREAT SERPL-MCNC: 0.99 MG/DL (ref 0.57–1)
EOSINOPHILS ABSOLUTE: 0.1 X10E3/UL (ref 0–0.4)
EOSINOPHILS RELATIVE PERCENT: 3 %
GLOBULIN: 2.4 G/DL (ref 1.5–4.5)
GLUCOSE BLD-MCNC: 97 MG/DL (ref 70–99)
HBA1C MFR BLD: 6 % (ref 4.8–5.6)
HCT VFR BLD CALC: 35.9 % (ref 34–46.6)
HDLC SERPL-MCNC: 88 MG/DL
HEMOGLOBIN: 11.7 G/DL (ref 11.1–15.9)
IMMATURE GRANS (ABS): 0 X10E3/UL (ref 0–0.1)
IMMATURE GRANULOCYTES %: 0 %
LDL CHOLESTEROL: 91 MG/DL (ref 0–99)
LYMPHOCYTES ABSOLUTE: 1.9 X10E3/UL (ref 0.7–3.1)
LYMPHOCYTES RELATIVE PERCENT: 37 %
MCH RBC QN AUTO: 28.5 PG (ref 26.6–33)
MCHC RBC AUTO-ENTMCNC: 32.6 G/DL (ref 31.5–35.7)
MCV RBC AUTO: 88 FL (ref 79–97)
MONOCYTES ABSOLUTE: 0.4 X10E3/UL (ref 0.1–0.9)
MONOCYTES RELATIVE PERCENT: 8 %
NEUTROPHILS ABSOLUTE: 2.6 X10E3/UL (ref 1.4–7)
NEUTROPHILS RELATIVE PERCENT: 51 %
PDW BLD-RTO: 13.1 % (ref 11.7–15.4)
PLATELET # BLD: 275 X10E3/UL (ref 150–450)
POTASSIUM SERPL-SCNC: 4.7 MMOL/L (ref 3.5–5.2)
RBC # BLD: 4.1 X10E6/UL (ref 3.77–5.28)
SODIUM BLD-SCNC: 140 MMOL/L (ref 134–144)
T4 FREE: 1.23 NG/DL (ref 0.82–1.77)
TOTAL PROTEIN: 6.7 G/DL (ref 6–8.5)
TRIGL SERPL-MCNC: 56 MG/DL (ref 0–149)
TSH SERPL DL<=0.05 MIU/L-ACNC: 2.31 UIU/ML (ref 0.45–4.5)
VLDLC SERPL CALC-MCNC: 11 MG/DL (ref 5–40)
WBC # BLD: 5.1 X10E3/UL (ref 3.4–10.8)

## 2025-03-17 DIAGNOSIS — E03.9 ACQUIRED HYPOTHYROIDISM: ICD-10-CM

## 2025-03-17 RX ORDER — LEVOTHYROXINE SODIUM 75 UG/1
TABLET ORAL
Qty: 90 TABLET | Refills: 3 | Status: SHIPPED | OUTPATIENT
Start: 2025-03-17

## 2025-03-17 SDOH — ECONOMIC STABILITY: FOOD INSECURITY: WITHIN THE PAST 12 MONTHS, THE FOOD YOU BOUGHT JUST DIDN'T LAST AND YOU DIDN'T HAVE MONEY TO GET MORE.: NEVER TRUE

## 2025-03-17 SDOH — ECONOMIC STABILITY: TRANSPORTATION INSECURITY
IN THE PAST 12 MONTHS, HAS THE LACK OF TRANSPORTATION KEPT YOU FROM MEDICAL APPOINTMENTS OR FROM GETTING MEDICATIONS?: NO

## 2025-03-17 SDOH — ECONOMIC STABILITY: FOOD INSECURITY: WITHIN THE PAST 12 MONTHS, YOU WORRIED THAT YOUR FOOD WOULD RUN OUT BEFORE YOU GOT MONEY TO BUY MORE.: NEVER TRUE

## 2025-03-17 SDOH — ECONOMIC STABILITY: INCOME INSECURITY: IN THE LAST 12 MONTHS, WAS THERE A TIME WHEN YOU WERE NOT ABLE TO PAY THE MORTGAGE OR RENT ON TIME?: NO

## 2025-03-17 SDOH — ECONOMIC STABILITY: TRANSPORTATION INSECURITY
IN THE PAST 12 MONTHS, HAS LACK OF TRANSPORTATION KEPT YOU FROM MEETINGS, WORK, OR FROM GETTING THINGS NEEDED FOR DAILY LIVING?: NO

## 2025-03-17 ASSESSMENT — PATIENT HEALTH QUESTIONNAIRE - PHQ9
SUM OF ALL RESPONSES TO PHQ QUESTIONS 1-9: 3
SUM OF ALL RESPONSES TO PHQ QUESTIONS 1-9: 3
10. IF YOU CHECKED OFF ANY PROBLEMS, HOW DIFFICULT HAVE THESE PROBLEMS MADE IT FOR YOU TO DO YOUR WORK, TAKE CARE OF THINGS AT HOME, OR GET ALONG WITH OTHER PEOPLE: NOT DIFFICULT AT ALL
3. TROUBLE FALLING OR STAYING ASLEEP: SEVERAL DAYS
2. FEELING DOWN, DEPRESSED OR HOPELESS: NOT AT ALL
5. POOR APPETITE OR OVEREATING: SEVERAL DAYS
9. THOUGHTS THAT YOU WOULD BE BETTER OFF DEAD, OR OF HURTING YOURSELF: NOT AT ALL
SUM OF ALL RESPONSES TO PHQ QUESTIONS 1-9: 3
7. TROUBLE CONCENTRATING ON THINGS, SUCH AS READING THE NEWSPAPER OR WATCHING TELEVISION: NOT AT ALL
4. FEELING TIRED OR HAVING LITTLE ENERGY: SEVERAL DAYS
3. TROUBLE FALLING OR STAYING ASLEEP: SEVERAL DAYS
SUM OF ALL RESPONSES TO PHQ QUESTIONS 1-9: 3
8. MOVING OR SPEAKING SO SLOWLY THAT OTHER PEOPLE COULD HAVE NOTICED. OR THE OPPOSITE - BEING SO FIDGETY OR RESTLESS THAT YOU HAVE BEEN MOVING AROUND A LOT MORE THAN USUAL: NOT AT ALL
8. MOVING OR SPEAKING SO SLOWLY THAT OTHER PEOPLE COULD HAVE NOTICED. OR THE OPPOSITE, BEING SO FIGETY OR RESTLESS THAT YOU HAVE BEEN MOVING AROUND A LOT MORE THAN USUAL: NOT AT ALL
7. TROUBLE CONCENTRATING ON THINGS, SUCH AS READING THE NEWSPAPER OR WATCHING TELEVISION: NOT AT ALL
1. LITTLE INTEREST OR PLEASURE IN DOING THINGS: NOT AT ALL
2. FEELING DOWN, DEPRESSED OR HOPELESS: NOT AT ALL
4. FEELING TIRED OR HAVING LITTLE ENERGY: SEVERAL DAYS
9. THOUGHTS THAT YOU WOULD BE BETTER OFF DEAD, OR OF HURTING YOURSELF: NOT AT ALL
SUM OF ALL RESPONSES TO PHQ QUESTIONS 1-9: 3
6. FEELING BAD ABOUT YOURSELF - OR THAT YOU ARE A FAILURE OR HAVE LET YOURSELF OR YOUR FAMILY DOWN: NOT AT ALL
10. IF YOU CHECKED OFF ANY PROBLEMS, HOW DIFFICULT HAVE THESE PROBLEMS MADE IT FOR YOU TO DO YOUR WORK, TAKE CARE OF THINGS AT HOME, OR GET ALONG WITH OTHER PEOPLE: NOT DIFFICULT AT ALL
5. POOR APPETITE OR OVEREATING: SEVERAL DAYS
1. LITTLE INTEREST OR PLEASURE IN DOING THINGS: NOT AT ALL
6. FEELING BAD ABOUT YOURSELF - OR THAT YOU ARE A FAILURE OR HAVE LET YOURSELF OR YOUR FAMILY DOWN: NOT AT ALL

## 2025-03-17 NOTE — PROGRESS NOTES
03/18/25    Sondra Radford  79 y.o.  female      Chief Complaint   Patient presents with    6 Month Follow-Up         HPI:   Pt presents for 6 month eval and lab review:    HYPOTHYROIDISM: TSH 2.31; T4 Free 1.23  Synthroid 75 mcg daily    HLD: Total C 190; HDL 88; LDL 91; Trigs 56  Lipitor 10 mg    IFG: glucose 97   A1C 6.0    ANEMIA:  CBC wnl    ANXIETY:  lexapro 5 mg daily.  Pt stopped taking    ASTHMA: albuterol HHN prn    Wt Readings from Last 3 Encounters:   03/18/25 73.5 kg (162 lb)   09/17/24 69.9 kg (154 lb)   05/07/24 68.4 kg (150 lb 12.8 oz)            Review of Systems   Constitutional:  Negative for fever.   HENT:  Negative for congestion.    Eyes:  Negative for discharge.   Respiratory:  Negative for cough, shortness of breath and wheezing.    Cardiovascular:  Negative for chest pain, palpitations and leg swelling.   Gastrointestinal:  Negative for abdominal pain, blood in stool, constipation, diarrhea, nausea and vomiting.   Genitourinary:  Negative for dysuria and hematuria.   Musculoskeletal:  Negative for myalgias.   Skin:  Negative for rash.   Neurological:  Negative for dizziness.   Psychiatric/Behavioral:  The patient is not nervous/anxious.        Physical Exam  Constitutional:       General: She is not in acute distress.     Appearance: She is not diaphoretic.   HENT:      Right Ear: External ear normal.      Left Ear: External ear normal.      Mouth/Throat:      Pharynx: No oropharyngeal exudate.   Eyes:      General: No scleral icterus.        Right eye: No discharge.         Left eye: No discharge.   Neck:      Thyroid: No thyromegaly.   Cardiovascular:      Rate and Rhythm: Normal rate and regular rhythm.      Heart sounds: Normal heart sounds. No murmur heard.     No friction rub. No gallop.   Pulmonary:      Effort: Pulmonary effort is normal.      Breath sounds: Normal breath sounds. No wheezing.   Abdominal:      General: Bowel sounds are normal. There is no distension.

## 2025-03-17 NOTE — TELEPHONE ENCOUNTER
Refill request for Synthroid medication.     Name of Pharmacy- [facility]      Last visit - 9/17/24     Pending visit - 3/18/25    Last refill -4/30/24

## 2025-03-18 ENCOUNTER — OFFICE VISIT (OUTPATIENT)
Dept: INTERNAL MEDICINE CLINIC | Age: 80
End: 2025-03-18

## 2025-03-18 VITALS
TEMPERATURE: 97 F | BODY MASS INDEX: 25 KG/M2 | HEART RATE: 66 BPM | SYSTOLIC BLOOD PRESSURE: 132 MMHG | DIASTOLIC BLOOD PRESSURE: 70 MMHG | WEIGHT: 162 LBS | OXYGEN SATURATION: 98 %

## 2025-03-18 DIAGNOSIS — R73.01 IFG (IMPAIRED FASTING GLUCOSE): ICD-10-CM

## 2025-03-18 DIAGNOSIS — F41.9 ANXIETY: ICD-10-CM

## 2025-03-18 DIAGNOSIS — J45.20 MILD INTERMITTENT ASTHMA WITHOUT COMPLICATION: ICD-10-CM

## 2025-03-18 DIAGNOSIS — E03.9 ACQUIRED HYPOTHYROIDISM: Primary | ICD-10-CM

## 2025-03-18 DIAGNOSIS — D50.8 OTHER IRON DEFICIENCY ANEMIA: ICD-10-CM

## 2025-03-18 DIAGNOSIS — E78.5 ELEVATED LIPIDS: ICD-10-CM

## 2025-03-18 SDOH — ECONOMIC STABILITY: FOOD INSECURITY: WITHIN THE PAST 12 MONTHS, THE FOOD YOU BOUGHT JUST DIDN'T LAST AND YOU DIDN'T HAVE MONEY TO GET MORE.: NEVER TRUE

## 2025-03-18 SDOH — ECONOMIC STABILITY: FOOD INSECURITY: WITHIN THE PAST 12 MONTHS, YOU WORRIED THAT YOUR FOOD WOULD RUN OUT BEFORE YOU GOT MONEY TO BUY MORE.: NEVER TRUE

## 2025-03-18 ASSESSMENT — ENCOUNTER SYMPTOMS
BLOOD IN STOOL: 0
CONSTIPATION: 0
DIARRHEA: 0
COUGH: 0
EYE DISCHARGE: 0
SHORTNESS OF BREATH: 0
NAUSEA: 0
WHEEZING: 0
VOMITING: 0
ABDOMINAL PAIN: 0

## 2025-05-01 ENCOUNTER — OFFICE VISIT (OUTPATIENT)
Dept: PULMONOLOGY | Age: 80
End: 2025-05-01
Payer: MEDICARE

## 2025-05-01 VITALS
BODY MASS INDEX: 24.55 KG/M2 | WEIGHT: 162 LBS | SYSTOLIC BLOOD PRESSURE: 100 MMHG | RESPIRATION RATE: 16 BRPM | HEART RATE: 79 BPM | HEIGHT: 68 IN | DIASTOLIC BLOOD PRESSURE: 67 MMHG | TEMPERATURE: 97.8 F | OXYGEN SATURATION: 98 %

## 2025-05-01 DIAGNOSIS — G47.33 OSA (OBSTRUCTIVE SLEEP APNEA): ICD-10-CM

## 2025-05-01 DIAGNOSIS — J45.20 MILD INTERMITTENT ASTHMA WITHOUT COMPLICATION: Primary | ICD-10-CM

## 2025-05-01 PROCEDURE — G8399 PT W/DXA RESULTS DOCUMENT: HCPCS | Performed by: NURSE PRACTITIONER

## 2025-05-01 PROCEDURE — 99214 OFFICE O/P EST MOD 30 MIN: CPT | Performed by: NURSE PRACTITIONER

## 2025-05-01 PROCEDURE — 1123F ACP DISCUSS/DSCN MKR DOCD: CPT | Performed by: NURSE PRACTITIONER

## 2025-05-01 PROCEDURE — 1036F TOBACCO NON-USER: CPT | Performed by: NURSE PRACTITIONER

## 2025-05-01 PROCEDURE — 1090F PRES/ABSN URINE INCON ASSESS: CPT | Performed by: NURSE PRACTITIONER

## 2025-05-01 PROCEDURE — G8419 CALC BMI OUT NRM PARAM NOF/U: HCPCS | Performed by: NURSE PRACTITIONER

## 2025-05-01 PROCEDURE — G8427 DOCREV CUR MEDS BY ELIG CLIN: HCPCS | Performed by: NURSE PRACTITIONER

## 2025-05-01 PROCEDURE — 1159F MED LIST DOCD IN RCRD: CPT | Performed by: NURSE PRACTITIONER

## 2025-05-01 RX ORDER — ALBUTEROL SULFATE 90 UG/1
2 INHALANT RESPIRATORY (INHALATION) EVERY 6 HOURS PRN
Qty: 18 G | Refills: 1 | Status: SHIPPED | OUTPATIENT
Start: 2025-05-01

## 2025-05-01 ASSESSMENT — ENCOUNTER SYMPTOMS
SHORTNESS OF BREATH: 0
WHEEZING: 0
COUGH: 0
EYE PAIN: 0
SORE THROAT: 0
CHEST TIGHTNESS: 0
RHINORRHEA: 0
ABDOMINAL PAIN: 0

## 2025-05-01 NOTE — PATIENT INSTRUCTIONS
Call with worsening symptoms such as increased shortness of breath, productive cough, wheezing or symptoms not responding to treatment plan.     She is to continue positional therapy for her mild sleep apnea.     Advised to avoid driving when too sleepy to function safely. Discussed the risks of untreated apnea such as accidents, cognitive impairment, mood impairment, high blood pressure, various cardiac diseases and stroke.

## 2025-05-01 NOTE — PROGRESS NOTES
MA Communication:  The following orders are received by verbal communication from Donita Licona CNP    Orders include:    1 YR ASTHMA      
apnea such as accidents, cognitive impairment, mood impairment, high blood pressure, various cardiac diseases and stroke.         Smoking cessation counseling provided. Individualized cessation plan includes continued smoking cessation.        RENALDO BATEMAN, APRN - CNP

## 2025-08-20 ENCOUNTER — TELEPHONE (OUTPATIENT)
Dept: INTERNAL MEDICINE CLINIC | Age: 80
End: 2025-08-20

## (undated) DEVICE — ENDOSCOPIC KIT 2 12 FT OP4 DE2 GWN SYR

## (undated) DEVICE — ELECTRODE,ECG,STRESS,FOAM,3PK: Brand: MEDLINE

## (undated) DEVICE — FORMALIN  10%NBF 20ML PREFLL CONT

## (undated) DEVICE — FORCEPS BX L240CM JAW DIA2.8MM L CAP W/ NDL MIC MESH TOOTH

## (undated) DEVICE — ELECTRODE ECG MONITR FOAM TEAR DROP ADLT RED

## (undated) DEVICE — TRAP SPEC POLYPR SGL CHMBR FN MESH SCRN

## (undated) DEVICE — SNARE ENDOSCP L240CM SHTH DIA24MM LOOP W10MM POLYP RND REINF

## (undated) DEVICE — CANNULA NSL AD TBNG L7FT PVC STR NONFLARED PRNG O2 DEL W STD

## (undated) DEVICE — CONMED SCOPE SAVER BITE BLOCK, 20X27 MM: Brand: SCOPE SAVER

## (undated) DEVICE — CANNULA NSL L4M O2 AD FILTERLINE